# Patient Record
Sex: FEMALE | Race: WHITE | Employment: OTHER | URBAN - NONMETROPOLITAN AREA
[De-identification: names, ages, dates, MRNs, and addresses within clinical notes are randomized per-mention and may not be internally consistent; named-entity substitution may affect disease eponyms.]

---

## 2017-01-26 ENCOUNTER — OFFICE VISIT (OUTPATIENT)
Dept: INTERNAL MEDICINE CLINIC | Age: 82
End: 2017-01-26

## 2017-01-26 VITALS
SYSTOLIC BLOOD PRESSURE: 130 MMHG | DIASTOLIC BLOOD PRESSURE: 62 MMHG | BODY MASS INDEX: 35.4 KG/M2 | WEIGHT: 226 LBS | RESPIRATION RATE: 20 BRPM | HEART RATE: 72 BPM | OXYGEN SATURATION: 98 % | TEMPERATURE: 97.3 F

## 2017-01-26 DIAGNOSIS — M25.562 CHRONIC PAIN OF LEFT KNEE: ICD-10-CM

## 2017-01-26 DIAGNOSIS — E66.09 NON MORBID OBESITY DUE TO EXCESS CALORIES: ICD-10-CM

## 2017-01-26 DIAGNOSIS — E78.2 MIXED HYPERLIPIDEMIA: ICD-10-CM

## 2017-01-26 DIAGNOSIS — H40.9 GLAUCOMA OF BOTH EYES, UNSPECIFIED GLAUCOMA: ICD-10-CM

## 2017-01-26 DIAGNOSIS — G89.29 CHRONIC PAIN OF LEFT KNEE: ICD-10-CM

## 2017-01-26 DIAGNOSIS — I10 ESSENTIAL HYPERTENSION: ICD-10-CM

## 2017-01-26 PROCEDURE — 99213 OFFICE O/P EST LOW 20 MIN: CPT | Performed by: INTERNAL MEDICINE

## 2017-01-26 ASSESSMENT — ENCOUNTER SYMPTOMS
RESPIRATORY NEGATIVE: 1
EYES NEGATIVE: 1
GASTROINTESTINAL NEGATIVE: 1

## 2017-02-22 ENCOUNTER — HOSPITAL ENCOUNTER (OUTPATIENT)
Dept: LAB | Age: 82
Discharge: HOME | End: 2017-02-22
Attending: INTERNAL MEDICINE | Admitting: INTERNAL MEDICINE

## 2017-02-22 LAB — PLATELET # BLD: 147 K/CU MM (ref 140–440)

## 2017-03-07 ENCOUNTER — CARE COORDINATION (OUTPATIENT)
Dept: CARE COORDINATION | Age: 82
End: 2017-03-07

## 2017-04-13 ENCOUNTER — HOSPITAL ENCOUNTER (OUTPATIENT)
Dept: PHYSICAL THERAPY | Age: 82
Discharge: HOME | End: 2017-04-30
Attending: ORTHOPAEDIC SURGERY | Admitting: ORTHOPAEDIC SURGERY

## 2017-04-22 ENCOUNTER — HOSPITAL ENCOUNTER (OUTPATIENT)
Dept: LAB | Age: 82
Discharge: HOME | End: 2017-04-22
Attending: INTERNAL MEDICINE | Admitting: INTERNAL MEDICINE

## 2017-04-22 LAB
ALBUMIN SERPL-MCNC: 3.7 GM/DL (ref 3.4–5)
ALP BLD-CCNC: 104 IU/L (ref 40–129)
ALT SERPL-CCNC: 10 U/L (ref 10–40)
ANION GAP SERPL CALCULATED.3IONS-SCNC: 12 MMOL/L (ref 4–16)
AST SERPL-CCNC: 16 IU/L (ref 15–37)
BILIRUB SERPL-MCNC: 0.4 MG/DL (ref 0–1)
BUN BLDV-MCNC: 17 MG/DL (ref 6–23)
CALCIUM SERPL-MCNC: 9.5 MG/DL (ref 8.3–10.6)
CHLORIDE BLD-SCNC: 102 MMOL/L (ref 99–110)
CO2: 28 MMOL/L (ref 21–32)
CREAT SERPL-MCNC: 1.1 MG/DL (ref 0.6–1.1)
GFR AFRICAN AMERICAN: 58 ML/MIN/1.73M2
GFR NON-AFRICAN AMERICAN: 48 ML/MIN/1.73M2
GLUCOSE BLD-MCNC: 112 MG/DL (ref 70–140)
POTASSIUM SERPL-SCNC: 3.9 MMOL/L (ref 3.5–5.1)
SODIUM BLD-SCNC: 142 MMOL/L (ref 135–145)
TOTAL PROTEIN: 7 GM/DL (ref 6.4–8.2)

## 2017-04-23 LAB
CHOLESTEROL: 198 MG/DL
HDLC SERPL-MCNC: 98 MG/DL
LDL CHOLESTEROL DIRECT: 99 MG/DL
TRIGL SERPL-MCNC: 93 MG/DL

## 2017-04-27 ENCOUNTER — OFFICE VISIT (OUTPATIENT)
Dept: INTERNAL MEDICINE CLINIC | Age: 82
End: 2017-04-27

## 2017-04-27 VITALS
OXYGEN SATURATION: 96 % | SYSTOLIC BLOOD PRESSURE: 118 MMHG | WEIGHT: 213 LBS | TEMPERATURE: 98.1 F | DIASTOLIC BLOOD PRESSURE: 68 MMHG | BODY MASS INDEX: 35.49 KG/M2 | HEART RATE: 80 BPM | HEIGHT: 65 IN | RESPIRATION RATE: 12 BRPM

## 2017-04-27 DIAGNOSIS — R63.4 WEIGHT LOSS: ICD-10-CM

## 2017-04-27 DIAGNOSIS — Z96.652 STATUS POST TOTAL LEFT KNEE REPLACEMENT: ICD-10-CM

## 2017-04-27 DIAGNOSIS — E66.09 NON MORBID OBESITY DUE TO EXCESS CALORIES: ICD-10-CM

## 2017-04-27 DIAGNOSIS — E78.2 MIXED HYPERLIPIDEMIA: ICD-10-CM

## 2017-04-27 DIAGNOSIS — I10 ESSENTIAL HYPERTENSION: Primary | ICD-10-CM

## 2017-04-27 DIAGNOSIS — H40.9 GLAUCOMA OF BOTH EYES, UNSPECIFIED GLAUCOMA: ICD-10-CM

## 2017-04-27 PROCEDURE — 99213 OFFICE O/P EST LOW 20 MIN: CPT | Performed by: INTERNAL MEDICINE

## 2017-04-27 PROCEDURE — G8510 SCR DEP NEG, NO PLAN REQD: HCPCS | Performed by: INTERNAL MEDICINE

## 2017-04-27 RX ORDER — AMLODIPINE BESYLATE 10 MG/1
10 TABLET ORAL DAILY
Qty: 90 TABLET | Refills: 1 | Status: SHIPPED | OUTPATIENT
Start: 2017-04-27 | End: 2017-11-21 | Stop reason: SDUPTHER

## 2017-04-27 RX ORDER — ATORVASTATIN CALCIUM 10 MG/1
10 TABLET, FILM COATED ORAL DAILY
Qty: 90 TABLET | Refills: 1 | Status: SHIPPED | OUTPATIENT
Start: 2017-04-27 | End: 2017-11-21 | Stop reason: SDUPTHER

## 2017-04-27 RX ORDER — ENALAPRIL MALEATE 20 MG/1
20 TABLET ORAL 2 TIMES DAILY
Qty: 180 TABLET | Refills: 1 | Status: SHIPPED | OUTPATIENT
Start: 2017-04-27 | End: 2017-11-21 | Stop reason: SDUPTHER

## 2017-04-27 ASSESSMENT — ENCOUNTER SYMPTOMS
HEARTBURN: 0
VOMITING: 0
GASTROINTESTINAL NEGATIVE: 1
EYES NEGATIVE: 1
NAUSEA: 0
RESPIRATORY NEGATIVE: 1

## 2017-04-27 ASSESSMENT — PATIENT HEALTH QUESTIONNAIRE - PHQ9
SUM OF ALL RESPONSES TO PHQ QUESTIONS 1-9: 0
1. LITTLE INTEREST OR PLEASURE IN DOING THINGS: 0
SUM OF ALL RESPONSES TO PHQ9 QUESTIONS 1 & 2: 0
2. FEELING DOWN, DEPRESSED OR HOPELESS: 0

## 2017-05-01 ENCOUNTER — HOSPITAL ENCOUNTER (OUTPATIENT)
Dept: PHYSICAL THERAPY | Age: 82
Discharge: HOME | End: 2017-05-31
Attending: ORTHOPAEDIC SURGERY | Admitting: ORTHOPAEDIC SURGERY

## 2017-06-01 ENCOUNTER — HOSPITAL ENCOUNTER (OUTPATIENT)
Dept: PHYSICAL THERAPY | Age: 82
Discharge: HOME | End: 2017-06-12
Attending: ORTHOPAEDIC SURGERY | Admitting: ORTHOPAEDIC SURGERY

## 2017-08-16 ENCOUNTER — HOSPITAL ENCOUNTER (OUTPATIENT)
Dept: GENERAL RADIOLOGY | Age: 82
Discharge: HOME | End: 2017-08-16
Attending: INTERNAL MEDICINE | Admitting: INTERNAL MEDICINE

## 2017-08-16 ENCOUNTER — OFFICE VISIT (OUTPATIENT)
Dept: INTERNAL MEDICINE CLINIC | Age: 82
End: 2017-08-16

## 2017-08-16 ENCOUNTER — HOSPITAL ENCOUNTER (OUTPATIENT)
Dept: ULTRASOUND IMAGING | Age: 82
Discharge: HOME | End: 2017-08-16
Attending: INTERNAL MEDICINE

## 2017-08-16 VITALS
DIASTOLIC BLOOD PRESSURE: 72 MMHG | OXYGEN SATURATION: 97 % | TEMPERATURE: 98.2 F | HEART RATE: 76 BPM | SYSTOLIC BLOOD PRESSURE: 136 MMHG | RESPIRATION RATE: 13 BRPM

## 2017-08-16 DIAGNOSIS — H40.9 GLAUCOMA OF BOTH EYES, UNSPECIFIED GLAUCOMA: ICD-10-CM

## 2017-08-16 DIAGNOSIS — E66.09 NON MORBID OBESITY DUE TO EXCESS CALORIES: ICD-10-CM

## 2017-08-16 DIAGNOSIS — Z96.652 STATUS POST TOTAL LEFT KNEE REPLACEMENT: ICD-10-CM

## 2017-08-16 DIAGNOSIS — R10.32 LT GROIN PAIN: ICD-10-CM

## 2017-08-16 DIAGNOSIS — M79.652 LEFT THIGH PAIN: ICD-10-CM

## 2017-08-16 DIAGNOSIS — I10 ESSENTIAL HYPERTENSION: ICD-10-CM

## 2017-08-16 DIAGNOSIS — E78.2 MIXED HYPERLIPIDEMIA: ICD-10-CM

## 2017-08-16 DIAGNOSIS — M79.652 LEFT THIGH PAIN: Primary | ICD-10-CM

## 2017-08-16 DIAGNOSIS — R10.32 LEFT GROIN PAIN: ICD-10-CM

## 2017-08-16 PROCEDURE — 99214 OFFICE O/P EST MOD 30 MIN: CPT | Performed by: INTERNAL MEDICINE

## 2017-08-16 RX ORDER — TRAMADOL HYDROCHLORIDE 50 MG/1
50 TABLET ORAL EVERY 6 HOURS PRN
Qty: 20 TABLET | Refills: 0 | Status: SHIPPED | OUTPATIENT
Start: 2017-08-16 | End: 2017-09-26 | Stop reason: ALTCHOICE

## 2017-09-26 ENCOUNTER — OFFICE VISIT (OUTPATIENT)
Dept: INTERNAL MEDICINE CLINIC | Age: 82
End: 2017-09-26

## 2017-09-26 ENCOUNTER — TELEPHONE (OUTPATIENT)
Dept: INTERNAL MEDICINE CLINIC | Age: 82
End: 2017-09-26

## 2017-09-26 VITALS
TEMPERATURE: 97.6 F | RESPIRATION RATE: 16 BRPM | OXYGEN SATURATION: 98 % | HEART RATE: 76 BPM | WEIGHT: 200.8 LBS | SYSTOLIC BLOOD PRESSURE: 126 MMHG | BODY MASS INDEX: 33.41 KG/M2 | DIASTOLIC BLOOD PRESSURE: 76 MMHG

## 2017-09-26 DIAGNOSIS — H40.9 GLAUCOMA OF BOTH EYES, UNSPECIFIED GLAUCOMA: ICD-10-CM

## 2017-09-26 DIAGNOSIS — E78.2 MIXED HYPERLIPIDEMIA: ICD-10-CM

## 2017-09-26 DIAGNOSIS — Z96.652 STATUS POST TOTAL LEFT KNEE REPLACEMENT: ICD-10-CM

## 2017-09-26 DIAGNOSIS — Z23 NEED FOR IMMUNIZATION AGAINST INFLUENZA: ICD-10-CM

## 2017-09-26 DIAGNOSIS — Z12.31 VISIT FOR SCREENING MAMMOGRAM: Primary | ICD-10-CM

## 2017-09-26 DIAGNOSIS — I10 ESSENTIAL HYPERTENSION: ICD-10-CM

## 2017-09-26 PROCEDURE — G0008 ADMIN INFLUENZA VIRUS VAC: HCPCS | Performed by: INTERNAL MEDICINE

## 2017-09-26 PROCEDURE — 99213 OFFICE O/P EST LOW 20 MIN: CPT | Performed by: INTERNAL MEDICINE

## 2017-09-26 PROCEDURE — 90662 IIV NO PRSV INCREASED AG IM: CPT | Performed by: INTERNAL MEDICINE

## 2017-09-26 ASSESSMENT — ENCOUNTER SYMPTOMS
RESPIRATORY NEGATIVE: 1
GASTROINTESTINAL NEGATIVE: 1
EYES NEGATIVE: 1
COUGH: 0
SHORTNESS OF BREATH: 0

## 2017-10-04 ENCOUNTER — HOSPITAL ENCOUNTER (OUTPATIENT)
Dept: MAMMOGRAPHY | Age: 82
Discharge: HOME | End: 2017-10-04
Attending: INTERNAL MEDICINE | Admitting: INTERNAL MEDICINE

## 2017-10-04 DIAGNOSIS — Z12.31 VISIT FOR SCREENING MAMMOGRAM: ICD-10-CM

## 2017-11-21 RX ORDER — ENALAPRIL MALEATE 20 MG/1
20 TABLET ORAL 2 TIMES DAILY
Qty: 180 TABLET | Refills: 1 | Status: SHIPPED | OUTPATIENT
Start: 2017-11-21 | End: 2018-05-31 | Stop reason: SDUPTHER

## 2017-11-21 RX ORDER — AMLODIPINE BESYLATE 10 MG/1
10 TABLET ORAL DAILY
Qty: 90 TABLET | Refills: 1 | Status: SHIPPED | OUTPATIENT
Start: 2017-11-21 | End: 2018-05-31 | Stop reason: SDUPTHER

## 2017-11-21 RX ORDER — HYDROCHLOROTHIAZIDE 25 MG/1
25 TABLET ORAL DAILY
Qty: 90 TABLET | Refills: 1 | Status: SHIPPED | OUTPATIENT
Start: 2017-11-21 | End: 2018-11-28 | Stop reason: SDUPTHER

## 2017-11-21 RX ORDER — ATORVASTATIN CALCIUM 10 MG/1
10 TABLET, FILM COATED ORAL DAILY
Qty: 90 TABLET | Refills: 1 | Status: SHIPPED | OUTPATIENT
Start: 2017-11-21 | End: 2018-05-31 | Stop reason: SDUPTHER

## 2017-12-21 ENCOUNTER — HOSPITAL ENCOUNTER (OUTPATIENT)
Dept: LAB | Age: 82
Discharge: HOME | End: 2017-12-21
Attending: INTERNAL MEDICINE | Admitting: INTERNAL MEDICINE

## 2017-12-21 LAB
ALBUMIN SERPL-MCNC: 3.7 GM/DL (ref 3.4–5)
ALP BLD-CCNC: 87 IU/L (ref 40–129)
ALT SERPL-CCNC: 11 U/L (ref 10–40)
ANION GAP SERPL CALCULATED.3IONS-SCNC: 12 MMOL/L (ref 4–16)
AST SERPL-CCNC: 20 IU/L (ref 15–37)
BILIRUB SERPL-MCNC: 0.5 MG/DL (ref 0–1)
BUN BLDV-MCNC: 22 MG/DL (ref 6–23)
CALCIUM SERPL-MCNC: 9.7 MG/DL (ref 8.3–10.6)
CHLORIDE BLD-SCNC: 105 MMOL/L (ref 99–110)
CHOLESTEROL, FASTING: 204 MG/DL
CO2: 28 MMOL/L (ref 21–32)
CREAT SERPL-MCNC: 1.2 MG/DL (ref 0.6–1.1)
GFR AFRICAN AMERICAN: 52 ML/MIN/1.73M2
GFR NON-AFRICAN AMERICAN: 43 ML/MIN/1.73M2
GLUCOSE FASTING: 105 MG/DL (ref 70–99)
HDLC SERPL-MCNC: 101 MG/DL
LDL CHOLESTEROL DIRECT: 96 MG/DL
POTASSIUM SERPL-SCNC: 3.9 MMOL/L (ref 3.5–5.1)
SODIUM BLD-SCNC: 145 MMOL/L (ref 135–145)
TOTAL PROTEIN: 6.7 GM/DL (ref 6.4–8.2)
TRIGLYCERIDE, FASTING: 68 MG/DL

## 2018-01-09 ENCOUNTER — OFFICE VISIT (OUTPATIENT)
Dept: INTERNAL MEDICINE CLINIC | Age: 83
End: 2018-01-09

## 2018-01-09 VITALS
HEIGHT: 66 IN | SYSTOLIC BLOOD PRESSURE: 130 MMHG | OXYGEN SATURATION: 97 % | RESPIRATION RATE: 16 BRPM | DIASTOLIC BLOOD PRESSURE: 70 MMHG | WEIGHT: 201.6 LBS | BODY MASS INDEX: 32.4 KG/M2 | TEMPERATURE: 97.2 F | HEART RATE: 72 BPM

## 2018-01-09 DIAGNOSIS — H40.9 GLAUCOMA OF BOTH EYES, UNSPECIFIED GLAUCOMA TYPE: ICD-10-CM

## 2018-01-09 DIAGNOSIS — E78.2 MIXED HYPERLIPIDEMIA: ICD-10-CM

## 2018-01-09 DIAGNOSIS — Z96.652 STATUS POST TOTAL LEFT KNEE REPLACEMENT: ICD-10-CM

## 2018-01-09 DIAGNOSIS — I10 ESSENTIAL HYPERTENSION: ICD-10-CM

## 2018-01-09 PROCEDURE — 99213 OFFICE O/P EST LOW 20 MIN: CPT | Performed by: INTERNAL MEDICINE

## 2018-01-09 ASSESSMENT — ENCOUNTER SYMPTOMS
EYES NEGATIVE: 1
GASTROINTESTINAL NEGATIVE: 1
RESPIRATORY NEGATIVE: 1

## 2018-04-02 ENCOUNTER — HOSPITAL ENCOUNTER (OUTPATIENT)
Dept: LAB | Age: 83
Discharge: HOME | End: 2018-04-02
Attending: INTERNAL MEDICINE | Admitting: INTERNAL MEDICINE

## 2018-04-02 LAB
ANION GAP SERPL CALCULATED.3IONS-SCNC: 5 MMOL/L (ref 4–16)
BUN BLDV-MCNC: 20 MG/DL (ref 6–23)
CALCIUM SERPL-MCNC: 9.3 MG/DL (ref 8.3–10.6)
CHLORIDE BLD-SCNC: 108 MMOL/L (ref 99–110)
CO2: 33 MMOL/L (ref 21–32)
CREAT SERPL-MCNC: 1 MG/DL (ref 0.6–1.1)
GFR AFRICAN AMERICAN: >60 ML/MIN/1.73M2
GFR NON-AFRICAN AMERICAN: 53 ML/MIN/1.73M2
GLUCOSE FASTING: 101 MG/DL (ref 70–99)
POTASSIUM SERPL-SCNC: 4.5 MMOL/L (ref 3.5–5.1)
SODIUM BLD-SCNC: 146 MMOL/L (ref 135–145)

## 2018-04-10 ENCOUNTER — OFFICE VISIT (OUTPATIENT)
Dept: INTERNAL MEDICINE CLINIC | Age: 83
End: 2018-04-10

## 2018-04-10 VITALS
WEIGHT: 202.2 LBS | HEART RATE: 78 BPM | BODY MASS INDEX: 32.64 KG/M2 | RESPIRATION RATE: 16 BRPM | SYSTOLIC BLOOD PRESSURE: 132 MMHG | DIASTOLIC BLOOD PRESSURE: 80 MMHG | OXYGEN SATURATION: 99 % | TEMPERATURE: 97.5 F

## 2018-04-10 DIAGNOSIS — I10 ESSENTIAL HYPERTENSION: Primary | ICD-10-CM

## 2018-04-10 DIAGNOSIS — R53.83 FATIGUE, UNSPECIFIED TYPE: ICD-10-CM

## 2018-04-10 DIAGNOSIS — H40.9 GLAUCOMA OF BOTH EYES, UNSPECIFIED GLAUCOMA TYPE: ICD-10-CM

## 2018-04-10 DIAGNOSIS — E78.2 MIXED HYPERLIPIDEMIA: ICD-10-CM

## 2018-04-10 DIAGNOSIS — Z96.652 STATUS POST TOTAL LEFT KNEE REPLACEMENT: ICD-10-CM

## 2018-04-10 PROCEDURE — 1123F ACP DISCUSS/DSCN MKR DOCD: CPT | Performed by: INTERNAL MEDICINE

## 2018-04-10 PROCEDURE — 4040F PNEUMOC VAC/ADMIN/RCVD: CPT | Performed by: INTERNAL MEDICINE

## 2018-04-10 PROCEDURE — G8427 DOCREV CUR MEDS BY ELIG CLIN: HCPCS | Performed by: INTERNAL MEDICINE

## 2018-04-10 PROCEDURE — 99213 OFFICE O/P EST LOW 20 MIN: CPT | Performed by: INTERNAL MEDICINE

## 2018-04-10 PROCEDURE — 1036F TOBACCO NON-USER: CPT | Performed by: INTERNAL MEDICINE

## 2018-04-10 PROCEDURE — 1090F PRES/ABSN URINE INCON ASSESS: CPT | Performed by: INTERNAL MEDICINE

## 2018-04-10 PROCEDURE — G8417 CALC BMI ABV UP PARAM F/U: HCPCS | Performed by: INTERNAL MEDICINE

## 2018-04-10 PROCEDURE — G8399 PT W/DXA RESULTS DOCUMENT: HCPCS | Performed by: INTERNAL MEDICINE

## 2018-04-10 ASSESSMENT — ENCOUNTER SYMPTOMS
SHORTNESS OF BREATH: 0
BACK PAIN: 1
EYES NEGATIVE: 1
RESPIRATORY NEGATIVE: 1
COUGH: 0
GASTROINTESTINAL NEGATIVE: 1

## 2018-05-31 RX ORDER — ATORVASTATIN CALCIUM 10 MG/1
10 TABLET, FILM COATED ORAL DAILY
Qty: 90 TABLET | Refills: 1 | Status: SHIPPED | OUTPATIENT
Start: 2018-05-31 | End: 2018-11-28 | Stop reason: SDUPTHER

## 2018-05-31 RX ORDER — ENALAPRIL MALEATE 20 MG/1
20 TABLET ORAL 2 TIMES DAILY
Qty: 180 TABLET | Refills: 1 | Status: SHIPPED | OUTPATIENT
Start: 2018-05-31 | End: 2018-11-28 | Stop reason: SDUPTHER

## 2018-05-31 RX ORDER — AMLODIPINE BESYLATE 10 MG/1
10 TABLET ORAL DAILY
Qty: 90 TABLET | Refills: 1 | Status: SHIPPED | OUTPATIENT
Start: 2018-05-31 | End: 2018-11-28 | Stop reason: SDUPTHER

## 2018-07-02 ENCOUNTER — HOSPITAL ENCOUNTER (OUTPATIENT)
Dept: LAB | Age: 83
Discharge: HOME | End: 2018-07-02
Attending: INTERNAL MEDICINE | Admitting: INTERNAL MEDICINE

## 2018-07-02 LAB
ALBUMIN SERPL-MCNC: 3.7 GM/DL (ref 3.4–5)
ALP BLD-CCNC: 82 IU/L (ref 40–129)
ALT SERPL-CCNC: 11 U/L (ref 10–40)
ANION GAP SERPL CALCULATED.3IONS-SCNC: 15 MMOL/L (ref 4–16)
AST SERPL-CCNC: 19 IU/L (ref 15–37)
BASOPHILS ABSOLUTE: 0 K/CU MM
BASOPHILS RELATIVE PERCENT: 0.5 % (ref 0–1)
BILIRUB SERPL-MCNC: 0.5 MG/DL (ref 0–1)
BUN BLDV-MCNC: 27 MG/DL (ref 6–23)
CALCIUM SERPL-MCNC: 9.5 MG/DL (ref 8.3–10.6)
CHLORIDE BLD-SCNC: 104 MMOL/L (ref 99–110)
CHOLESTEROL, FASTING: 179 MG/DL
CO2: 25 MMOL/L (ref 21–32)
CREAT SERPL-MCNC: 1.3 MG/DL (ref 0.6–1.1)
DIFFERENTIAL TYPE: ABNORMAL
EOSINOPHILS ABSOLUTE: 0.2 K/CU MM
EOSINOPHILS RELATIVE PERCENT: 3 % (ref 0–3)
GFR AFRICAN AMERICAN: 48 ML/MIN/1.73M2
GFR NON-AFRICAN AMERICAN: 39 ML/MIN/1.73M2
GLUCOSE FASTING: 94 MG/DL (ref 70–99)
HCT VFR BLD CALC: 37.8 % (ref 37–47)
HDLC SERPL-MCNC: 90 MG/DL
HEMOGLOBIN: 11.9 GM/DL (ref 12.5–16)
IMMATURE NEUTROPHIL %: 0.3 % (ref 0–0.43)
LDL CHOLESTEROL DIRECT: 92 MG/DL
LYMPHOCYTES ABSOLUTE: 2.7 K/CU MM
LYMPHOCYTES RELATIVE PERCENT: 36 % (ref 24–44)
MCH RBC QN AUTO: 32.3 PG (ref 27–31)
MCHC RBC AUTO-ENTMCNC: 31.5 % (ref 32–36)
MCV RBC AUTO: 102.7 FL (ref 78–100)
MONOCYTES ABSOLUTE: 0.6 K/CU MM
MONOCYTES RELATIVE PERCENT: 8.3 % (ref 0–4)
PDW BLD-RTO: 13.2 % (ref 11.7–14.9)
PLATELET # BLD: 62 K/CU MM (ref 140–440)
PMV BLD AUTO: 12.5 FL (ref 7.5–11.1)
POTASSIUM SERPL-SCNC: 4.6 MMOL/L (ref 3.5–5.1)
RBC # BLD: 3.68 M/CU MM (ref 4.2–5.4)
SEGMENTED NEUTROPHILS ABSOLUTE COUNT: 4 K/CU MM
SEGMENTED NEUTROPHILS RELATIVE PERCENT: 51.9 % (ref 36–66)
SODIUM BLD-SCNC: 144 MMOL/L (ref 135–145)
TOTAL IMMATURE NEUTOROPHIL: 0.02 K/CU MM
TOTAL PROTEIN: 6.5 GM/DL (ref 6.4–8.2)
TRIGLYCERIDE, FASTING: 66 MG/DL
TSH HIGH SENSITIVITY: 2.05 UIU/ML (ref 0.27–4.2)
WBC # BLD: 7.6 K/CU MM (ref 4–10.5)

## 2018-07-11 ENCOUNTER — OFFICE VISIT (OUTPATIENT)
Dept: INTERNAL MEDICINE CLINIC | Age: 83
End: 2018-07-11

## 2018-07-11 VITALS
RESPIRATION RATE: 18 BRPM | DIASTOLIC BLOOD PRESSURE: 72 MMHG | WEIGHT: 194.2 LBS | SYSTOLIC BLOOD PRESSURE: 122 MMHG | BODY MASS INDEX: 31.21 KG/M2 | TEMPERATURE: 97.4 F | HEIGHT: 66 IN | HEART RATE: 75 BPM | OXYGEN SATURATION: 98 %

## 2018-07-11 DIAGNOSIS — R53.83 OTHER FATIGUE: ICD-10-CM

## 2018-07-11 DIAGNOSIS — I10 ESSENTIAL HYPERTENSION: Primary | ICD-10-CM

## 2018-07-11 DIAGNOSIS — E78.2 MIXED HYPERLIPIDEMIA: ICD-10-CM

## 2018-07-11 DIAGNOSIS — H40.9 GLAUCOMA OF BOTH EYES, UNSPECIFIED GLAUCOMA TYPE: ICD-10-CM

## 2018-07-11 DIAGNOSIS — F32.A DEPRESSION, UNSPECIFIED DEPRESSION TYPE: ICD-10-CM

## 2018-07-11 DIAGNOSIS — Z96.652 STATUS POST TOTAL LEFT KNEE REPLACEMENT: ICD-10-CM

## 2018-07-11 PROCEDURE — 4040F PNEUMOC VAC/ADMIN/RCVD: CPT | Performed by: INTERNAL MEDICINE

## 2018-07-11 PROCEDURE — 1036F TOBACCO NON-USER: CPT | Performed by: INTERNAL MEDICINE

## 2018-07-11 PROCEDURE — G8427 DOCREV CUR MEDS BY ELIG CLIN: HCPCS | Performed by: INTERNAL MEDICINE

## 2018-07-11 PROCEDURE — 1101F PT FALLS ASSESS-DOCD LE1/YR: CPT | Performed by: INTERNAL MEDICINE

## 2018-07-11 PROCEDURE — G8399 PT W/DXA RESULTS DOCUMENT: HCPCS | Performed by: INTERNAL MEDICINE

## 2018-07-11 PROCEDURE — 1090F PRES/ABSN URINE INCON ASSESS: CPT | Performed by: INTERNAL MEDICINE

## 2018-07-11 PROCEDURE — 99214 OFFICE O/P EST MOD 30 MIN: CPT | Performed by: INTERNAL MEDICINE

## 2018-07-11 PROCEDURE — G8417 CALC BMI ABV UP PARAM F/U: HCPCS | Performed by: INTERNAL MEDICINE

## 2018-07-11 PROCEDURE — 1123F ACP DISCUSS/DSCN MKR DOCD: CPT | Performed by: INTERNAL MEDICINE

## 2018-07-11 RX ORDER — ESCITALOPRAM OXALATE 10 MG/1
10 TABLET ORAL DAILY
Qty: 30 TABLET | Refills: 3 | Status: SHIPPED | OUTPATIENT
Start: 2018-07-11 | End: 2018-11-28 | Stop reason: SDUPTHER

## 2018-07-11 ASSESSMENT — ENCOUNTER SYMPTOMS
BACK PAIN: 0
SHORTNESS OF BREATH: 0
CONSTIPATION: 1
BLOOD IN STOOL: 0
EYES NEGATIVE: 1
NAUSEA: 0
RESPIRATORY NEGATIVE: 1
VOMITING: 0

## 2018-07-11 ASSESSMENT — PATIENT HEALTH QUESTIONNAIRE - PHQ9
SUM OF ALL RESPONSES TO PHQ QUESTIONS 1-9: 1
1. LITTLE INTEREST OR PLEASURE IN DOING THINGS: 1
2. FEELING DOWN, DEPRESSED OR HOPELESS: 0
SUM OF ALL RESPONSES TO PHQ9 QUESTIONS 1 & 2: 1

## 2018-07-11 NOTE — PROGRESS NOTES
Kymberly Gayle  Patient's  is 1935  Seen in office on 2018      SUBJECTIVE:  Vicente Martinez is a [de-identified] y. o.year old female presents today   Chief Complaint   Patient presents with    3 Month Follow-Up     HTN    Knee Problem     Pt had a knee replacement about 1.5 years ago and states she still has problems with it. Pt has an appt with that doctor tomorrow.  Other     Pt states she is having the same experiences as her last visit of having very little energy and gets frustrated when she can't do what she used to be able to do     Pt is here for f/u of HTN, left knee pain, lack of energy. Patient states she is not able to do the things she was able to do in the past  No chest pain. No SOB>  Edema some times  Lack of energy. No falls. Appetite is fair. Losing wt again. Lost 8 lbs. No melena or hematochezia  Pt has constipation : moves BM every 3-4 days. Takes laxatives after that. Taking medications regularly. No side effects noted. Lab results : reviewed TSH is normal.  Lipid profile normal.  CMP : cr 1.3  H/H 11.9/37.8 : .7    Pt's son in law  in  with heart attack. It is bothering her. It affected her also. Has some depression. No suicidal ideation. Review of Systems   Constitutional: Negative. Negative for chills, fever and weight loss. HENT: Negative. Negative for hearing loss. Eyes: Negative. Respiratory: Negative. Negative for shortness of breath. Cardiovascular: Negative. Negative for chest pain, palpitations and leg swelling. Gastrointestinal: Positive for constipation. Negative for blood in stool, nausea and vomiting. Genitourinary: Negative. Negative for dysuria and urgency. Musculoskeletal: Negative for back pain and falls. Skin: Negative. Neurological: Negative for dizziness and headaches. Endo/Heme/Allergies: Negative for environmental allergies. Does not bruise/bleed easily.    Psychiatric/Behavioral: Negative for depression and

## 2018-07-11 NOTE — ASSESSMENT & PLAN NOTE
Hypertension in control. Follow low salt diet. Continue current treatment.   Pt is on enalapril 20 mg bid, amlodipine 10 mg daily and HCTZ

## 2018-07-11 NOTE — ASSESSMENT & PLAN NOTE
Hyperlipidemia is stable. Follow low cholesterol diet. Continue current treatment. Continue atorvastatin 10 mg daily  Lipid profile is good.

## 2018-07-24 ENCOUNTER — HOSPITAL ENCOUNTER (OUTPATIENT)
Dept: MRI IMAGING | Age: 83
Discharge: HOME | End: 2018-07-24
Attending: ORTHOPAEDIC SURGERY | Admitting: ORTHOPAEDIC SURGERY

## 2018-07-24 DIAGNOSIS — M25.551 RIGHT HIP PAIN: ICD-10-CM

## 2018-09-13 ENCOUNTER — OFFICE VISIT (OUTPATIENT)
Dept: INTERNAL MEDICINE CLINIC | Age: 83
End: 2018-09-13

## 2018-09-13 ENCOUNTER — HOSPITAL ENCOUNTER (OUTPATIENT)
Dept: LAB | Age: 83
Discharge: HOME | End: 2018-09-13
Attending: INTERNAL MEDICINE | Admitting: INTERNAL MEDICINE

## 2018-09-13 VITALS
SYSTOLIC BLOOD PRESSURE: 128 MMHG | OXYGEN SATURATION: 93 % | DIASTOLIC BLOOD PRESSURE: 80 MMHG | BODY MASS INDEX: 30.18 KG/M2 | HEIGHT: 66 IN | WEIGHT: 187.8 LBS | RESPIRATION RATE: 16 BRPM | HEART RATE: 74 BPM

## 2018-09-13 DIAGNOSIS — I10 ESSENTIAL HYPERTENSION: ICD-10-CM

## 2018-09-13 DIAGNOSIS — R53.83 OTHER FATIGUE: ICD-10-CM

## 2018-09-13 DIAGNOSIS — D69.6 THROMBOCYTOPENIA (HCC): Primary | ICD-10-CM

## 2018-09-13 DIAGNOSIS — H40.9 GLAUCOMA OF BOTH EYES, UNSPECIFIED GLAUCOMA TYPE: ICD-10-CM

## 2018-09-13 DIAGNOSIS — E78.2 MIXED HYPERLIPIDEMIA: ICD-10-CM

## 2018-09-13 DIAGNOSIS — F32.9 MAJOR DEPRESSIVE DISORDER WITH SINGLE EPISODE, REMISSION STATUS UNSPECIFIED: ICD-10-CM

## 2018-09-13 LAB
ALBUMIN SERPL-MCNC: 3.9 GM/DL (ref 3.4–5)
ALP BLD-CCNC: 76 IU/L (ref 40–129)
ALT SERPL-CCNC: 11 U/L (ref 10–40)
ANION GAP SERPL CALCULATED.3IONS-SCNC: 14 MMOL/L (ref 4–16)
AST SERPL-CCNC: 21 IU/L (ref 15–37)
BILIRUB SERPL-MCNC: 0.4 MG/DL (ref 0–1)
BUN BLDV-MCNC: 21 MG/DL (ref 6–23)
CALCIUM SERPL-MCNC: 9.6 MG/DL (ref 8.3–10.6)
CHLORIDE BLD-SCNC: 100 MMOL/L (ref 99–110)
CO2: 28 MMOL/L (ref 21–32)
CREAT SERPL-MCNC: 1.1 MG/DL (ref 0.6–1.1)
DIFFERENTIAL TYPE: ABNORMAL
GFR AFRICAN AMERICAN: 58 ML/MIN/1.73M2
GFR NON-AFRICAN AMERICAN: 48 ML/MIN/1.73M2
GLUCOSE FASTING: 110 MG/DL (ref 70–99)
HCT VFR BLD CALC: 40.3 % (ref 37–47)
HEMOGLOBIN: 12.3 GM/DL (ref 12.5–16)
LYMPHOCYTES ABSOLUTE: 2 K/CU MM
LYMPHOCYTES RELATIVE PERCENT: 25 % (ref 24–44)
MCH RBC QN AUTO: 32 PG (ref 27–31)
MCHC RBC AUTO-ENTMCNC: 30.5 % (ref 32–36)
MCV RBC AUTO: 104.9 FL (ref 78–100)
MONOCYTES ABSOLUTE: 0.6 K/CU MM
MONOCYTES RELATIVE PERCENT: 8 % (ref 0–4)
PDW BLD-RTO: 13.2 % (ref 11.7–14.9)
PLATELET # BLD: 98 K/CU MM (ref 140–440)
PMV BLD AUTO: 12.4 FL (ref 7.5–11.1)
POTASSIUM SERPL-SCNC: 4.6 MMOL/L (ref 3.5–5.1)
RBC # BLD: 3.84 M/CU MM (ref 4.2–5.4)
SEGMENTED NEUTROPHILS ABSOLUTE COUNT: 5.2 K/CU MM
SEGMENTED NEUTROPHILS RELATIVE PERCENT: 67 % (ref 36–66)
SODIUM BLD-SCNC: 142 MMOL/L (ref 135–145)
TOTAL PROTEIN: 6.9 GM/DL (ref 6.4–8.2)
WBC # BLD: 7.8 K/CU MM (ref 4–10.5)

## 2018-09-13 PROCEDURE — G8399 PT W/DXA RESULTS DOCUMENT: HCPCS | Performed by: INTERNAL MEDICINE

## 2018-09-13 PROCEDURE — 1036F TOBACCO NON-USER: CPT | Performed by: INTERNAL MEDICINE

## 2018-09-13 PROCEDURE — G8427 DOCREV CUR MEDS BY ELIG CLIN: HCPCS | Performed by: INTERNAL MEDICINE

## 2018-09-13 PROCEDURE — G8417 CALC BMI ABV UP PARAM F/U: HCPCS | Performed by: INTERNAL MEDICINE

## 2018-09-13 PROCEDURE — 1090F PRES/ABSN URINE INCON ASSESS: CPT | Performed by: INTERNAL MEDICINE

## 2018-09-13 PROCEDURE — 1123F ACP DISCUSS/DSCN MKR DOCD: CPT | Performed by: INTERNAL MEDICINE

## 2018-09-13 PROCEDURE — 4040F PNEUMOC VAC/ADMIN/RCVD: CPT | Performed by: INTERNAL MEDICINE

## 2018-09-13 PROCEDURE — 99213 OFFICE O/P EST LOW 20 MIN: CPT | Performed by: INTERNAL MEDICINE

## 2018-09-13 PROCEDURE — 1101F PT FALLS ASSESS-DOCD LE1/YR: CPT | Performed by: INTERNAL MEDICINE

## 2018-09-13 ASSESSMENT — PATIENT HEALTH QUESTIONNAIRE - PHQ9
2. FEELING DOWN, DEPRESSED OR HOPELESS: 0
SUM OF ALL RESPONSES TO PHQ9 QUESTIONS 1 & 2: 1
1. LITTLE INTEREST OR PLEASURE IN DOING THINGS: 1
SUM OF ALL RESPONSES TO PHQ QUESTIONS 1-9: 1
SUM OF ALL RESPONSES TO PHQ QUESTIONS 1-9: 1

## 2018-09-13 ASSESSMENT — ENCOUNTER SYMPTOMS
EYES NEGATIVE: 1
BACK PAIN: 1
GASTROINTESTINAL NEGATIVE: 1
RESPIRATORY NEGATIVE: 1

## 2018-09-13 NOTE — PROGRESS NOTES
Kathleen Yates  Patient's  is 1935  Seen in office on 2018      SUBJECTIVE:  Gomez Runner is a 82 y. o.year old female presents today   Chief Complaint   Patient presents with    Follow-up     2 month follow up-Depression     Patient is here for follow-up of her depression. She is taking Lexapro and is feeling better to some extent. No suicidal ideations. Patient is concerned about her left hip pain. She went to see orthopedic doctor in [place] and they ordered an MRI of the right hip. MRI showed some mild arthritis and effusion in the right knee and they were able to see partially the left hip that also showed the same. She also had some bursitis. She is undergoing physical therapy  Patient denies any chest pain. No shortness of breath. No cough or sputum production. Patient has fatigue. Blood tests were done 2 months ago. Patient states she is bruising of the arms. No bleeding from the nose, stools or in the urine. Patient's CBC showed a reticulocyte count of 62,000. The last CBC was in     Taking medications regularly. No side effects noted. Review of Systems   Constitutional: Negative. Negative for chills and fever. HENT: Negative. Eyes: Negative. Respiratory: Negative. Cardiovascular: Negative. Gastrointestinal: Negative. Genitourinary: Negative. Negative for dysuria and urgency. Musculoskeletal: Positive for back pain and joint pain. Skin: Negative. Neurological: Negative. Endo/Heme/Allergies: Negative. Psychiatric/Behavioral: Positive for depression.        OBJECTIVE: /80 (Site: Right Upper Arm, Position: Sitting, Cuff Size: Medium Adult)   Pulse 74   Resp 16   Ht 5' 6\" (1.676 m)   Wt 187 lb 12.8 oz (85.2 kg)   SpO2 93%   BMI 30.31 kg/m²     Wt Readings from Last 3 Encounters:   18 187 lb 12.8 oz (85.2 kg)   18 194 lb 3.2 oz (88.1 kg)   04/10/18 202 lb 3.2 oz (91.7 kg)      GENERAL:  Alert, oriented, pleasant, in no glaucoma type  Patient has glaucoma and is on medications    Return to office in 1 week    Repeat CBC with a differential today shows a white count of 7800, hemoglobin 12.3 hematocrit 40.3. .9 . Platelet count had improved to 98,000        Mediations reviewed with the patient. Continue current medications. Appropriate prescriptions are addressed. After visit summery provided. Follow up as directed sooner if needed. Questions answered and patient verbalizes understanding. Call for any problems, questions, or concerns. No Known Allergies  Current Outpatient Prescriptions   Medication Sig Dispense Refill    escitalopram (LEXAPRO) 10 MG tablet Take 1 tablet by mouth daily 30 tablet 3    amLODIPine (NORVASC) 10 MG tablet Take 1 tablet by mouth daily 90 tablet 1    atorvastatin (LIPITOR) 10 MG tablet Take 1 tablet by mouth daily 90 tablet 1    enalapril (VASOTEC) 20 MG tablet Take 1 tablet by mouth 2 times daily 180 tablet 1    hydrochlorothiazide (HYDRODIURIL) 25 MG tablet Take 1 tablet by mouth daily 90 tablet 1    aspirin 81 MG tablet Take 81 mg by mouth daily.  latanoprost (XALATAN) 0.005 % ophthalmic solution 1 drop nightly. No current facility-administered medications for this visit. Past Medical History:   Diagnosis Date    Colonoscopy refused     Glaucoma     Dr. Emily Magallon Hyperlipidemia     Hypertension     Major depressive disorder with single episode 9/13/2018    Patient has depression and agitation. Was started on the Lexapro.     Obesity      Past Surgical History:   Procedure Laterality Date    CATARACT REMOVAL Bilateral     CHOLECYSTECTOMY      HYSTERECTOMY Bilateral 1981    KNEE ARTHROSCOPY Left 2007    TOTAL KNEE ARTHROPLASTY Left 03/10/2017     Social History   Substance Use Topics    Smoking status: Never Smoker    Smokeless tobacco: Never Used    Alcohol use No       LAB REVIEW:  CBC:   Lab Results   Component Value Date    WBC 7.8 09/13/2018    HGB 12.3 09/13/2018    HCT 40.3 09/13/2018    PLT 98 09/13/2018     Lipids:   Lab Results   Component Value Date    CHOL 198 04/22/2017    TRIG 93 04/22/2017    HDL 90 07/02/2018    LDLCALC 72 12/01/2014    LDLDIRECT 92 07/02/2018    TRIGLYCFAST 66 07/02/2018     Renal:   Lab Results   Component Value Date    BUN 21 09/13/2018    CREATININE 1.1 09/13/2018     09/13/2018    K 4.6 09/13/2018    ALT 11 09/13/2018    AST 21 09/13/2018    GLUCOSE 112 04/22/2017     PT/INR: No results found for: INR  A1C: No results found for: Elias Sow MD, 9/13/2018 , 2:50 PM

## 2018-09-14 ENCOUNTER — HOSPITAL ENCOUNTER (OUTPATIENT)
Dept: LAB | Age: 83
Discharge: HOME | End: 2018-09-14
Attending: INTERNAL MEDICINE | Admitting: INTERNAL MEDICINE

## 2018-09-14 LAB
FOLATE: 14.4 NG/ML (ref 3.1–17.5)
VITAMIN B-12: 387.5 PG/ML (ref 211–911)

## 2018-09-21 ENCOUNTER — HOSPITAL ENCOUNTER (OUTPATIENT)
Dept: GENERAL RADIOLOGY | Age: 83
Discharge: HOME | End: 2018-09-21
Attending: INTERNAL MEDICINE | Admitting: INTERNAL MEDICINE

## 2018-09-21 ENCOUNTER — OFFICE VISIT (OUTPATIENT)
Dept: INTERNAL MEDICINE CLINIC | Age: 83
End: 2018-09-21

## 2018-09-21 VITALS
WEIGHT: 182.4 LBS | RESPIRATION RATE: 14 BRPM | SYSTOLIC BLOOD PRESSURE: 123 MMHG | BODY MASS INDEX: 29.32 KG/M2 | HEART RATE: 88 BPM | HEIGHT: 66 IN | DIASTOLIC BLOOD PRESSURE: 75 MMHG | OXYGEN SATURATION: 97 %

## 2018-09-21 DIAGNOSIS — H40.9 GLAUCOMA OF BOTH EYES, UNSPECIFIED GLAUCOMA TYPE: ICD-10-CM

## 2018-09-21 DIAGNOSIS — D69.6 THROMBOCYTOPENIA (HCC): Primary | ICD-10-CM

## 2018-09-21 DIAGNOSIS — R63.4 WEIGHT LOSS: ICD-10-CM

## 2018-09-21 DIAGNOSIS — I10 ESSENTIAL HYPERTENSION: ICD-10-CM

## 2018-09-21 DIAGNOSIS — F32.9 MAJOR DEPRESSIVE DISORDER WITH SINGLE EPISODE, REMISSION STATUS UNSPECIFIED: ICD-10-CM

## 2018-09-21 DIAGNOSIS — E78.2 MIXED HYPERLIPIDEMIA: ICD-10-CM

## 2018-09-21 DIAGNOSIS — M54.42 BILATERAL LOW BACK PAIN WITH LEFT-SIDED SCIATICA, UNSPECIFIED CHRONICITY: ICD-10-CM

## 2018-09-21 DIAGNOSIS — Z96.652 STATUS POST TOTAL LEFT KNEE REPLACEMENT: ICD-10-CM

## 2018-09-21 PROCEDURE — 1036F TOBACCO NON-USER: CPT | Performed by: INTERNAL MEDICINE

## 2018-09-21 PROCEDURE — G8399 PT W/DXA RESULTS DOCUMENT: HCPCS | Performed by: INTERNAL MEDICINE

## 2018-09-21 PROCEDURE — G8417 CALC BMI ABV UP PARAM F/U: HCPCS | Performed by: INTERNAL MEDICINE

## 2018-09-21 PROCEDURE — 1101F PT FALLS ASSESS-DOCD LE1/YR: CPT | Performed by: INTERNAL MEDICINE

## 2018-09-21 PROCEDURE — 99214 OFFICE O/P EST MOD 30 MIN: CPT | Performed by: INTERNAL MEDICINE

## 2018-09-21 PROCEDURE — 1090F PRES/ABSN URINE INCON ASSESS: CPT | Performed by: INTERNAL MEDICINE

## 2018-09-21 PROCEDURE — G8427 DOCREV CUR MEDS BY ELIG CLIN: HCPCS | Performed by: INTERNAL MEDICINE

## 2018-09-21 PROCEDURE — 4040F PNEUMOC VAC/ADMIN/RCVD: CPT | Performed by: INTERNAL MEDICINE

## 2018-09-21 PROCEDURE — 1123F ACP DISCUSS/DSCN MKR DOCD: CPT | Performed by: INTERNAL MEDICINE

## 2018-09-21 NOTE — PROGRESS NOTES
Chayo Jose  Patient's  is 1935  Seen in office on 2018      SUBJECTIVE:  Mateo Mcghee is a 82 y. o.year old female presents today   Chief Complaint   Patient presents with    Follow-up     Thrombocytopenia 1 week     Patient is here with her   Patient is here for follow-up of thrombocytopenia. Her previous platelet counts were low on 2018 platelet count was 32,579 with mild anemia  On 2018 platelet count went up to 98,000. MCV is elevated to 104.9. CMP was normal.  Vitamin B12 was 387 and folate level 14,4  Taking medications regularly. No side effects noted. Pt states she has lot pain in the back and both hips pain. On left side pain radiates to  left leg around the left knee. No fever or chills. Pt has seen doctors in the [place] she has seen orthopedic surgeon who told her that pain is coming from the hips. An MRI of the right hip showed mild arthritis and mild effusion in both hips and degenerative arthritis in the lumbar spine. Patient has seen some spine surgeon also over there. He recommended physical therapy. Patient states she did have some physical therapy but it is not helping. She does have follow-up appointment with a spine surgeon. Patient has lost weight in the last year also. Her  states she is not eating well. She has been depressed but the medicine is helping her. Patient is concerned about her overall health. She is not able to ambulate much because of the aches and pains. Review of Systems   Constitutional: Positive for malaise/fatigue and weight loss. Negative for chills and fever. HENT: Positive for hearing loss. Respiratory: Negative. Negative for cough and sputum production. Cardiovascular: Negative. Negative for chest pain and palpitations. Gastrointestinal: Negative. Negative for abdominal pain, blood in stool, constipation, diarrhea, heartburn, melena, nausea and vomiting. Genitourinary: Negative.   Negative for hematuria. Musculoskeletal: Positive for back pain. Negative for joint pain. Skin: Negative. Neurological: Negative. Negative for focal weakness, seizures and loss of consciousness. Endo/Heme/Allergies: Negative. Psychiatric/Behavioral: Positive for depression. OBJECTIVE: /75 (Site: Right Upper Arm, Position: Sitting, Cuff Size: Medium Adult)   Pulse 88   Resp 14   Ht 5' 6\" (1.676 m)   Wt 182 lb 6.4 oz (82.7 kg)   SpO2 97%   BMI 29.44 kg/m²     Wt Readings from Last 3 Encounters:   09/21/18 182 lb 6.4 oz (82.7 kg)   09/13/18 187 lb 12.8 oz (85.2 kg)   07/11/18 194 lb 3.2 oz (88.1 kg)      GENERAL:  Alert, oriented, pleasant, in no apparent distress. HEENT:  Conjunctiva pink, no scleral icterus. ENT clear. NECK:  Supple. No jugular venous distention noted. No masses felt,  CARDIOVASCULAR:  Normal S1 and S2    PULMONARY:  No respiratory distress. No wheezes or rales. ABDOMEN:  Soft and non-tender,no masses  or organomegaly. EXTREMITIES:  No cyanosis, clubbing, or significant edema. SKIN: Skin is warm and dry. NEUROLOGICAL:  Cranial nerves II through XII are grossly intact. Patient is using walker to ambulate. Back there is mild tenderness on the left sacroiliac area. Tenderness in the right hip laterally. MRI of the right hip done on 7/25/2018  Impression   1. Mild right hip osteoarthritis with a small joint effusion. 2. Mild right-sided greater trochanteric bursitis. 3. No acute osseous abnormality of the right hip. 4. Note is also made of mild left hip osteoarthritis with a small left hip   joint effusion at the edge of the field of view, not studied in detail. 5. Degenerative disc disease at multiple levels in the lower lumbar spine,   also not studied in detail.  If there are neurologic symptoms, dedicated   lumbar spine MRI could be performed for further evaluation.                 IMPRESSION:    Encounter Diagnoses   Name Primary?     Procedures    XR CHEST STANDARD (2 VW)       Mediations reviewed with the patient. Continue current medications. Appropriate prescriptions are addressed. After visit summery provided. Follow up as directed sooner if needed. Questions answered and patient verbalizes understanding. Call for any problems, questions, or concerns. RTO in 1 month    No Known Allergies  Current Outpatient Prescriptions   Medication Sig Dispense Refill    escitalopram (LEXAPRO) 10 MG tablet Take 1 tablet by mouth daily 30 tablet 3    amLODIPine (NORVASC) 10 MG tablet Take 1 tablet by mouth daily 90 tablet 1    atorvastatin (LIPITOR) 10 MG tablet Take 1 tablet by mouth daily 90 tablet 1    enalapril (VASOTEC) 20 MG tablet Take 1 tablet by mouth 2 times daily 180 tablet 1    hydrochlorothiazide (HYDRODIURIL) 25 MG tablet Take 1 tablet by mouth daily 90 tablet 1    aspirin 81 MG tablet Take 81 mg by mouth daily.  latanoprost (XALATAN) 0.005 % ophthalmic solution 1 drop nightly. No current facility-administered medications for this visit. Past Medical History:   Diagnosis Date    Colonoscopy refused     Glaucoma     Dr. Rob Molina Hyperlipidemia     Hypertension     Major depressive disorder with single episode 9/13/2018    Patient has depression and agitation. Was started on the Lexapro.     Obesity      Past Surgical History:   Procedure Laterality Date    CATARACT REMOVAL Bilateral     CHOLECYSTECTOMY      HYSTERECTOMY Bilateral 1981    KNEE ARTHROSCOPY Left 2007    TOTAL KNEE ARTHROPLASTY Left 03/10/2017     Social History   Substance Use Topics    Smoking status: Never Smoker    Smokeless tobacco: Never Used    Alcohol use No       LAB REVIEW:  CBC:   Lab Results   Component Value Date    WBC 7.8 09/13/2018    HGB 12.3 09/13/2018    HCT 40.3 09/13/2018    PLT 98 09/13/2018     Lipids:   Lab Results   Component Value Date    CHOLFAST 179 07/02/2018    TRIGLYCFAST 66 07/02/2018    LDLDIRECT 92 07/02/2018    HDL 90 07/02/2018    CHOL 198 04/22/2017    TRIG 93 04/22/2017    LDLCALC 72 12/01/2014     Renal:   Lab Results   Component Value Date    BUN 21 09/13/2018    CREATININE 1.1 09/13/2018     09/13/2018    K 4.6 09/13/2018    ALT 11 09/13/2018    AST 21 09/13/2018    GLUCOSE 112 04/22/2017     PT/INR: No results found for: INR  A1C: No results found for: Evon Villela MD, 9/21/2018 , 11:29 AM

## 2018-09-23 PROBLEM — R63.4 WEIGHT LOSS: Status: ACTIVE | Noted: 2018-09-23

## 2018-09-23 PROBLEM — M54.42 BILATERAL LOW BACK PAIN WITH LEFT-SIDED SCIATICA: Status: ACTIVE | Noted: 2018-09-23

## 2018-09-23 PROBLEM — D69.6 THROMBOCYTOPENIA (HCC): Status: ACTIVE | Noted: 2018-09-23

## 2018-09-23 ASSESSMENT — ENCOUNTER SYMPTOMS
NAUSEA: 0
DIARRHEA: 0
ABDOMINAL PAIN: 0
HEARTBURN: 0
VOMITING: 0
COUGH: 0
CONSTIPATION: 0
BACK PAIN: 1
SPUTUM PRODUCTION: 0
GASTROINTESTINAL NEGATIVE: 1
RESPIRATORY NEGATIVE: 1
BLOOD IN STOOL: 0

## 2018-09-23 NOTE — ASSESSMENT & PLAN NOTE
Patient has weight loss in the last year about 30-40 pounds. Patient is under treatment for depression  Discussed in detail about workup. Blood test done. Discussed and GI workup but patient is not interested at this time. We will get a chest x-ray.   Follow-up in one month

## 2018-10-02 ENCOUNTER — HOSPITAL ENCOUNTER (OUTPATIENT)
Dept: ULTRASOUND IMAGING | Age: 83
Discharge: HOME | End: 2018-10-02
Payer: MEDICARE

## 2018-10-02 DIAGNOSIS — D69.6 THROMBOCYTOPENIA (HCC): ICD-10-CM

## 2018-10-02 DIAGNOSIS — D53.9 MACROCYTIC ANEMIA: ICD-10-CM

## 2018-10-02 PROCEDURE — 76700 US EXAM ABDOM COMPLETE: CPT

## 2018-10-03 ENCOUNTER — HOSPITAL ENCOUNTER (OUTPATIENT)
Age: 83
Setting detail: SPECIMEN
Discharge: HOME | End: 2018-10-03
Payer: MEDICARE

## 2018-10-03 LAB
HEPATITIS B SURFACE ANTIGEN: NON REACTIVE
HEPATITIS C ANTIBODY: NON REACTIVE
LACTATE DEHYDROGENASE: 202 IU/L (ref 120–246)
RETICULOCYTE COUNT PCT: 1 % (ref 0.2–2.2)

## 2018-10-03 PROCEDURE — 88262 CHROMOSOME ANALYSIS 15-20: CPT

## 2018-10-03 PROCEDURE — 88185 FLOWCYTOMETRY/TC ADD-ON: CPT

## 2018-10-03 PROCEDURE — 86709 HEPATITIS A IGM ANTIBODY: CPT

## 2018-10-03 PROCEDURE — 88271 CYTOGENETICS DNA PROBE: CPT

## 2018-10-03 PROCEDURE — 88275 CYTOGENETICS 100-300: CPT

## 2018-10-03 PROCEDURE — 85045 AUTOMATED RETICULOCYTE COUNT: CPT

## 2018-10-03 PROCEDURE — 86803 HEPATITIS C AB TEST: CPT

## 2018-10-03 PROCEDURE — 86704 HEP B CORE ANTIBODY TOTAL: CPT

## 2018-10-03 PROCEDURE — 83615 LACTATE (LD) (LDH) ENZYME: CPT

## 2018-10-03 PROCEDURE — 88313 SPECIAL STAINS GROUP 2: CPT

## 2018-10-03 PROCEDURE — 88184 FLOWCYTOMETRY/ TC 1 MARKER: CPT

## 2018-10-03 PROCEDURE — 87340 HEPATITIS B SURFACE AG IA: CPT

## 2018-10-03 PROCEDURE — 88237 TISSUE CULTURE BONE MARROW: CPT

## 2018-10-03 PROCEDURE — 88182 CELL MARKER STUDY: CPT

## 2018-10-05 LAB
HEPATITIS B CORE TOTAL ANTIBODY: NEGATIVE
Lab: NORMAL
TEST NAME: NORMAL

## 2018-10-22 ENCOUNTER — OFFICE VISIT (OUTPATIENT)
Dept: INTERNAL MEDICINE CLINIC | Age: 83
End: 2018-10-22
Payer: MEDICARE

## 2018-10-22 VITALS
BODY MASS INDEX: 30.02 KG/M2 | WEIGHT: 186.8 LBS | DIASTOLIC BLOOD PRESSURE: 86 MMHG | HEIGHT: 66 IN | RESPIRATION RATE: 14 BRPM | HEART RATE: 77 BPM | OXYGEN SATURATION: 98 % | SYSTOLIC BLOOD PRESSURE: 138 MMHG

## 2018-10-22 DIAGNOSIS — M54.42 BILATERAL LOW BACK PAIN WITH LEFT-SIDED SCIATICA, UNSPECIFIED CHRONICITY: ICD-10-CM

## 2018-10-22 DIAGNOSIS — Z23 NEED FOR IMMUNIZATION AGAINST INFLUENZA: ICD-10-CM

## 2018-10-22 DIAGNOSIS — D46.9 MYELODYSPLASTIC SYNDROME (HCC): Primary | ICD-10-CM

## 2018-10-22 DIAGNOSIS — Z23 NEED FOR INFLUENZA VACCINATION: ICD-10-CM

## 2018-10-22 DIAGNOSIS — D69.6 THROMBOCYTOPENIA (HCC): ICD-10-CM

## 2018-10-22 DIAGNOSIS — F32.9 MAJOR DEPRESSIVE DISORDER WITH SINGLE EPISODE, REMISSION STATUS UNSPECIFIED: ICD-10-CM

## 2018-10-22 DIAGNOSIS — H40.9 GLAUCOMA OF BOTH EYES, UNSPECIFIED GLAUCOMA TYPE: ICD-10-CM

## 2018-10-22 DIAGNOSIS — E78.2 MIXED HYPERLIPIDEMIA: ICD-10-CM

## 2018-10-22 DIAGNOSIS — I10 ESSENTIAL HYPERTENSION: ICD-10-CM

## 2018-10-22 LAB
CHROMOSOME, BONE MARROW: NORMAL
GDT REPLACEMENT: NORMAL

## 2018-10-22 PROCEDURE — 1101F PT FALLS ASSESS-DOCD LE1/YR: CPT | Performed by: INTERNAL MEDICINE

## 2018-10-22 PROCEDURE — 90662 IIV NO PRSV INCREASED AG IM: CPT | Performed by: INTERNAL MEDICINE

## 2018-10-22 PROCEDURE — G0008 ADMIN INFLUENZA VIRUS VAC: HCPCS | Performed by: INTERNAL MEDICINE

## 2018-10-22 PROCEDURE — G8427 DOCREV CUR MEDS BY ELIG CLIN: HCPCS | Performed by: INTERNAL MEDICINE

## 2018-10-22 PROCEDURE — 1090F PRES/ABSN URINE INCON ASSESS: CPT | Performed by: INTERNAL MEDICINE

## 2018-10-22 PROCEDURE — 1036F TOBACCO NON-USER: CPT | Performed by: INTERNAL MEDICINE

## 2018-10-22 PROCEDURE — G8417 CALC BMI ABV UP PARAM F/U: HCPCS | Performed by: INTERNAL MEDICINE

## 2018-10-22 PROCEDURE — 99213 OFFICE O/P EST LOW 20 MIN: CPT | Performed by: INTERNAL MEDICINE

## 2018-10-22 PROCEDURE — 4040F PNEUMOC VAC/ADMIN/RCVD: CPT | Performed by: INTERNAL MEDICINE

## 2018-10-22 PROCEDURE — 1123F ACP DISCUSS/DSCN MKR DOCD: CPT | Performed by: INTERNAL MEDICINE

## 2018-10-22 PROCEDURE — G8482 FLU IMMUNIZE ORDER/ADMIN: HCPCS | Performed by: INTERNAL MEDICINE

## 2018-10-22 PROCEDURE — G8399 PT W/DXA RESULTS DOCUMENT: HCPCS | Performed by: INTERNAL MEDICINE

## 2018-11-28 DIAGNOSIS — F32.A DEPRESSION, UNSPECIFIED DEPRESSION TYPE: ICD-10-CM

## 2018-11-28 RX ORDER — ESCITALOPRAM OXALATE 10 MG/1
10 TABLET ORAL DAILY
Qty: 90 TABLET | Refills: 1 | Status: SHIPPED | OUTPATIENT
Start: 2018-11-28 | End: 2019-10-03

## 2018-11-28 RX ORDER — ENALAPRIL MALEATE 20 MG/1
20 TABLET ORAL 2 TIMES DAILY
Qty: 180 TABLET | Refills: 1 | Status: SHIPPED | OUTPATIENT
Start: 2018-11-28 | End: 2019-10-03 | Stop reason: ALTCHOICE

## 2018-11-28 RX ORDER — HYDROCHLOROTHIAZIDE 25 MG/1
25 TABLET ORAL DAILY
Qty: 90 TABLET | Refills: 1 | Status: SHIPPED | OUTPATIENT
Start: 2018-11-28 | End: 2019-10-03 | Stop reason: ALTCHOICE

## 2018-11-28 RX ORDER — ATORVASTATIN CALCIUM 10 MG/1
10 TABLET, FILM COATED ORAL DAILY
Qty: 90 TABLET | Refills: 1 | Status: SHIPPED | OUTPATIENT
Start: 2018-11-28 | End: 2019-10-03 | Stop reason: SDUPTHER

## 2018-11-28 RX ORDER — AMLODIPINE BESYLATE 10 MG/1
10 TABLET ORAL DAILY
Qty: 90 TABLET | Refills: 1 | Status: SHIPPED | OUTPATIENT
Start: 2018-11-28 | End: 2019-07-16 | Stop reason: SDUPTHER

## 2019-01-08 ENCOUNTER — HOSPITAL ENCOUNTER (OUTPATIENT)
Age: 84
Discharge: HOME | End: 2019-01-08
Payer: MEDICARE

## 2019-01-08 LAB
ALBUMIN SERPL-MCNC: 4 GM/DL (ref 3.4–5)
ALP BLD-CCNC: 95 IU/L (ref 40–129)
ALT SERPL-CCNC: 10 U/L (ref 10–40)
ANION GAP SERPL CALCULATED.3IONS-SCNC: 11 MMOL/L (ref 4–16)
AST SERPL-CCNC: 18 IU/L (ref 15–37)
BASOPHILS ABSOLUTE: 0.1 K/CU MM
BASOPHILS RELATIVE PERCENT: 0.6 % (ref 0–1)
BILIRUB SERPL-MCNC: 0.4 MG/DL (ref 0–1)
BUN BLDV-MCNC: 24 MG/DL (ref 6–23)
CALCIUM SERPL-MCNC: 10 MG/DL (ref 8.3–10.6)
CHLORIDE BLD-SCNC: 103 MMOL/L (ref 99–110)
CO2: 30 MMOL/L (ref 21–32)
CREAT SERPL-MCNC: 1.2 MG/DL (ref 0.6–1.1)
DIFFERENTIAL TYPE: ABNORMAL
EOSINOPHILS ABSOLUTE: 0.1 K/CU MM
EOSINOPHILS RELATIVE PERCENT: 1.4 % (ref 0–3)
GFR AFRICAN AMERICAN: 52 ML/MIN/1.73M2
GFR NON-AFRICAN AMERICAN: 43 ML/MIN/1.73M2
GLUCOSE BLD-MCNC: 106 MG/DL (ref 70–99)
HCT VFR BLD CALC: 40 % (ref 37–47)
HEMOGLOBIN: 12.5 GM/DL (ref 12.5–16)
IMMATURE NEUTROPHIL %: 0.2 % (ref 0–0.43)
LYMPHOCYTES ABSOLUTE: 2.7 K/CU MM
LYMPHOCYTES RELATIVE PERCENT: 33.4 % (ref 24–44)
MCH RBC QN AUTO: 32.8 PG (ref 27–31)
MCHC RBC AUTO-ENTMCNC: 31.3 % (ref 32–36)
MCV RBC AUTO: 105 FL (ref 78–100)
MONOCYTES ABSOLUTE: 0.7 K/CU MM
MONOCYTES RELATIVE PERCENT: 8.7 % (ref 0–4)
PDW BLD-RTO: 13.1 % (ref 11.7–14.9)
PLATELET # BLD: 92 K/CU MM (ref 140–440)
PMV BLD AUTO: 11.5 FL (ref 7.5–11.1)
POTASSIUM SERPL-SCNC: 4.3 MMOL/L (ref 3.5–5.1)
RBC # BLD: 3.81 M/CU MM (ref 4.2–5.4)
SEGMENTED NEUTROPHILS ABSOLUTE COUNT: 4.5 K/CU MM
SEGMENTED NEUTROPHILS RELATIVE PERCENT: 55.7 % (ref 36–66)
SODIUM BLD-SCNC: 144 MMOL/L (ref 135–145)
TOTAL IMMATURE NEUTOROPHIL: 0.02 K/CU MM
TOTAL PROTEIN: 6.9 GM/DL (ref 6.4–8.2)
WBC # BLD: 8 K/CU MM (ref 4–10.5)

## 2019-01-08 PROCEDURE — 36415 COLL VENOUS BLD VENIPUNCTURE: CPT

## 2019-01-08 PROCEDURE — 80053 COMPREHEN METABOLIC PANEL: CPT

## 2019-01-08 PROCEDURE — 85025 COMPLETE CBC W/AUTO DIFF WBC: CPT

## 2019-05-08 ENCOUNTER — HOSPITAL ENCOUNTER (OUTPATIENT)
Age: 84
Discharge: HOME | End: 2019-05-08
Payer: MEDICARE

## 2019-05-08 LAB
ALBUMIN SERPL-MCNC: 3.7 GM/DL (ref 3.4–5)
ALP BLD-CCNC: 83 IU/L (ref 40–129)
ALT SERPL-CCNC: 12 U/L (ref 10–40)
ANION GAP SERPL CALCULATED.3IONS-SCNC: 5 MMOL/L (ref 4–16)
AST SERPL-CCNC: 19 IU/L (ref 15–37)
BASOPHILS ABSOLUTE: 0 K/CU MM
BASOPHILS RELATIVE PERCENT: 0.6 % (ref 0–1)
BILIRUB SERPL-MCNC: 0.3 MG/DL (ref 0–1)
BUN BLDV-MCNC: 26 MG/DL (ref 6–23)
CALCIUM SERPL-MCNC: 9.2 MG/DL (ref 8.3–10.6)
CHLORIDE BLD-SCNC: 106 MMOL/L (ref 99–110)
CO2: 29 MMOL/L (ref 21–32)
CREAT SERPL-MCNC: 1.2 MG/DL (ref 0.6–1.1)
DIFFERENTIAL TYPE: ABNORMAL
EOSINOPHILS ABSOLUTE: 0.1 K/CU MM
EOSINOPHILS RELATIVE PERCENT: 1.1 % (ref 0–3)
GFR AFRICAN AMERICAN: 52 ML/MIN/1.73M2
GFR NON-AFRICAN AMERICAN: 43 ML/MIN/1.73M2
GLUCOSE BLD-MCNC: 104 MG/DL (ref 70–99)
HCT VFR BLD CALC: 35.9 % (ref 37–47)
HEMOGLOBIN: 11.4 GM/DL (ref 12.5–16)
IMMATURE NEUTROPHIL %: 0.2 % (ref 0–0.43)
LYMPHOCYTES ABSOLUTE: 2 K/CU MM
LYMPHOCYTES RELATIVE PERCENT: 31.4 % (ref 24–44)
MCH RBC QN AUTO: 33.2 PG (ref 27–31)
MCHC RBC AUTO-ENTMCNC: 31.8 % (ref 32–36)
MCV RBC AUTO: 104.7 FL (ref 78–100)
MONOCYTES ABSOLUTE: 0.6 K/CU MM
MONOCYTES RELATIVE PERCENT: 8.7 % (ref 0–4)
PDW BLD-RTO: 13.2 % (ref 11.7–14.9)
PLATELET # BLD: 116 K/CU MM (ref 140–440)
PMV BLD AUTO: 11.8 FL (ref 7.5–11.1)
POTASSIUM SERPL-SCNC: 4.7 MMOL/L (ref 3.5–5.1)
RBC # BLD: 3.43 M/CU MM (ref 4.2–5.4)
SEGMENTED NEUTROPHILS ABSOLUTE COUNT: 3.7 K/CU MM
SEGMENTED NEUTROPHILS RELATIVE PERCENT: 58 % (ref 36–66)
SODIUM BLD-SCNC: 140 MMOL/L (ref 135–145)
TOTAL IMMATURE NEUTOROPHIL: 0.01 K/CU MM
TOTAL PROTEIN: 6.4 GM/DL (ref 6.4–8.2)
WBC # BLD: 6.4 K/CU MM (ref 4–10.5)

## 2019-05-08 PROCEDURE — 85025 COMPLETE CBC W/AUTO DIFF WBC: CPT

## 2019-05-08 PROCEDURE — 36415 COLL VENOUS BLD VENIPUNCTURE: CPT

## 2019-05-08 PROCEDURE — 80053 COMPREHEN METABOLIC PANEL: CPT

## 2019-07-16 ENCOUNTER — OFFICE VISIT (OUTPATIENT)
Dept: INTERNAL MEDICINE CLINIC | Age: 84
End: 2019-07-16
Payer: MEDICARE

## 2019-07-16 VITALS
RESPIRATION RATE: 16 BRPM | WEIGHT: 182 LBS | SYSTOLIC BLOOD PRESSURE: 132 MMHG | HEART RATE: 69 BPM | DIASTOLIC BLOOD PRESSURE: 80 MMHG | BODY MASS INDEX: 29.25 KG/M2 | HEIGHT: 66 IN | OXYGEN SATURATION: 92 %

## 2019-07-16 DIAGNOSIS — E78.2 MIXED HYPERLIPIDEMIA: ICD-10-CM

## 2019-07-16 DIAGNOSIS — M25.561 CHRONIC PAIN OF RIGHT KNEE: Primary | ICD-10-CM

## 2019-07-16 DIAGNOSIS — R63.4 WEIGHT LOSS: ICD-10-CM

## 2019-07-16 DIAGNOSIS — D46.9 MYELODYSPLASTIC SYNDROME (HCC): ICD-10-CM

## 2019-07-16 DIAGNOSIS — Z96.652 STATUS POST TOTAL LEFT KNEE REPLACEMENT: ICD-10-CM

## 2019-07-16 DIAGNOSIS — M54.42 BILATERAL LOW BACK PAIN WITH LEFT-SIDED SCIATICA, UNSPECIFIED CHRONICITY: ICD-10-CM

## 2019-07-16 DIAGNOSIS — D69.6 THROMBOCYTOPENIA (HCC): ICD-10-CM

## 2019-07-16 DIAGNOSIS — I10 ESSENTIAL HYPERTENSION: ICD-10-CM

## 2019-07-16 DIAGNOSIS — F32.9 MAJOR DEPRESSIVE DISORDER WITH SINGLE EPISODE, REMISSION STATUS UNSPECIFIED: ICD-10-CM

## 2019-07-16 DIAGNOSIS — G89.29 CHRONIC PAIN OF RIGHT KNEE: Primary | ICD-10-CM

## 2019-07-16 DIAGNOSIS — H40.9 GLAUCOMA OF BOTH EYES, UNSPECIFIED GLAUCOMA TYPE: ICD-10-CM

## 2019-07-16 PROCEDURE — 1036F TOBACCO NON-USER: CPT | Performed by: INTERNAL MEDICINE

## 2019-07-16 PROCEDURE — G8417 CALC BMI ABV UP PARAM F/U: HCPCS | Performed by: INTERNAL MEDICINE

## 2019-07-16 PROCEDURE — 99214 OFFICE O/P EST MOD 30 MIN: CPT | Performed by: INTERNAL MEDICINE

## 2019-07-16 PROCEDURE — G8427 DOCREV CUR MEDS BY ELIG CLIN: HCPCS | Performed by: INTERNAL MEDICINE

## 2019-07-16 PROCEDURE — 4040F PNEUMOC VAC/ADMIN/RCVD: CPT | Performed by: INTERNAL MEDICINE

## 2019-07-16 PROCEDURE — 1123F ACP DISCUSS/DSCN MKR DOCD: CPT | Performed by: INTERNAL MEDICINE

## 2019-07-16 PROCEDURE — 1090F PRES/ABSN URINE INCON ASSESS: CPT | Performed by: INTERNAL MEDICINE

## 2019-07-16 PROCEDURE — G8399 PT W/DXA RESULTS DOCUMENT: HCPCS | Performed by: INTERNAL MEDICINE

## 2019-07-16 RX ORDER — LOSARTAN POTASSIUM 100 MG/1
100 TABLET ORAL DAILY
Qty: 90 TABLET | Refills: 1 | Status: SHIPPED | OUTPATIENT
Start: 2019-07-16 | End: 2019-10-03 | Stop reason: SDUPTHER

## 2019-07-16 RX ORDER — AMLODIPINE BESYLATE 10 MG/1
10 TABLET ORAL DAILY
Qty: 90 TABLET | Refills: 1 | Status: SHIPPED | OUTPATIENT
Start: 2019-07-16 | End: 2019-10-03 | Stop reason: SDUPTHER

## 2019-07-16 RX ORDER — ENALAPRIL MALEATE 20 MG/1
20 TABLET ORAL 2 TIMES DAILY
Qty: 180 TABLET | Refills: 1 | Status: CANCELLED | OUTPATIENT
Start: 2019-07-16

## 2019-07-16 NOTE — PROGRESS NOTES
ABDOMEN: - Soft and non-tender,no masses  ororganomegaly. EXTREMITIES: - No cyanosis, clubbing, or significant edema. No tenderness noted. SKIN: Skin is warm and dry. NEUROLOGICAL: - Cranial nerves II through XII are grossly intact. Patient walks with cane. IMPRESSION:    Encounter Diagnoses   Name Primary?  Status post total left knee replacement     Bilateral low back pain with left-sided sciatica, unspecified chronicity     Essential hypertension     Glaucoma of both eyes, unspecified glaucoma type     Major depressive disorder with single episode, remission status unspecified     Mixed hyperlipidemia     Myelodysplastic syndrome ([address]     Thrombocytopenia ([address]     Weight loss        ASSESSMENT/PLAN:    Bilateral low back pain with left-sided sciatica  Patient has pain in multiple years. Lower back pain, both hip pains and pain around the left knee. Patient has orthopedic surgeons in [place] at [facility] and also a spine doctor. Advised patient to follow-up with them   Pt states he has pain in the back . Difficulty ambulating . Essential hypertension  Hypertension in control. Follow low salt diet. Continue current treatment. Pt is on enalapril 20 mg bid, amlodipine 10 mg daily and HCTZ     Glaucoma of both eyes  Pt sees Dr Melara Chain eye drops in both eyes    Major depressive disorder with single episode  Patient has depression and agitation. Pt is taking lexapro and that is helping     Mixed hyperlipidemia  Hyperlipidemia is stable. Follow low cholesterol diet. Continue current treatment. Continue atorvastatin 10 mg daily  Lipid profile     [address]elodysplastic syndrome ([facility] [address]  Dx 10/2018 : Myelodysplastic syndrome with multilineage dysplasia  Dr Sarai Dodge    Status post total left knee replacement  Pt had  Left TKA on 3/2017 in [place] Dr Cameron Romero. Antibiotic prophylaxis recommended by ortho when needed. Continues to have pain in the left knee.   Has pain in the right knee also. Thrombocytopenia [facility])  Patient is referred to hematologist Dr. Angel Sanchez  Platelet count of 73,[phone],885  Platelets have improved. Weight loss  Pt's weight is stable     Return to office in 3 months. Mediations reviewed with the patient. Continue current medications. Appropriate prescriptions are addressed. After visit summeryprovided. Follow up as directed sooner if needed. Questions answered and patient verbalizes understanding. Call for any problems, questions, or concerns. No Known Allergies  Current Outpatient Medications   Medication Sig Dispense Refill    hydrochlorothiazide (HYDRODIURIL) 25 MG tablet Take 1 tablet by mouth daily 90 tablet 1    escitalopram (LEXAPRO) 10 MG tablet Take 1 tablet by mouth daily 90 tablet 1    enalapril (VASOTEC) 20 MG tablet Take 1 tablet by mouth 2 times daily 180 tablet 1    atorvastatin (LIPITOR) 10 MG tablet Take 1 tablet by mouth daily 90 tablet 1    amLODIPine (NORVASC) 10 MG tablet Take 1 tablet by mouth daily 90 tablet 1    aspirin 81 MG tablet Take 81 mg by mouth daily.  latanoprost (XALATAN) 0.005 % ophthalmic solution 1 drop nightly. No current facility-administered medications for this visit. Past Medical History:   Diagnosis Date    Colonoscopy refused     Glaucoma     Dr. Soto Manual Hyperlipidemia     Hypertension     Major depressive disorder with single episode 9/13/2018    Patient has depression and agitation. Was started on the Lexapro.     Myelodysplastic syndrome ([facility] [address] 10/22/2018    Dx 10/2018 : Myelodysplastic syndrome with multilineage dysplasia Dr Angel Sanchez    Obesity     Thrombocytopenia ([facility] [address] 9/23/2018    Patient is referred to hematologist     Past Surgical History:   Procedure Laterality Date    CATARACT REMOVAL Bilateral     CHOLECYSTECTOMY      HYSTERECTOMY Bilateral 1981    KNEE ARTHROSCOPY Left 2007    TOTAL KNEE ARTHROPLASTY Left 03/10/2017     Social History Tobacco Use    Smoking status: Never Smoker    Smokeless tobacco: Never Used   Substance Use Topics    Alcohol use: No     Alcohol/week: 0.0 standard drinks       LAB REVIEW:  CBC:   Lab Results   Component Value Date    WBC 6.4 05/08/2019    HGB 11.4 05/08/2019    HCT 35.9 05/08/2019     05/08/2019     Lipids:   Lab Results   Component Value Date    CHOL 198 04/22/2017    TRIG 93 04/22/2017    HDL 90 07/02/2018    LDLCALC 72 12/01/2014    LDLDIRECT 92 07/02/2018    TRIGLYCFAST 66 07/02/2018     Renal:   Lab Results   Component Value Date    BUN 26 05/08/2019    CREATININE 1.2 05/08/2019     05/08/2019    K 4.7 05/08/2019    ALT 12 05/08/2019    AST 19 05/08/2019    GLUCOSE 104 05/08/2019     PT/INR: No results found for: INR  A1C: No results found for: Suzy Delacruz MD, 7/16/2019 , 2:33 PM

## 2019-07-17 ENCOUNTER — HOSPITAL ENCOUNTER (OUTPATIENT)
Dept: GENERAL RADIOLOGY | Age: 84
Discharge: HOME | End: 2019-07-17
Payer: MEDICARE

## 2019-07-17 ENCOUNTER — TELEPHONE (OUTPATIENT)
Dept: INTERNAL MEDICINE CLINIC | Age: 84
End: 2019-07-17

## 2019-07-17 ENCOUNTER — HOSPITAL ENCOUNTER (OUTPATIENT)
Age: 84
Discharge: HOME | End: 2019-07-17
Payer: MEDICARE

## 2019-07-17 DIAGNOSIS — G89.29 CHRONIC PAIN OF RIGHT KNEE: ICD-10-CM

## 2019-07-17 DIAGNOSIS — M25.561 CHRONIC PAIN OF RIGHT KNEE: ICD-10-CM

## 2019-07-17 DIAGNOSIS — Z96.652 STATUS POST TOTAL LEFT KNEE REPLACEMENT: ICD-10-CM

## 2019-07-17 PROCEDURE — 73560 X-RAY EXAM OF KNEE 1 OR 2: CPT

## 2019-07-22 ASSESSMENT — ENCOUNTER SYMPTOMS
WHEEZING: 0
EYES NEGATIVE: 1
ALLERGIC/IMMUNOLOGIC NEGATIVE: 1
BACK PAIN: 1
RESPIRATORY NEGATIVE: 1
SHORTNESS OF BREATH: 0
COUGH: 0

## 2019-09-25 ENCOUNTER — OFFICE VISIT (OUTPATIENT)
Dept: INTERNAL MEDICINE CLINIC | Age: 84
End: 2019-09-25
Payer: MEDICARE

## 2019-09-25 ENCOUNTER — HOSPITAL ENCOUNTER (OUTPATIENT)
Age: 84
Discharge: HOME | End: 2019-09-25
Payer: MEDICARE

## 2019-09-25 VITALS
HEART RATE: 74 BPM | DIASTOLIC BLOOD PRESSURE: 80 MMHG | SYSTOLIC BLOOD PRESSURE: 128 MMHG | OXYGEN SATURATION: 97 % | WEIGHT: 179 LBS | BODY MASS INDEX: 28.77 KG/M2 | HEIGHT: 66 IN

## 2019-09-25 DIAGNOSIS — R35.0 INCREASED URINARY FREQUENCY: ICD-10-CM

## 2019-09-25 DIAGNOSIS — R44.3 HALLUCINATION: ICD-10-CM

## 2019-09-25 DIAGNOSIS — F32.9 MAJOR DEPRESSIVE DISORDER WITH SINGLE EPISODE, REMISSION STATUS UNSPECIFIED: ICD-10-CM

## 2019-09-25 DIAGNOSIS — R41.82 ALTERED MENTAL STATUS, UNSPECIFIED ALTERED MENTAL STATUS TYPE: Primary | ICD-10-CM

## 2019-09-25 DIAGNOSIS — I10 ESSENTIAL HYPERTENSION: ICD-10-CM

## 2019-09-25 DIAGNOSIS — D46.9 MYELODYSPLASTIC SYNDROME (HCC): ICD-10-CM

## 2019-09-25 LAB
ALBUMIN SERPL-MCNC: 4 GM/DL (ref 3.4–5)
ALP BLD-CCNC: 78 IU/L (ref 40–129)
ALT SERPL-CCNC: 17 U/L (ref 10–40)
ANION GAP SERPL CALCULATED.3IONS-SCNC: 11 MMOL/L (ref 4–16)
AST SERPL-CCNC: 31 IU/L (ref 15–37)
BACTERIA: NORMAL /HPF
BASOPHILS ABSOLUTE: 0 K/CU MM
BASOPHILS RELATIVE PERCENT: 0.4 % (ref 0–1)
BILIRUB SERPL-MCNC: 0.5 MG/DL (ref 0–1)
BILIRUBIN URINE: NEGATIVE MG/DL
BLOOD, URINE: NEGATIVE
BUN BLDV-MCNC: 40 MG/DL (ref 6–23)
CALCIUM SERPL-MCNC: 9.6 MG/DL (ref 8.3–10.6)
CAST TYPE: NORMAL /HPF
CHLORIDE BLD-SCNC: 103 MMOL/L (ref 99–110)
CLARITY: NORMAL
CO2: 28 MMOL/L (ref 21–32)
COLOR: YELLOW
COMMENT UA: NORMAL
CREAT SERPL-MCNC: 1.9 MG/DL (ref 0.6–1.1)
CRYSTAL TYPE: NEGATIVE /HPF
DIFFERENTIAL TYPE: ABNORMAL
EOSINOPHILS ABSOLUTE: 0.1 K/CU MM
EOSINOPHILS RELATIVE PERCENT: 1.3 % (ref 0–3)
EPITHELIAL CELLS, UA: NORMAL /HPF
GFR AFRICAN AMERICAN: 31 ML/MIN/1.73M2
GFR NON-AFRICAN AMERICAN: 25 ML/MIN/1.73M2
GLUCOSE BLD-MCNC: 105 MG/DL (ref 70–99)
GLUCOSE, URINE: NEGATIVE MG/DL
HCT VFR BLD CALC: 36.1 % (ref 37–47)
HEMOGLOBIN: 11.3 GM/DL (ref 12.5–16)
IMMATURE NEUTROPHIL %: 0.1 % (ref 0–0.43)
KETONES, URINE: NEGATIVE MG/DL
LEUKOCYTE ESTERASE, URINE: NEGATIVE
LYMPHOCYTES ABSOLUTE: 1.8 K/CU MM
LYMPHOCYTES RELATIVE PERCENT: 26.3 % (ref 24–44)
MCH RBC QN AUTO: 32.9 PG (ref 27–31)
MCHC RBC AUTO-ENTMCNC: 31.3 % (ref 32–36)
MCV RBC AUTO: 105.2 FL (ref 78–100)
MONOCYTES ABSOLUTE: 0.6 K/CU MM
MONOCYTES RELATIVE PERCENT: 8.4 % (ref 0–4)
NITRITE URINE, QUANTITATIVE: NEGATIVE
PDW BLD-RTO: 13.9 % (ref 11.7–14.9)
PH, URINE: 5.5 (ref 5–8)
PLATELET # BLD: 150 K/CU MM (ref 140–440)
PMV BLD AUTO: 11.6 FL (ref 7.5–11.1)
POTASSIUM SERPL-SCNC: 4.3 MMOL/L (ref 3.5–5.1)
PROTEIN UA: NORMAL MG/DL
RBC # BLD: 3.43 M/CU MM (ref 4.2–5.4)
RBC URINE: NEGATIVE /HPF (ref 0–6)
SEGMENTED NEUTROPHILS ABSOLUTE COUNT: 4.3 K/CU MM
SEGMENTED NEUTROPHILS RELATIVE PERCENT: 63.5 % (ref 36–66)
SODIUM BLD-SCNC: 142 MMOL/L (ref 135–145)
SPECIFIC GRAVITY UA: 1.02 (ref 1–1.03)
TOTAL IMMATURE NEUTOROPHIL: 0.01 K/CU MM
TOTAL PROTEIN: 6.7 GM/DL (ref 6.4–8.2)
TSH HIGH SENSITIVITY: 0.95 UIU/ML (ref 0.27–4.2)
UROBILINOGEN, URINE: 0.2 MG/DL (ref 0.2–1)
WBC # BLD: 6.8 K/CU MM (ref 4–10.5)
WBC UA: NEGATIVE /HPF (ref 0–5)

## 2019-09-25 PROCEDURE — 84443 ASSAY THYROID STIM HORMONE: CPT

## 2019-09-25 PROCEDURE — 80053 COMPREHEN METABOLIC PANEL: CPT

## 2019-09-25 PROCEDURE — 81001 URINALYSIS AUTO W/SCOPE: CPT

## 2019-09-25 PROCEDURE — 99214 OFFICE O/P EST MOD 30 MIN: CPT | Performed by: INTERNAL MEDICINE

## 2019-09-25 PROCEDURE — 1036F TOBACCO NON-USER: CPT | Performed by: INTERNAL MEDICINE

## 2019-09-25 PROCEDURE — 1123F ACP DISCUSS/DSCN MKR DOCD: CPT | Performed by: INTERNAL MEDICINE

## 2019-09-25 PROCEDURE — 36415 COLL VENOUS BLD VENIPUNCTURE: CPT

## 2019-09-25 PROCEDURE — G8427 DOCREV CUR MEDS BY ELIG CLIN: HCPCS | Performed by: INTERNAL MEDICINE

## 2019-09-25 PROCEDURE — 85025 COMPLETE CBC W/AUTO DIFF WBC: CPT

## 2019-09-25 PROCEDURE — G8417 CALC BMI ABV UP PARAM F/U: HCPCS | Performed by: INTERNAL MEDICINE

## 2019-09-25 PROCEDURE — 4040F PNEUMOC VAC/ADMIN/RCVD: CPT | Performed by: INTERNAL MEDICINE

## 2019-09-25 PROCEDURE — G8399 PT W/DXA RESULTS DOCUMENT: HCPCS | Performed by: INTERNAL MEDICINE

## 2019-09-25 PROCEDURE — 1090F PRES/ABSN URINE INCON ASSESS: CPT | Performed by: INTERNAL MEDICINE

## 2019-09-30 ENCOUNTER — TELEPHONE (OUTPATIENT)
Dept: INTERNAL MEDICINE CLINIC | Age: 84
End: 2019-09-30

## 2019-09-30 NOTE — TELEPHONE ENCOUNTER
Daughter Keshia Davis phoned asking for results of UA. Dr. Marv Sue advised, urine normal, no infection and instructed to see her urologist. Verbal understanding returned.

## 2019-10-01 ENCOUNTER — HOSPITAL ENCOUNTER (OUTPATIENT)
Dept: CT IMAGING | Age: 84
Discharge: HOME | End: 2019-10-01
Payer: MEDICARE

## 2019-10-01 DIAGNOSIS — R44.3 HALLUCINATION: ICD-10-CM

## 2019-10-01 DIAGNOSIS — R41.82 ALTERED MENTAL STATUS, UNSPECIFIED ALTERED MENTAL STATUS TYPE: ICD-10-CM

## 2019-10-01 PROCEDURE — 70450 CT HEAD/BRAIN W/O DYE: CPT

## 2019-10-03 ENCOUNTER — OFFICE VISIT (OUTPATIENT)
Dept: INTERNAL MEDICINE CLINIC | Age: 84
End: 2019-10-03
Payer: MEDICARE

## 2019-10-03 VITALS
HEART RATE: 80 BPM | SYSTOLIC BLOOD PRESSURE: 118 MMHG | DIASTOLIC BLOOD PRESSURE: 68 MMHG | TEMPERATURE: 97.3 F | OXYGEN SATURATION: 96 % | BODY MASS INDEX: 28.6 KG/M2 | WEIGHT: 177.2 LBS | RESPIRATION RATE: 20 BRPM

## 2019-10-03 DIAGNOSIS — E78.2 MIXED HYPERLIPIDEMIA: ICD-10-CM

## 2019-10-03 DIAGNOSIS — I10 ESSENTIAL HYPERTENSION: ICD-10-CM

## 2019-10-03 DIAGNOSIS — N28.9 RENAL INSUFFICIENCY: Primary | ICD-10-CM

## 2019-10-03 DIAGNOSIS — D69.6 THROMBOCYTOPENIA (HCC): ICD-10-CM

## 2019-10-03 DIAGNOSIS — H40.9 GLAUCOMA OF BOTH EYES, UNSPECIFIED GLAUCOMA TYPE: ICD-10-CM

## 2019-10-03 DIAGNOSIS — D46.9 MYELODYSPLASTIC SYNDROME (HCC): ICD-10-CM

## 2019-10-03 DIAGNOSIS — R63.4 WEIGHT LOSS: ICD-10-CM

## 2019-10-03 DIAGNOSIS — F32.9 MAJOR DEPRESSIVE DISORDER WITH SINGLE EPISODE, REMISSION STATUS UNSPECIFIED: ICD-10-CM

## 2019-10-03 PROCEDURE — G8484 FLU IMMUNIZE NO ADMIN: HCPCS | Performed by: INTERNAL MEDICINE

## 2019-10-03 PROCEDURE — 1036F TOBACCO NON-USER: CPT | Performed by: INTERNAL MEDICINE

## 2019-10-03 PROCEDURE — 4040F PNEUMOC VAC/ADMIN/RCVD: CPT | Performed by: INTERNAL MEDICINE

## 2019-10-03 PROCEDURE — 1090F PRES/ABSN URINE INCON ASSESS: CPT | Performed by: INTERNAL MEDICINE

## 2019-10-03 PROCEDURE — G8417 CALC BMI ABV UP PARAM F/U: HCPCS | Performed by: INTERNAL MEDICINE

## 2019-10-03 PROCEDURE — 1123F ACP DISCUSS/DSCN MKR DOCD: CPT | Performed by: INTERNAL MEDICINE

## 2019-10-03 PROCEDURE — G8427 DOCREV CUR MEDS BY ELIG CLIN: HCPCS | Performed by: INTERNAL MEDICINE

## 2019-10-03 PROCEDURE — 99214 OFFICE O/P EST MOD 30 MIN: CPT | Performed by: INTERNAL MEDICINE

## 2019-10-03 PROCEDURE — G8399 PT W/DXA RESULTS DOCUMENT: HCPCS | Performed by: INTERNAL MEDICINE

## 2019-10-03 RX ORDER — LOSARTAN POTASSIUM 100 MG/1
100 TABLET ORAL DAILY
Qty: 90 TABLET | Refills: 1 | Status: SHIPPED | OUTPATIENT
Start: 2019-10-03 | End: 2020-04-07 | Stop reason: SDUPTHER

## 2019-10-03 RX ORDER — AMLODIPINE BESYLATE 10 MG/1
10 TABLET ORAL DAILY
Qty: 90 TABLET | Refills: 1 | Status: SHIPPED | OUTPATIENT
Start: 2019-10-03 | End: 2020-04-07 | Stop reason: SDUPTHER

## 2019-10-03 RX ORDER — OXYBUTYNIN CHLORIDE 5 MG/1
5 TABLET, EXTENDED RELEASE ORAL DAILY
Qty: 30 TABLET | Refills: 3 | Status: SHIPPED | OUTPATIENT
Start: 2019-10-03 | End: 2019-10-17 | Stop reason: ALTCHOICE

## 2019-10-03 RX ORDER — ATORVASTATIN CALCIUM 10 MG/1
10 TABLET, FILM COATED ORAL DAILY
Qty: 90 TABLET | Refills: 1 | Status: SHIPPED | OUTPATIENT
Start: 2019-10-03 | End: 2020-04-07 | Stop reason: SDUPTHER

## 2019-10-04 ENCOUNTER — HOSPITAL ENCOUNTER (OUTPATIENT)
Dept: ULTRASOUND IMAGING | Age: 84
Discharge: HOME | End: 2019-10-04
Payer: MEDICARE

## 2019-10-04 DIAGNOSIS — N28.9 RENAL INSUFFICIENCY: ICD-10-CM

## 2019-10-04 PROCEDURE — 76775 US EXAM ABDO BACK WALL LIM: CPT

## 2019-10-10 ENCOUNTER — NURSE ONLY (OUTPATIENT)
Dept: INTERNAL MEDICINE CLINIC | Age: 84
End: 2019-10-10
Payer: MEDICARE

## 2019-10-10 DIAGNOSIS — E78.2 MIXED HYPERLIPIDEMIA: Primary | ICD-10-CM

## 2019-10-10 DIAGNOSIS — I10 ESSENTIAL HYPERTENSION: ICD-10-CM

## 2019-10-10 DIAGNOSIS — R53.83 OTHER FATIGUE: ICD-10-CM

## 2019-10-10 LAB
A/G RATIO: 2 (ref 1.1–2.2)
ALBUMIN SERPL-MCNC: 4.3 G/DL (ref 3.4–5)
ALP BLD-CCNC: 67 U/L (ref 40–129)
ALT SERPL-CCNC: 16 U/L (ref 10–40)
ANION GAP SERPL CALCULATED.3IONS-SCNC: 13 MMOL/L (ref 3–16)
AST SERPL-CCNC: 22 U/L (ref 15–37)
BILIRUB SERPL-MCNC: 0.4 MG/DL (ref 0–1)
BUN BLDV-MCNC: 29 MG/DL (ref 7–20)
CALCIUM SERPL-MCNC: 9.8 MG/DL (ref 8.3–10.6)
CHLORIDE BLD-SCNC: 105 MMOL/L (ref 99–110)
CHOLESTEROL, FASTING: 188 MG/DL (ref 0–199)
CO2: 24 MMOL/L (ref 21–32)
CREAT SERPL-MCNC: 1.2 MG/DL (ref 0.6–1.2)
GFR AFRICAN AMERICAN: 52
GFR NON-AFRICAN AMERICAN: 43
GLOBULIN: 2.2 G/DL
GLUCOSE BLD-MCNC: 101 MG/DL (ref 70–99)
HCT VFR BLD CALC: 35.9 % (ref 36–48)
HDLC SERPL-MCNC: 83 MG/DL (ref 40–60)
HEMOGLOBIN: 12.1 G/DL (ref 12–16)
LDL CHOLESTEROL CALCULATED: 86 MG/DL
MCH RBC QN AUTO: 33.8 PG (ref 26–34)
MCHC RBC AUTO-ENTMCNC: 33.7 G/DL (ref 31–36)
MCV RBC AUTO: 100.3 FL (ref 80–100)
PDW BLD-RTO: 14.6 % (ref 12.4–15.4)
PLATELET # BLD: 118 K/UL (ref 135–450)
PMV BLD AUTO: 9.5 FL (ref 5–10.5)
POTASSIUM SERPL-SCNC: 5.3 MMOL/L (ref 3.5–5.1)
RBC # BLD: 3.58 M/UL (ref 4–5.2)
SODIUM BLD-SCNC: 142 MMOL/L (ref 136–145)
TOTAL PROTEIN: 6.5 G/DL (ref 6.4–8.2)
TRIGLYCERIDE, FASTING: 95 MG/DL (ref 0–150)
TSH SERPL DL<=0.05 MIU/L-ACNC: 1.7 UIU/ML (ref 0.27–4.2)
VLDLC SERPL CALC-MCNC: 19 MG/DL
WBC # BLD: 6.4 K/UL (ref 4–11)

## 2019-10-10 PROCEDURE — 36415 COLL VENOUS BLD VENIPUNCTURE: CPT | Performed by: INTERNAL MEDICINE

## 2019-10-13 PROBLEM — N28.9 RENAL INSUFFICIENCY: Status: ACTIVE | Noted: 2019-10-13

## 2019-10-17 ENCOUNTER — OFFICE VISIT (OUTPATIENT)
Dept: INTERNAL MEDICINE CLINIC | Age: 84
End: 2019-10-17
Payer: MEDICARE

## 2019-10-17 VITALS
HEART RATE: 64 BPM | TEMPERATURE: 98.2 F | DIASTOLIC BLOOD PRESSURE: 72 MMHG | WEIGHT: 187.6 LBS | RESPIRATION RATE: 16 BRPM | SYSTOLIC BLOOD PRESSURE: 122 MMHG | OXYGEN SATURATION: 96 % | BODY MASS INDEX: 30.28 KG/M2

## 2019-10-17 DIAGNOSIS — R63.4 WEIGHT LOSS: ICD-10-CM

## 2019-10-17 DIAGNOSIS — E78.2 MIXED HYPERLIPIDEMIA: ICD-10-CM

## 2019-10-17 DIAGNOSIS — H40.9 GLAUCOMA OF BOTH EYES, UNSPECIFIED GLAUCOMA TYPE: ICD-10-CM

## 2019-10-17 DIAGNOSIS — F32.9 MAJOR DEPRESSIVE DISORDER WITH SINGLE EPISODE, REMISSION STATUS UNSPECIFIED: ICD-10-CM

## 2019-10-17 DIAGNOSIS — D46.9 MYELODYSPLASTIC SYNDROME (HCC): ICD-10-CM

## 2019-10-17 DIAGNOSIS — M54.42 BILATERAL LOW BACK PAIN WITH LEFT-SIDED SCIATICA, UNSPECIFIED CHRONICITY: ICD-10-CM

## 2019-10-17 DIAGNOSIS — N28.9 RENAL INSUFFICIENCY: ICD-10-CM

## 2019-10-17 DIAGNOSIS — I10 ESSENTIAL HYPERTENSION: Primary | ICD-10-CM

## 2019-10-17 DIAGNOSIS — Z91.81 AT HIGH RISK FOR FALLS: ICD-10-CM

## 2019-10-17 DIAGNOSIS — D69.6 THROMBOCYTOPENIA (HCC): ICD-10-CM

## 2019-10-17 PROBLEM — Z96.652 STATUS POST TOTAL LEFT KNEE REPLACEMENT: Status: RESOLVED | Noted: 2017-04-27 | Resolved: 2019-10-17

## 2019-10-17 PROCEDURE — 1123F ACP DISCUSS/DSCN MKR DOCD: CPT | Performed by: INTERNAL MEDICINE

## 2019-10-17 PROCEDURE — G8417 CALC BMI ABV UP PARAM F/U: HCPCS | Performed by: INTERNAL MEDICINE

## 2019-10-17 PROCEDURE — G8399 PT W/DXA RESULTS DOCUMENT: HCPCS | Performed by: INTERNAL MEDICINE

## 2019-10-17 PROCEDURE — G0008 ADMIN INFLUENZA VIRUS VAC: HCPCS | Performed by: INTERNAL MEDICINE

## 2019-10-17 PROCEDURE — 4040F PNEUMOC VAC/ADMIN/RCVD: CPT | Performed by: INTERNAL MEDICINE

## 2019-10-17 PROCEDURE — 90653 IIV ADJUVANT VACCINE IM: CPT | Performed by: INTERNAL MEDICINE

## 2019-10-17 PROCEDURE — 99214 OFFICE O/P EST MOD 30 MIN: CPT | Performed by: INTERNAL MEDICINE

## 2019-10-17 PROCEDURE — 1090F PRES/ABSN URINE INCON ASSESS: CPT | Performed by: INTERNAL MEDICINE

## 2019-10-17 PROCEDURE — G8482 FLU IMMUNIZE ORDER/ADMIN: HCPCS | Performed by: INTERNAL MEDICINE

## 2019-10-17 PROCEDURE — 1036F TOBACCO NON-USER: CPT | Performed by: INTERNAL MEDICINE

## 2019-10-17 PROCEDURE — G8427 DOCREV CUR MEDS BY ELIG CLIN: HCPCS | Performed by: INTERNAL MEDICINE

## 2019-10-17 RX ORDER — HYDROCHLOROTHIAZIDE 25 MG/1
25 TABLET ORAL DAILY
COMMUNITY
End: 2020-01-14 | Stop reason: SDUPTHER

## 2019-10-17 RX ORDER — POLYETHYLENE GLYCOL 3350 17 G/17G
17 POWDER, FOR SOLUTION ORAL DAILY PRN
Qty: 510 G | Refills: 0 | Status: SHIPPED | OUTPATIENT
Start: 2019-10-17 | End: 2019-11-16

## 2019-10-17 ASSESSMENT — ENCOUNTER SYMPTOMS
CONSTIPATION: 1
ALLERGIC/IMMUNOLOGIC NEGATIVE: 1
SHORTNESS OF BREATH: 0
STRIDOR: 0
COUGH: 0
EYES NEGATIVE: 1
VOMITING: 0
RESPIRATORY NEGATIVE: 1
DIARRHEA: 0

## 2019-11-11 ENCOUNTER — HOSPITAL ENCOUNTER (OUTPATIENT)
Age: 84
Setting detail: SPECIMEN
Discharge: HOME | End: 2019-11-11
Payer: MEDICARE

## 2019-11-11 LAB
ALBUMIN SERPL-MCNC: 3.9 GM/DL (ref 3.4–5)
ALP BLD-CCNC: 81 IU/L (ref 40–129)
ALT SERPL-CCNC: 14 U/L (ref 10–40)
ANION GAP SERPL CALCULATED.3IONS-SCNC: 10 MMOL/L (ref 4–16)
AST SERPL-CCNC: 22 IU/L (ref 15–37)
BILIRUB SERPL-MCNC: 0.4 MG/DL (ref 0–1)
BUN BLDV-MCNC: 29 MG/DL (ref 6–23)
CALCIUM SERPL-MCNC: 9.7 MG/DL (ref 8.3–10.6)
CHLORIDE BLD-SCNC: 102 MMOL/L (ref 99–110)
CO2: 28 MMOL/L (ref 21–32)
CREAT SERPL-MCNC: 1.3 MG/DL (ref 0.6–1.1)
GFR AFRICAN AMERICAN: 47 ML/MIN/1.73M2
GFR NON-AFRICAN AMERICAN: 39 ML/MIN/1.73M2
GLUCOSE BLD-MCNC: 98 MG/DL (ref 70–99)
POTASSIUM SERPL-SCNC: 3.9 MMOL/L (ref 3.5–5.1)
SODIUM BLD-SCNC: 140 MMOL/L (ref 135–145)
TOTAL PROTEIN: 6.6 GM/DL (ref 6.4–8.2)

## 2019-11-11 PROCEDURE — 80053 COMPREHEN METABOLIC PANEL: CPT

## 2019-12-23 ENCOUNTER — OFFICE VISIT (OUTPATIENT)
Dept: INTERNAL MEDICINE CLINIC | Age: 84
End: 2019-12-23
Payer: MEDICARE

## 2019-12-23 VITALS
BODY MASS INDEX: 29.76 KG/M2 | HEART RATE: 72 BPM | DIASTOLIC BLOOD PRESSURE: 78 MMHG | SYSTOLIC BLOOD PRESSURE: 120 MMHG | TEMPERATURE: 97 F | RESPIRATION RATE: 16 BRPM | OXYGEN SATURATION: 99 % | WEIGHT: 184.4 LBS

## 2019-12-23 DIAGNOSIS — N28.9 RENAL INSUFFICIENCY: ICD-10-CM

## 2019-12-23 DIAGNOSIS — D46.9 MYELODYSPLASTIC SYNDROME (HCC): ICD-10-CM

## 2019-12-23 DIAGNOSIS — I10 ESSENTIAL HYPERTENSION: ICD-10-CM

## 2019-12-23 DIAGNOSIS — F32.9 MAJOR DEPRESSIVE DISORDER WITH SINGLE EPISODE, REMISSION STATUS UNSPECIFIED: ICD-10-CM

## 2019-12-23 DIAGNOSIS — H40.9 GLAUCOMA OF BOTH EYES, UNSPECIFIED GLAUCOMA TYPE: ICD-10-CM

## 2019-12-23 DIAGNOSIS — D69.6 THROMBOCYTOPENIA (HCC): ICD-10-CM

## 2019-12-23 DIAGNOSIS — R63.4 WEIGHT LOSS: ICD-10-CM

## 2019-12-23 DIAGNOSIS — E78.2 MIXED HYPERLIPIDEMIA: ICD-10-CM

## 2019-12-23 PROCEDURE — G8427 DOCREV CUR MEDS BY ELIG CLIN: HCPCS | Performed by: INTERNAL MEDICINE

## 2019-12-23 PROCEDURE — G8399 PT W/DXA RESULTS DOCUMENT: HCPCS | Performed by: INTERNAL MEDICINE

## 2019-12-23 PROCEDURE — 4040F PNEUMOC VAC/ADMIN/RCVD: CPT | Performed by: INTERNAL MEDICINE

## 2019-12-23 PROCEDURE — 1090F PRES/ABSN URINE INCON ASSESS: CPT | Performed by: INTERNAL MEDICINE

## 2019-12-23 PROCEDURE — G8417 CALC BMI ABV UP PARAM F/U: HCPCS | Performed by: INTERNAL MEDICINE

## 2019-12-23 PROCEDURE — 1036F TOBACCO NON-USER: CPT | Performed by: INTERNAL MEDICINE

## 2019-12-23 PROCEDURE — 99213 OFFICE O/P EST LOW 20 MIN: CPT | Performed by: INTERNAL MEDICINE

## 2019-12-23 PROCEDURE — G8482 FLU IMMUNIZE ORDER/ADMIN: HCPCS | Performed by: INTERNAL MEDICINE

## 2019-12-23 PROCEDURE — 1123F ACP DISCUSS/DSCN MKR DOCD: CPT | Performed by: INTERNAL MEDICINE

## 2020-01-02 ENCOUNTER — TELEPHONE (OUTPATIENT)
Dept: INTERNAL MEDICINE CLINIC | Age: 85
End: 2020-01-02

## 2020-01-02 NOTE — TELEPHONE ENCOUNTER
Patient daughter Chuy Flor called in stating they would like referral for Neurology sent to [facility] office. Stated they will need copies of any imaging results and last office note. Fax to [phone]. Please advise if this is okay and put referral in.

## 2020-01-07 ENCOUNTER — TELEPHONE (OUTPATIENT)
Dept: INTERNAL MEDICINE CLINIC | Age: 85
End: 2020-01-07

## 2020-01-13 ENCOUNTER — TELEPHONE (OUTPATIENT)
Dept: INTERNAL MEDICINE CLINIC | Age: 85
End: 2020-01-13

## 2020-01-13 NOTE — TELEPHONE ENCOUNTER
Faxed referral, notes, testing, demographics and med list to Dr. Heaven Martinez office, Neurology. They will call the patient with an appointment. Left message informing patient's daughter-in-law. Fax confirmation received.

## 2020-01-14 RX ORDER — HYDROCHLOROTHIAZIDE 25 MG/1
25 TABLET ORAL DAILY
Qty: 90 TABLET | Refills: 1 | Status: SHIPPED | OUTPATIENT
Start: 2020-01-14 | End: 2020-06-22 | Stop reason: SDUPTHER

## 2020-02-27 ENCOUNTER — TELEPHONE (OUTPATIENT)
Dept: INTERNAL MEDICINE CLINIC | Age: 85
End: 2020-02-27

## 2020-02-27 NOTE — TELEPHONE ENCOUNTER
Patient called asking for a handicap placard. Will discuss upon 's return to office on Monday 3-2-2020.  Verbal understanding returned by patient

## 2020-03-12 ENCOUNTER — HOSPITAL ENCOUNTER (OUTPATIENT)
Dept: INFUSION THERAPY | Age: 85
Discharge: HOME | End: 2020-03-12
Payer: MEDICARE

## 2020-03-12 LAB
BASOPHILS ABSOLUTE: 0 K/CU MM
BASOPHILS RELATIVE PERCENT: 0.5 % (ref 0–1)
DIFFERENTIAL TYPE: ABNORMAL
EOSINOPHILS ABSOLUTE: 0.2 K/CU MM
EOSINOPHILS RELATIVE PERCENT: 2.6 % (ref 0–3)
HCT VFR BLD CALC: 36.1 % (ref 37–47)
HEMOGLOBIN: 11.7 GM/DL (ref 12.5–16)
LYMPHOCYTES ABSOLUTE: 1.8 K/CU MM
LYMPHOCYTES RELATIVE PERCENT: 28.9 % (ref 24–44)
MCH RBC QN AUTO: 33.1 PG (ref 27–31)
MCHC RBC AUTO-ENTMCNC: 32.4 % (ref 32–36)
MCV RBC AUTO: 102 FL (ref 78–100)
MONOCYTES ABSOLUTE: 0.5 K/CU MM
MONOCYTES RELATIVE PERCENT: 8.9 % (ref 0–4)
PDW BLD-RTO: 12.8 % (ref 11.7–14.9)
PLATELET # BLD: 75 K/CU MM (ref 140–440)
PMV BLD AUTO: 12.1 FL (ref 7.5–11.1)
RBC # BLD: 3.54 M/CU MM (ref 4.2–5.4)
SEGMENTED NEUTROPHILS ABSOLUTE COUNT: 3.6 K/CU MM
SEGMENTED NEUTROPHILS RELATIVE PERCENT: 59.1 % (ref 36–66)
WBC # BLD: 6.1 K/CU MM (ref 4–10.5)

## 2020-03-12 PROCEDURE — 36415 COLL VENOUS BLD VENIPUNCTURE: CPT

## 2020-03-12 PROCEDURE — 85025 COMPLETE CBC W/AUTO DIFF WBC: CPT

## 2020-03-12 PROCEDURE — 99211 OFF/OP EST MAY X REQ PHY/QHP: CPT

## 2020-04-07 RX ORDER — AMLODIPINE BESYLATE 10 MG/1
10 TABLET ORAL DAILY
Qty: 90 TABLET | Refills: 0 | Status: SHIPPED | OUTPATIENT
Start: 2020-04-07 | End: 2020-05-27

## 2020-04-07 RX ORDER — LOSARTAN POTASSIUM 100 MG/1
100 TABLET ORAL DAILY
Qty: 90 TABLET | Refills: 0 | Status: SHIPPED | OUTPATIENT
Start: 2020-04-07 | End: 2020-05-27

## 2020-04-07 RX ORDER — ATORVASTATIN CALCIUM 10 MG/1
10 TABLET, FILM COATED ORAL DAILY
Qty: 90 TABLET | Refills: 0 | Status: SHIPPED | OUTPATIENT
Start: 2020-04-07 | End: 2020-05-27

## 2020-05-28 RX ORDER — ATORVASTATIN CALCIUM 10 MG/1
TABLET, FILM COATED ORAL
Qty: 90 TABLET | Refills: 1 | Status: SHIPPED | OUTPATIENT
Start: 2020-05-28 | End: 2020-07-29 | Stop reason: SDUPTHER

## 2020-05-28 RX ORDER — LOSARTAN POTASSIUM 100 MG/1
TABLET ORAL
Qty: 90 TABLET | Refills: 1 | Status: SHIPPED | OUTPATIENT
Start: 2020-05-28 | End: 2020-07-29 | Stop reason: SDUPTHER

## 2020-05-28 RX ORDER — AMLODIPINE BESYLATE 10 MG/1
TABLET ORAL
Qty: 90 TABLET | Refills: 1 | Status: SHIPPED | OUTPATIENT
Start: 2020-05-28 | End: 2020-07-29 | Stop reason: SDUPTHER

## 2020-06-22 ENCOUNTER — OFFICE VISIT (OUTPATIENT)
Dept: INTERNAL MEDICINE CLINIC | Age: 85
End: 2020-06-22
Payer: MEDICARE

## 2020-06-22 VITALS
WEIGHT: 179.2 LBS | RESPIRATION RATE: 16 BRPM | DIASTOLIC BLOOD PRESSURE: 70 MMHG | TEMPERATURE: 97.6 F | BODY MASS INDEX: 28.92 KG/M2 | SYSTOLIC BLOOD PRESSURE: 122 MMHG | HEART RATE: 80 BPM | OXYGEN SATURATION: 98 %

## 2020-06-22 PROBLEM — R41.89 COGNITIVE IMPAIRMENT: Status: ACTIVE | Noted: 2020-06-22

## 2020-06-22 PROBLEM — G20 PARKINSONISM (HCC): Status: ACTIVE | Noted: 2020-06-22

## 2020-06-22 PROBLEM — G20.C PARKINSONISM: Status: ACTIVE | Noted: 2020-06-22

## 2020-06-22 PROBLEM — R44.3 HALLUCINATION: Status: ACTIVE | Noted: 2020-06-22

## 2020-06-22 PROCEDURE — G8417 CALC BMI ABV UP PARAM F/U: HCPCS | Performed by: INTERNAL MEDICINE

## 2020-06-22 PROCEDURE — G8399 PT W/DXA RESULTS DOCUMENT: HCPCS | Performed by: INTERNAL MEDICINE

## 2020-06-22 PROCEDURE — G8427 DOCREV CUR MEDS BY ELIG CLIN: HCPCS | Performed by: INTERNAL MEDICINE

## 2020-06-22 PROCEDURE — 1123F ACP DISCUSS/DSCN MKR DOCD: CPT | Performed by: INTERNAL MEDICINE

## 2020-06-22 PROCEDURE — 4040F PNEUMOC VAC/ADMIN/RCVD: CPT | Performed by: INTERNAL MEDICINE

## 2020-06-22 PROCEDURE — 36415 COLL VENOUS BLD VENIPUNCTURE: CPT | Performed by: INTERNAL MEDICINE

## 2020-06-22 PROCEDURE — 99214 OFFICE O/P EST MOD 30 MIN: CPT | Performed by: INTERNAL MEDICINE

## 2020-06-22 PROCEDURE — 1090F PRES/ABSN URINE INCON ASSESS: CPT | Performed by: INTERNAL MEDICINE

## 2020-06-22 PROCEDURE — 1036F TOBACCO NON-USER: CPT | Performed by: INTERNAL MEDICINE

## 2020-06-22 RX ORDER — HYDROCHLOROTHIAZIDE 25 MG/1
25 TABLET ORAL DAILY
Qty: 90 TABLET | Refills: 1 | Status: SHIPPED | OUTPATIENT
Start: 2020-06-22 | End: 2020-09-23

## 2020-06-22 RX ORDER — TOLTERODINE 2 MG/1
2 CAPSULE, EXTENDED RELEASE ORAL DAILY
COMMUNITY

## 2020-06-22 RX ORDER — QUETIAPINE FUMARATE 25 MG/1
TABLET, FILM COATED ORAL
Status: ON HOLD | COMMUNITY
End: 2021-07-20 | Stop reason: HOSPADM

## 2020-06-22 ASSESSMENT — ENCOUNTER SYMPTOMS
EYES NEGATIVE: 1
COUGH: 0
GASTROINTESTINAL NEGATIVE: 1
SHORTNESS OF BREATH: 0
RESPIRATORY NEGATIVE: 1
WHEEZING: 0

## 2020-06-22 NOTE — ASSESSMENT & PLAN NOTE
Dx 10/2018 : Myelodysplastic syndrome with multilineage dysplasia  Dr Tyler December : has appointment next month.
Hypertension in control. Follow low salt diet. Continue current treatment.   Pt is on losartan 100 mg , amlodipine 10 mg daily
Last platelets 75 K in 9/4980
Patient has depression and agitation. She stopped taking medications. Doing well.
no back pain, no gout, no musculoskeletal pain, no neck pain, and no weakness.

## 2020-06-22 NOTE — PROGRESS NOTES
felt,  CARDIOVASCULAR: - Normal S1 and S2    PULMONARY: - No respiratory distress. No wheezes or rales. ABDOMEN: - Soft and non-tender,no masses  ororganomegaly. EXTREMITIES: - No cyanosis, clubbing, or significant edema. Arthritic changes. SKIN: Skin is warm and dry. NEUROLOGICAL: - Cranial nerves II through XII are grossly intact. Tremors and stiffness. Walks with help of walker. Patient does not know her age but knows her date of birth and president. She is unable to subtract 7 from 100. IMPRESSION:    Encounter Diagnoses   Name Primary?  Essential hypertension     Major depressive disorder with single episode, remission status unspecified     Mixed hyperlipidemia     Myelodysplastic syndrome (HCC)     Renal insufficiency     Thrombocytopenia (HCC)     Weight loss     Hallucination     Parkinsonism, unspecified Parkinsonism type ([address]        ASSESSMENT/PLAN:    Essential hypertension  Hypertension in control. Follow low salt diet. Continue current treatment. Pt is on losartan 100 mg , amlodipine 10 mg daily    Major depressive disorder with single episode  Patient has depression and agitation. She stopped taking medications. Doing well. Mixed hyperlipidemia  Hyperlipidemia is stable. Follow low cholesterol diet. Continue current treatment. Continue atorvastatin 10 mg daily    Myelodysplastic syndrome ([facility]  Dx 10/2018 : Myelodysplastic syndrome with multilineage dysplasia  Dr Liang Gonzalez : has appointment next month. Renal insufficiency  Recheck BMP    Thrombocytopenia (HCC)  Last platelets 75 K in [id number]/9156    Weight loss  Patient lost another 5 lbs. Refused GI w/u    Parkinsonism [facility])  Patient seen neurologist Dr Olevia Merlin at [place]. Orders Placed This Encounter   Procedures    Comprehensive Metabolic Panel    Lipid, Fasting         Mediations reviewed with the patient. Continue current medications. Appropriate prescriptions are addressed.  [facility] Follow up as directed sooner if needed. Questions answered and patient verbalizes understanding. Call for any problems, questions, or concerns. No Known Allergies  Current Outpatient Medications   Medication Sig Dispense Refill    tolterodine (DETROL LA) 4 MG extended release capsule Take 4 mg by mouth daily Dr. Deborah Vieyra      QUEtiapine (SEROQUEL) 50 MG tablet Take 50 mg by mouth nightly      amLODIPine (NORVASC) 10 MG tablet TAKE 1 TABLET DAILY 90 tablet 1    losartan (COZAAR) 100 MG tablet TAKE 1 TABLET DAILY        DISCONTINUE LISINOPRIL 90 tablet 1    atorvastatin (LIPITOR) 10 MG tablet TAKE 1 TABLET DAILY 90 tablet 1    hydrochlorothiazide (HYDRODIURIL) 25 MG tablet Take 1 tablet by mouth daily 90 tablet 1    aspirin 81 MG tablet Take 81 mg by mouth daily.  latanoprost (XALATAN) 0.005 % ophthalmic solution 1 drop nightly.  Handicap Placard MISC by Does not apply route Please dispense one placard, duration 5 years 1 each 0     No current facility-administered medications for this visit. Past Medical History:   Diagnosis Date    Colonoscopy refused     Glaucoma     Dr. Gretta Kohler Hyperlipidemia     Hypertension     Major depressive disorder with single episode 9/13/2018    Patient has depression and agitation. Was started on the Lexapro.  Myelodysplastic syndrome ([address] 10/22/2018    Dx 10/2018 : Myelodysplastic syndrome with multilineage dysplasia Dr Liang Gonzalez    Obesity     Status post total left knee replacement 4/27/2017    Pt had  Left TKA on 3/2017 in [place] Dr Adam Russell. Antibiotic prophylaxis recommended by ortho when needed.     Thrombocytopenia ([address] 9/23/2018    Patient is referred to hematologist     Past Surgical History:   Procedure Laterality Date    CATARACT REMOVAL Bilateral     CHOLECYSTECTOMY      HYSTERECTOMY Bilateral 1981    KNEE ARTHROSCOPY Left 2007    TOTAL KNEE ARTHROPLASTY Left 03/10/2017     Social History     Tobacco Use    Smoking status: Never Smoker    Smokeless tobacco: Never Used   Substance Use Topics    Alcohol use: No     Alcohol/week: 0.0 standard drinks       LAB REVIEW:  CBC:   Lab Results   Component Value Date    WBC 6.1 03/12/2020    HGB 11.7 03/12/2020    HCT 36.1 03/12/2020    PLT 75 03/12/2020     Lipids:   Lab Results   Component Value Date    CHOL 198 04/22/2017    TRIG 93 04/22/2017    HDL 83 (H) 10/10/2019    LDLCALC 86 10/10/2019    LDLDIRECT 92 07/02/2018    TRIGLYCFAST 95 10/10/2019     Renal:   Lab Results   Component Value Date    BUN 29 11/11/2019    CREATININE 1.3 11/11/2019     11/11/2019    K 3.9 11/11/2019    ALT 14 11/11/2019    AST 22 11/11/2019    GLUCOSE 98 11/11/2019     PT/INR: No results found for: INR  A1C: No results found for: Curt Guillermo MD, 6/22/2020 , 12:06 PM

## 2020-06-23 LAB
A/G RATIO: 1.6 (ref 1.1–2.2)
ALBUMIN SERPL-MCNC: 4 G/DL (ref 3.4–5)
ALP BLD-CCNC: 86 U/L (ref 40–129)
ALT SERPL-CCNC: 10 U/L (ref 10–40)
ANION GAP SERPL CALCULATED.3IONS-SCNC: 16 MMOL/L (ref 3–16)
AST SERPL-CCNC: 15 U/L (ref 15–37)
BILIRUB SERPL-MCNC: 0.4 MG/DL (ref 0–1)
BUN BLDV-MCNC: 42 MG/DL (ref 7–20)
CALCIUM SERPL-MCNC: 9.6 MG/DL (ref 8.3–10.6)
CHLORIDE BLD-SCNC: 100 MMOL/L (ref 99–110)
CHOLESTEROL, FASTING: 192 MG/DL (ref 0–199)
CO2: 25 MMOL/L (ref 21–32)
CREAT SERPL-MCNC: 2.1 MG/DL (ref 0.6–1.2)
GFR AFRICAN AMERICAN: 27
GFR NON-AFRICAN AMERICAN: 22
GLOBULIN: 2.5 G/DL
GLUCOSE BLD-MCNC: 95 MG/DL (ref 70–99)
HDLC SERPL-MCNC: 83 MG/DL (ref 40–60)
LDL CHOLESTEROL CALCULATED: 96 MG/DL
POTASSIUM SERPL-SCNC: 4.3 MMOL/L (ref 3.5–5.1)
SODIUM BLD-SCNC: 141 MMOL/L (ref 136–145)
TOTAL PROTEIN: 6.5 G/DL (ref 6.4–8.2)
TRIGLYCERIDE, FASTING: 64 MG/DL (ref 0–150)
VLDLC SERPL CALC-MCNC: 13 MG/DL

## 2020-07-17 ENCOUNTER — HOSPITAL ENCOUNTER (OUTPATIENT)
Dept: INFUSION THERAPY | Age: 85
Discharge: HOME | End: 2020-07-17
Payer: MEDICARE

## 2020-07-17 LAB
BASOPHILS ABSOLUTE: 0 K/CU MM
BASOPHILS RELATIVE PERCENT: 0.5 % (ref 0–1)
DIFFERENTIAL TYPE: ABNORMAL
EOSINOPHILS ABSOLUTE: 0.1 K/CU MM
EOSINOPHILS RELATIVE PERCENT: 2.2 % (ref 0–3)
FERRITIN: 232 NG/ML (ref 15–150)
FOLATE: 11.1 NG/ML (ref 3.1–17.5)
HCT VFR BLD CALC: 33.9 % (ref 37–47)
HEMOGLOBIN: 11.1 GM/DL (ref 12.5–16)
IRON: 107 UG/DL (ref 37–145)
LYMPHOCYTES ABSOLUTE: 1.5 K/CU MM
LYMPHOCYTES RELATIVE PERCENT: 26 % (ref 24–44)
MCH RBC QN AUTO: 32.9 PG (ref 27–31)
MCHC RBC AUTO-ENTMCNC: 32.7 % (ref 32–36)
MCV RBC AUTO: 100.6 FL (ref 78–100)
MONOCYTES ABSOLUTE: 0.5 K/CU MM
MONOCYTES RELATIVE PERCENT: 9.3 % (ref 0–4)
PCT TRANSFERRIN: 46 % (ref 10–44)
PDW BLD-RTO: 13.1 % (ref 11.7–14.9)
PLATELET # BLD: 102 K/CU MM (ref 140–440)
PMV BLD AUTO: 11.8 FL (ref 7.5–11.1)
RBC # BLD: 3.37 M/CU MM (ref 4.2–5.4)
SEGMENTED NEUTROPHILS ABSOLUTE COUNT: 3.6 K/CU MM
SEGMENTED NEUTROPHILS RELATIVE PERCENT: 62 % (ref 36–66)
T4 FREE: 1.3 NG/DL (ref 0.9–1.8)
TOTAL IRON BINDING CAPACITY: 235 UG/DL (ref 250–450)
TSH HIGH SENSITIVITY: 1.57 UIU/ML (ref 0.27–4.2)
UNSATURATED IRON BINDING CAPACITY: 128 UG/DL (ref 110–370)
VITAMIN B-12: 491.8 PG/ML (ref 211–911)
WBC # BLD: 5.8 K/CU MM (ref 4–10.5)

## 2020-07-17 PROCEDURE — 36415 COLL VENOUS BLD VENIPUNCTURE: CPT

## 2020-07-17 PROCEDURE — 83550 IRON BINDING TEST: CPT

## 2020-07-17 PROCEDURE — 83540 ASSAY OF IRON: CPT

## 2020-07-17 PROCEDURE — 85025 COMPLETE CBC W/AUTO DIFF WBC: CPT

## 2020-07-17 PROCEDURE — 82607 VITAMIN B-12: CPT

## 2020-07-17 PROCEDURE — 82746 ASSAY OF FOLIC ACID SERUM: CPT

## 2020-07-17 PROCEDURE — 84439 ASSAY OF FREE THYROXINE: CPT

## 2020-07-17 PROCEDURE — 82728 ASSAY OF FERRITIN: CPT

## 2020-07-17 PROCEDURE — 84443 ASSAY THYROID STIM HORMONE: CPT

## 2020-07-27 ENCOUNTER — HOSPITAL ENCOUNTER (OUTPATIENT)
Dept: ULTRASOUND IMAGING | Age: 85
Discharge: HOME | End: 2020-07-27
Payer: MEDICARE

## 2020-07-27 PROCEDURE — 76775 US EXAM ABDO BACK WALL LIM: CPT

## 2020-07-29 RX ORDER — ATORVASTATIN CALCIUM 10 MG/1
TABLET, FILM COATED ORAL
Qty: 90 TABLET | Refills: 1 | Status: SHIPPED | OUTPATIENT
Start: 2020-07-29 | End: 2021-01-05 | Stop reason: SDUPTHER

## 2020-07-29 RX ORDER — LOSARTAN POTASSIUM 100 MG/1
TABLET ORAL
Qty: 90 TABLET | Refills: 1 | Status: SHIPPED | OUTPATIENT
Start: 2020-07-29 | End: 2021-01-05 | Stop reason: SDUPTHER

## 2020-07-29 RX ORDER — AMLODIPINE BESYLATE 10 MG/1
TABLET ORAL
Qty: 90 TABLET | Refills: 1 | Status: SHIPPED | OUTPATIENT
Start: 2020-07-29 | End: 2021-01-05 | Stop reason: SDUPTHER

## 2020-09-23 ENCOUNTER — OFFICE VISIT (OUTPATIENT)
Dept: INTERNAL MEDICINE CLINIC | Age: 85
End: 2020-09-23
Payer: MEDICARE

## 2020-09-23 VITALS
WEIGHT: 183.6 LBS | OXYGEN SATURATION: 98 % | DIASTOLIC BLOOD PRESSURE: 76 MMHG | RESPIRATION RATE: 16 BRPM | BODY MASS INDEX: 29.63 KG/M2 | TEMPERATURE: 97.4 F | SYSTOLIC BLOOD PRESSURE: 130 MMHG | HEART RATE: 80 BPM

## 2020-09-23 LAB
ANION GAP SERPL CALCULATED.3IONS-SCNC: 9 MMOL/L (ref 3–16)
BUN BLDV-MCNC: 25 MG/DL (ref 7–20)
CALCIUM SERPL-MCNC: 9 MG/DL (ref 8.3–10.6)
CHLORIDE BLD-SCNC: 109 MMOL/L (ref 99–110)
CO2: 23 MMOL/L (ref 21–32)
CREAT SERPL-MCNC: 1.4 MG/DL (ref 0.6–1.2)
GFR AFRICAN AMERICAN: 43
GFR NON-AFRICAN AMERICAN: 36
GLUCOSE BLD-MCNC: 91 MG/DL (ref 70–99)
POTASSIUM SERPL-SCNC: 4.3 MMOL/L (ref 3.5–5.1)
SODIUM BLD-SCNC: 141 MMOL/L (ref 136–145)

## 2020-09-23 PROCEDURE — G8417 CALC BMI ABV UP PARAM F/U: HCPCS | Performed by: INTERNAL MEDICINE

## 2020-09-23 PROCEDURE — 4040F PNEUMOC VAC/ADMIN/RCVD: CPT | Performed by: INTERNAL MEDICINE

## 2020-09-23 PROCEDURE — 1090F PRES/ABSN URINE INCON ASSESS: CPT | Performed by: INTERNAL MEDICINE

## 2020-09-23 PROCEDURE — G8399 PT W/DXA RESULTS DOCUMENT: HCPCS | Performed by: INTERNAL MEDICINE

## 2020-09-23 PROCEDURE — 1123F ACP DISCUSS/DSCN MKR DOCD: CPT | Performed by: INTERNAL MEDICINE

## 2020-09-23 PROCEDURE — G8427 DOCREV CUR MEDS BY ELIG CLIN: HCPCS | Performed by: INTERNAL MEDICINE

## 2020-09-23 PROCEDURE — 99214 OFFICE O/P EST MOD 30 MIN: CPT | Performed by: INTERNAL MEDICINE

## 2020-09-23 PROCEDURE — 1036F TOBACCO NON-USER: CPT | Performed by: INTERNAL MEDICINE

## 2020-09-23 PROCEDURE — 90694 VACC AIIV4 NO PRSRV 0.5ML IM: CPT | Performed by: INTERNAL MEDICINE

## 2020-09-23 PROCEDURE — G0008 ADMIN INFLUENZA VIRUS VAC: HCPCS | Performed by: INTERNAL MEDICINE

## 2020-09-23 RX ORDER — POLYETHYLENE GLYCOL 3350 17 G/17G
17 POWDER, FOR SOLUTION ORAL DAILY
COMMUNITY
End: 2021-03-30 | Stop reason: DRUGHIGH

## 2020-09-23 NOTE — ASSESSMENT & PLAN NOTE
Hypertension in control. Follow low salt diet. Continue current treatment.   Pt is on losartan 100 mg , amlodipine 10 mg daily

## 2020-09-23 NOTE — PROGRESS NOTES
Reinaldo Pretty  Patient's  is 1935  Seen in office on 2020      SUBJECTIVE:  Sparkle  negra 82 y. o.year old female presents today   Chief Complaint   Patient presents with    3 Month Follow-Up    Other     wants flu vaccine     Pt is here for f/u   Pt has seen  Dr Zackary Duane for dementia, parkinsonism and hallucination  She increased Sinemet tid and seroquel 50 mg daily   states her stiffness has improved  Patient has hypertension. Taking medications No headaches, no chest pain, no palpitations and no dizziness. Patient has hyperlipidemia. Taking medications. No abdominal pain, no nausea or vomiting. No myalgias. Pt is not taking HCTZ and still has edema of the legs. Increase frequency better since HCTZ d/c . Pt is on detrol also. Taking medications regularly. No side effects noted. Review of Systems     Your system normal except as in HPI    OBJECTIVE: /76   Pulse 80   Temp 97.4 °F (36.3 °C) (Infrared)   Resp 16   Wt 183 lb 9.6 oz (83.3 kg)   SpO2 98%   BMI 29.63 kg/m²     Wt Readings from Last 3 Encounters:   20 183 lb 9.6 oz (83.3 kg)   20 179 lb 3.2 oz (81.3 kg)   19 184 lb 6.4 oz (83.6 kg)      GENERAL: - Alert, oriented, pleasant, in no apparent distress. HEENT: - Conjunctiva pink, no scleral icterus. ENT clear. NECK: -Supple. No jugular venous distention noted. No masses felt,  CARDIOVASCULAR: - Normal S1 and S2    PULMONARY: - No respiratory distress. No wheezes or rales. ABDOMEN: - Soft and non-tender,no masses  ororganomegaly. EXTREMITIES: - No cyanosis, clubbing, or significant edema. SKIN: Skin is warm and dry. NEUROLOGICAL: - Cranial nerves II through XII are grossly intact. IMPRESSION:    Encounter Diagnoses   Name Primary?     Essential hypertension     Mixed hyperlipidemia     Glaucoma of both eyes, unspecified glaucoma type     Major depressive disorder with single episode, remission status unspecified     Weight loss  Thrombocytopenia (HCC)     Bilateral low back pain with left-sided sciatica, unspecified chronicity     Myelodysplastic syndrome (HCC)     Renal insufficiency     Hallucination     Parkinsonism, unspecified Parkinsonism type ([facility]     Cognitive impairment        ASSESSMENT/PLAN:    Essential hypertension  Hypertension in control. Follow low salt diet. Continue current treatment. Pt is on losartan 100 mg , amlodipine 10 mg daily    Mixed hyperlipidemia  Hyperlipidemia is stable. Follow low cholesterol diet. Continue current treatment. Continue atorvastatin 10 mg daily    Glaucoma of both eyes  Pt sees Dr Abisai Crenshaw eye drops in both eyes    Major depressive disorder with single episode  Patient has depression and agitation. She is on seroquel 50 mg daily  Seen Dr Eboni Teague helping. Weight loss  Gaining weight. Thrombocytopenia [facility])  Patient is referred to hematologist Dr. Florine Bence  Platelet count of 47,575-09,526  Platelet has improved to 118,000 and 102 K  Has appt with Dr Cassia Willard next month. Bilateral low back pain with left-sided sciatica  Pt states she is not having pain. Myelodysplastic syndrome ([facility]  Dx 10/2018 : Myelodysplastic syndrome with multilineage dysplasia  Dr Florine Bence  Pt has appt with him next month. Renal insufficiency  Cr 1.8  K 4.1   recheck CMP    Hallucination  Pt is on seroquel 50 mg daily and that is helping. Parkinsonism [facility])  Patient seen neurologist Dr Maribel Ochoa at [place]. Pt is on sinemet 25/100 mg tid and that is helping/    Cognitive impairment  Pt has dementia and is on seroquel    Orders Placed This Encounter   Procedures    INFLUENZA, QUADV, ADJUVANTED, 65 YRS =, IM, PF, PREFILL SYR, 0.5ML (FLUAD)     Return to office in 3 months. Mediations reviewed with the patient. Continue current medications. Appropriate prescriptions are addressed. After visit [facility]. Follow up as directed sooner if needed. 03/10/2017     Social History     Tobacco Use    Smoking status: Never Smoker    Smokeless tobacco: Never Used   Substance Use Topics    Alcohol use: No     Alcohol/week: 0.0 standard drinks       LAB REVIEW:  CBC:   Lab Results   Component Value Date    WBC 5.8 07/17/2020    HGB 11.1 07/17/2020    HCT 33.9 07/17/2020     07/17/2020     Lipids:   Lab Results   Component Value Date    CHOL 198 04/22/2017    TRIG 93 04/22/2017    HDL 83 (H) 06/22/2020    LDLCALC 96 06/22/2020    LDLDIRECT 92 07/02/2018    TRIGLYCFAST 64 06/22/2020     Renal:   Lab Results   Component Value Date    BUN 41 07/14/2020    CREATININE 1.8 07/14/2020     07/14/2020    K 4.1 07/14/2020    ALT 10 06/22/2020    AST 15 06/22/2020    GLUCOSE 104 07/14/2020     PT/INR: No results found for: INR  A1C: No results found for: Parag Sheth MD, 9/23/2020 , 1:38 PM

## 2020-09-23 NOTE — PROGRESS NOTES
Vaccine Information Sheet, \"Influenza - Inactivated\"  given to Sofiya2 Amos Velazquez, or parent/legal guardian of  [facility] and verbalized understanding. Patient responses:    Have you ever had a reaction to a flu vaccine? No  Do you have any current illness? No  Have you ever had [place] Syndrome? No  Do you have a serious allergy to any of the follow: Neomycin, Polymyxin, Thimerosal, eggs or egg products? No    Flu vaccine given per order. Please see immunization tab. Risks and benefits explained. Current VIS given.

## 2020-09-23 NOTE — ASSESSMENT & PLAN NOTE
Patient seen neurologist Dr Reshma Kenny at [place].    Pt is on sinemet 25/100 mg tid and that is helping/

## 2020-09-23 NOTE — ASSESSMENT & PLAN NOTE
Hyperlipidemia is stable. Follow low cholesterol diet. Continue current treatment.   Continue atorvastatin 10 mg daily

## 2020-09-23 NOTE — ASSESSMENT & PLAN NOTE
Dx 10/2018 : Myelodysplastic syndrome with multilineage dysplasia  Dr Lucina Triplett  Pt has appt with him next month.

## 2020-09-25 ENCOUNTER — TELEPHONE (OUTPATIENT)
Dept: INTERNAL MEDICINE CLINIC | Age: 85
End: 2020-09-25

## 2020-10-12 PROBLEM — L98.9 DISORDER OF THE SKIN AND SUBCUTANEOUS TISSUE, UNSPECIFIED: Status: ACTIVE | Noted: 2020-10-12

## 2020-10-12 PROBLEM — D53.1 OTHER MEGALOBLASTIC ANEMIAS, NOT ELSEWHERE CLASSIFIED: Status: ACTIVE | Noted: 2020-10-12

## 2020-10-12 PROBLEM — R53.83 OTHER FATIGUE: Status: ACTIVE | Noted: 2020-10-12

## 2020-10-20 NOTE — PROGRESS NOTES
Patient Name: Pattie Castro  Patient : 1935  Patient MRN: [id number]     Primary Oncologist: Kerri Zheng MD  Referring Provider: Navjot Johnson MD       Date of Service: 10/21/2020      Chief Complaint:   Chief Complaint   Patient presents with    Discuss Labs        Problem List: Patient Active Problem List:     Essential hypertension     Mixed hyperlipidemia     Obesity     Glaucoma of both eyes     Major depressive disorder with single episode     Weight loss     Thrombocytopenia (HCC)     Bilateral low back pain with left-sided sciatica     Myelodysplastic syndrome ([address]     Renal insufficiency     Hallucination     Parkinsonism ([address]     Cognitive impairment     Other megaloblastic anemias, not elsewhere classified     Other fatigue     Disorder of the skin and subcutaneous tissue, unspecified        HPI: Siddharth Steward is a pleasant 57-year-old  female patient was referred for evaluation of macrocytic anemia and thrombocytopenia. I reviewed her blood tests records. In 2011 WBC was 6.6, hemoglobin 13.4, MCV 98.8, platelet 946. 2011 WBC was 6.7 hemoglobin 13.4, MCV 99. platelet 923. 2018 WBC was 7.6, hemoglobin 11.9, hematocrit 37.8, .7, platelets 62. In 2018 WBC was 7.8, hemoglobin 12.3, 104.9, platelet 98. B-12 was  387.5, folate was 14.4. CMP in 2018 was unremarkable except for creatinine 1.1. TSH in 2018 was 2.05. Venous Doppler study of the left lower extremity in 2017 was negative for DVT. We discussed the potential etiologies of macrocytic anemia and thrombocytopenia. .  She denies any alcohol. I order ultrasound of the liver and spleen. Mammogram was in 2017. She refuses colonoscopy. Bone marrow study on 2018 revealed cellular marrow with 3 lineage hematopoiesis with dysplasia in all 3 lines. Increased marrow iron stores, ring sideroblasts were not identified.   This finding appears classified as myelodysplastic syndrome with with multilineage dysplasia. FISH for MDS was negative. Cytogenetic study revealed normal female karyotype. Flow cytometry was negative for blast.  She likely has low risk myelodysplasia. We discussed about potential risk of acute leukemia. She is agreeable to continue with active surveillance. LDH was within normal limits. Acute viral hepatitis serology was negative. Ultrasound of the abdomen revealed incidental simple right kidney cyst.    Labs in May 2019 were reviewed. CBC was stable. Labs in November 2019 were reviewed. Labs in March 2020 were reviewed and stable. Platelet has been fluctuating. No need for platelet transfusion for now. On July 17, 2020 she came in for follow-up visit. She was seen by neurologist and diagnosed with Parkinson disease. She is placed on tolterodine and quetiapine. She is on baby aspirin. She gets bruises easily like related to aspirin and thin skin. She is currently doing rehab. She will follow-up with neurologist soon. I will check CBC, iron study, C64, folic level, TSH today. Presented on October 21, 2020 for scheduled follow up. She continues to have easy bruising likely secondary to ASA and other medications. She has recently had seroquel increased.  reports she has increased hallucinations. The patient reports she has periods of depression secondary to loss of ability to care for herself. She refused counseling referral. She occasional shortness of breath. No overt bleeding, no melena or hematochezia. We will continue to monitor CBC. She denied any acute pain. Denies any fever or chills. No nausea, vomiting or diarrhea. No dysuria or hematuria. I will consider repeat bone marrow study if CBC is worse. Past Medical History  Osteoarthritis, depression/anxiety, glaucoma, dyslipidemia, hypertension, obesity, anemia and thrombocytopenia.     Surgical History     Cataract surgery in 2009, cholecystectomy in early 90, right breast biopsy in 1989, arthroscopy Knee surgery in 2008 and left knee replacement in March 2017. Complete hysterectomy in 1981. Allergies  No known medication allergies    Medications  Escitalopram Oral 10 mg tablet  Enalapril Oral 20 mg tablet  Atorvastatin Oral 10 mg tablet  Amlodipine Oral 10 mg tablet  Hydrochlorothiazide Oral 25 mg tablet  Latanoprost Ophthalmic Drops 0.005 %    Social History  Smoking Status Never smoker  She denied any alcohol or illicit drug use. She has 5 children. Family History     Mother had tumor of stomach. Previous Therapies:  Observation     Current Therapy:       Interval History:  Presented for scheduled follow up. She reports she has lower energy. She has occasional shortness of breath. She denies worsening pain. Has easy bruising. She denies bleeding. Reports sometimes she feels \"like not waking up anymore\". She denies suicidal intent. She denies BRBPR. Occasional lower extremity edema. She reports her seroquel was recent increased.  reports she has increased hallucinations. Reports constipation with up to 10 days between BM. Has been using miralax, moved bowels the past two days. No abdominal pain. Review of Systems: \"Per interval history; otherwise 10 point ROS is negative. \"     Vital Signs:  /64 (Site: Right Upper Arm, Position: Sitting, Cuff Size: Large Adult)   Pulse 70   Temp 97.7 °F (36.5 °C) (Infrared)   Resp 16   Ht 5' 6\" (1.676 m)   Wt 188 lb 6.4 oz (85.5 kg)   SpO2 98%   BMI 30.41 kg/m²     Physical Exam:    CONSTITUTIONAL: awake, alert, cooperative, no apparent distress +walker  EYES: sclera clear and conjunctiva normal  ENT: Normocephalic, without obvious abnormality, atraumatic [place]  NECK: supple, symmetrical  HEMATOLOGIC/LYMPHATIC: no cervical, supraclavicular  lymphadenopathy   LUNGS: no increased work of breathing and clear to auscultation   CARDIOVASCULAR: regular rate and rhythm, normal S1 and S2, no murmur noted  ABDOMEN: normal bowel sounds x 4, soft, non-distended, non-tender  MUSCULOSKELETAL: full range of motion noted, tone is normal  NEUROLOGIC: awake, alert, oriented to name, place and time. Motor skills grossly intact. SKIN: Normal skin color, texture, turgor and no jaundice.  appears intact   EXTREMITIES: no LE edema     Labs:    Hematology:  Lab Results   Component Value Date    WBC 5.8 10/21/2020    RBC 3.07 (L) 10/21/2020    HGB 10.1 (L) 10/21/2020    HCT 31.6 (L) 10/21/2020    .9 (H) 10/21/2020    MCH 32.9 (H) 10/21/2020    MCHC 32.0 10/21/2020    RDW 13.7 10/21/2020     10/21/2020    MPV 11.2 (H) 10/21/2020    SEGSPCT 63.3 10/21/2020    EOSRELPCT 1.7 10/21/2020    BASOPCT 0.2 10/21/2020    LYMPHOPCT 24.3 10/21/2020    MONOPCT 10.5 (H) 10/21/2020    SEGSABS 3.7 10/21/2020    EOSABS 0.1 10/21/2020    BASOSABS 0.0 10/21/2020    LYMPHSABS 1.4 10/21/2020    MONOSABS 0.6 10/21/2020    DIFFTYPE AUTOMATED DIFFERENTIAL 10/21/2020     No results found for: ESR    Chemistry:  Lab Results   Component Value Date     09/23/2020    K 4.3 09/23/2020     09/23/2020    CO2 23 09/23/2020    BUN 25 (H) 09/23/2020    CREATININE 1.4 (H) 09/23/2020    GLUCOSE 91 09/23/2020    CALCIUM 9.0 09/23/2020    PROT 6.5 06/22/2020    LABALBU 4.0 06/22/2020    BILITOT 0.4 06/22/2020    ALKPHOS 86 06/22/2020    AST 15 06/22/2020    ALT 10 06/22/2020    LABGLOM 36 (A) 09/23/2020    GFRAA 43 (A) 09/23/2020    AGRATIO 1.6 06/22/2020    GLOB 2.5 06/22/2020     Lab Results   Component Value Date     10/03/2018     No components found for: LD  Lab Results   Component Value Date    TSHHS 1.570 07/17/2020    T4FREE 1.30 07/17/2020       Immunology:  Lab Results   Component Value Date    PROT 6.5 06/22/2020     No results found for: Myrna Bullocks, KLFLCR  No results found for: B2M    Coagulation Panel:  No results found for: PROTIME, INR, APTT, DDIMER    Anemia Panel:  Lab Results   Component Value Date QNEMDEQB16 491.8 07/17/2020    FOLATE 11.1 07/17/2020       Tumor Markers:  No results found for: , CEA, , LABCA2, PSA    Imaging:      Pathology:      Observations:  Performance Status: 1  Symptoms, but ambulatory. Restricted in physically strenuous activity, but ambulatory and able to carry out work of a light or sedentary nature   Depression Status: PHQ-9 Total Score: 7 (10/21/2020 10:54 AM)  Thoughts that you would be better off dead, or of hurting yourself in some way: 1 (10/21/2020 10:54 AM)    Cognitive Status: Mentally vague, Memory impairment and Normal concentration     Assessment & Plan:     1. She has macrocytic anemia and thrombocytopenia.,  B12 and folate level 2018 were within normal limits. Acute viral hepatitis serology was negative. Ultrasound of liver and spleen was negative for hepatosplenomegaly. Bone marrow study in October 2018 suggestive of low risk myelodysplasia. Labs in May 2019  were reviewed. CBC was stable. CBC in November 2019 was reviewed. CBC in March 2020 was stable. July and September 2020 labs reviewed with patient. We will obtain CBC today. CBC 10/21/2020 revealed WBC 5.8, Hgb 10.1, .9, Platelets 591.    2. She was diagnosed with Parkinson disease. Due to worsening hallucinations she is asked to call her neurologist.    3. Constipation: reports ten days or more without BM routinely despite using miralax. She is asked to add a colace daily, use enema if no BM x 3 days. Discussed using prunes if she is not adding colace. 4. Depression- refused medication or counseling; denies suicidal intent    Return to clinic in 3 months or sooner. All of her question has been answered for today. Recent imaging and labs were reviewed and discussed with the patient.       Electronically signed by COURTNEY Link CNP on 10/21/20 at 5:37 PM EDT

## 2020-10-21 ENCOUNTER — HOSPITAL ENCOUNTER (OUTPATIENT)
Dept: INFUSION THERAPY | Age: 85
Discharge: HOME | End: 2020-10-21
Payer: MEDICARE

## 2020-10-21 ENCOUNTER — OFFICE VISIT (OUTPATIENT)
Dept: ONCOLOGY | Age: 85
End: 2020-10-21
Payer: MEDICARE

## 2020-10-21 VITALS
HEART RATE: 70 BPM | SYSTOLIC BLOOD PRESSURE: 124 MMHG | WEIGHT: 188.4 LBS | RESPIRATION RATE: 16 BRPM | OXYGEN SATURATION: 98 % | DIASTOLIC BLOOD PRESSURE: 64 MMHG | HEIGHT: 66 IN | TEMPERATURE: 97.7 F | BODY MASS INDEX: 30.28 KG/M2

## 2020-10-21 DIAGNOSIS — D69.6 THROMBOCYTOPENIA (HCC): ICD-10-CM

## 2020-10-21 LAB
BASOPHILS ABSOLUTE: 0 K/CU MM
BASOPHILS RELATIVE PERCENT: 0.2 % (ref 0–1)
DIFFERENTIAL TYPE: ABNORMAL
EOSINOPHILS ABSOLUTE: 0.1 K/CU MM
EOSINOPHILS RELATIVE PERCENT: 1.7 % (ref 0–3)
HCT VFR BLD CALC: 31.6 % (ref 37–47)
HEMOGLOBIN: 10.1 GM/DL (ref 12.5–16)
LYMPHOCYTES ABSOLUTE: 1.4 K/CU MM
LYMPHOCYTES RELATIVE PERCENT: 24.3 % (ref 24–44)
MCH RBC QN AUTO: 32.9 PG (ref 27–31)
MCHC RBC AUTO-ENTMCNC: 32 % (ref 32–36)
MCV RBC AUTO: 102.9 FL (ref 78–100)
MONOCYTES ABSOLUTE: 0.6 K/CU MM
MONOCYTES RELATIVE PERCENT: 10.5 % (ref 0–4)
PDW BLD-RTO: 13.7 % (ref 11.7–14.9)
PLATELET # BLD: 145 K/CU MM (ref 140–440)
PMV BLD AUTO: 11.2 FL (ref 7.5–11.1)
RBC # BLD: 3.07 M/CU MM (ref 4.2–5.4)
SEGMENTED NEUTROPHILS ABSOLUTE COUNT: 3.7 K/CU MM
SEGMENTED NEUTROPHILS RELATIVE PERCENT: 63.3 % (ref 36–66)
WBC # BLD: 5.8 K/CU MM (ref 4–10.5)

## 2020-10-21 PROCEDURE — G8399 PT W/DXA RESULTS DOCUMENT: HCPCS | Performed by: NURSE PRACTITIONER

## 2020-10-21 PROCEDURE — G0444 DEPRESSION SCREEN ANNUAL: HCPCS | Performed by: NURSE PRACTITIONER

## 2020-10-21 PROCEDURE — G8417 CALC BMI ABV UP PARAM F/U: HCPCS | Performed by: NURSE PRACTITIONER

## 2020-10-21 PROCEDURE — 1036F TOBACCO NON-USER: CPT | Performed by: NURSE PRACTITIONER

## 2020-10-21 PROCEDURE — 99214 OFFICE O/P EST MOD 30 MIN: CPT | Performed by: NURSE PRACTITIONER

## 2020-10-21 PROCEDURE — 4040F PNEUMOC VAC/ADMIN/RCVD: CPT | Performed by: NURSE PRACTITIONER

## 2020-10-21 PROCEDURE — G8427 DOCREV CUR MEDS BY ELIG CLIN: HCPCS | Performed by: NURSE PRACTITIONER

## 2020-10-21 PROCEDURE — G8484 FLU IMMUNIZE NO ADMIN: HCPCS | Performed by: NURSE PRACTITIONER

## 2020-10-21 PROCEDURE — 1123F ACP DISCUSS/DSCN MKR DOCD: CPT | Performed by: NURSE PRACTITIONER

## 2020-10-21 PROCEDURE — 1090F PRES/ABSN URINE INCON ASSESS: CPT | Performed by: NURSE PRACTITIONER

## 2020-10-21 PROCEDURE — 99211 OFF/OP EST MAY X REQ PHY/QHP: CPT

## 2020-10-21 PROCEDURE — 36415 COLL VENOUS BLD VENIPUNCTURE: CPT

## 2020-10-21 PROCEDURE — 85025 COMPLETE CBC W/AUTO DIFF WBC: CPT

## 2020-10-21 ASSESSMENT — PATIENT HEALTH QUESTIONNAIRE - PHQ9
SUM OF ALL RESPONSES TO PHQ QUESTIONS 1-9: 7
3. TROUBLE FALLING OR STAYING ASLEEP: 0
9. THOUGHTS THAT YOU WOULD BE BETTER OFF DEAD, OR OF HURTING YOURSELF: 1
2. FEELING DOWN, DEPRESSED OR HOPELESS: 2
SUM OF ALL RESPONSES TO PHQ9 QUESTIONS 1 & 2: 4
10. IF YOU CHECKED OFF ANY PROBLEMS, HOW DIFFICULT HAVE THESE PROBLEMS MADE IT FOR YOU TO DO YOUR WORK, TAKE CARE OF THINGS AT HOME, OR GET ALONG WITH OTHER PEOPLE: 0
1. LITTLE INTEREST OR PLEASURE IN DOING THINGS: 2
4. FEELING TIRED OR HAVING LITTLE ENERGY: 1
6. FEELING BAD ABOUT YOURSELF - OR THAT YOU ARE A FAILURE OR HAVE LET YOURSELF OR YOUR FAMILY DOWN: 1
SUM OF ALL RESPONSES TO PHQ QUESTIONS 1-9: 7
7. TROUBLE CONCENTRATING ON THINGS, SUCH AS READING THE NEWSPAPER OR WATCHING TELEVISION: 0
SUM OF ALL RESPONSES TO PHQ QUESTIONS 1-9: 6
8. MOVING OR SPEAKING SO SLOWLY THAT OTHER PEOPLE COULD HAVE NOTICED. OR THE OPPOSITE, BEING SO FIGETY OR RESTLESS THAT YOU HAVE BEEN MOVING AROUND A LOT MORE THAN USUAL: 0
5. POOR APPETITE OR OVEREATING: 0

## 2020-10-21 NOTE — PROGRESS NOTES
MA Rooming Questions  Patient: Matt Weller  MRN: [id number]    Date: 10/21/2020        1. Do you have any new issues?   no         2. Do you need any refills on medications?    no    3. Have you had any imaging done since your last visit?   no    4. Have you been hospitalized or seen in the emergency room since your last visit here?   no    5. Did the patient have a depression screening completed today?  Yes    PHQ-9 Total Score: 7 (10/21/2020 10:54 AM)  Thoughts that you would be better off dead, or of hurting yourself in some way: 1 (10/21/2020 10:54 AM)       PHQ-9 Given to (if applicable):               PHQ-9 Score (if applicable):                     [] Positive     []  Negative              Does question #9 need addressed (if applicable)                     [] Yes    []  No               Severa Both, MA

## 2020-12-15 ENCOUNTER — HOSPITAL ENCOUNTER (OUTPATIENT)
Age: 85
Discharge: HOME | End: 2020-12-15
Payer: MEDICARE

## 2020-12-15 LAB
ANION GAP SERPL CALCULATED.3IONS-SCNC: 11 MMOL/L (ref 4–16)
BUN BLDV-MCNC: 27 MG/DL (ref 6–23)
CALCIUM SERPL-MCNC: 9 MG/DL (ref 8.3–10.6)
CHLORIDE BLD-SCNC: 107 MMOL/L (ref 99–110)
CO2: 25 MMOL/L (ref 21–32)
CREAT SERPL-MCNC: 1.5 MG/DL (ref 0.6–1.1)
GFR AFRICAN AMERICAN: 40 ML/MIN/1.73M2
GFR NON-AFRICAN AMERICAN: 33 ML/MIN/1.73M2
GLUCOSE FASTING: 115 MG/DL (ref 70–99)
POTASSIUM SERPL-SCNC: 4.6 MMOL/L (ref 3.5–5.1)
SODIUM BLD-SCNC: 143 MMOL/L (ref 135–145)

## 2020-12-15 PROCEDURE — 80048 BASIC METABOLIC PNL TOTAL CA: CPT

## 2020-12-15 PROCEDURE — 36415 COLL VENOUS BLD VENIPUNCTURE: CPT

## 2021-01-05 ENCOUNTER — OFFICE VISIT (OUTPATIENT)
Dept: INTERNAL MEDICINE CLINIC | Age: 86
End: 2021-01-05
Payer: MEDICARE

## 2021-01-05 VITALS
BODY MASS INDEX: 29.5 KG/M2 | DIASTOLIC BLOOD PRESSURE: 72 MMHG | SYSTOLIC BLOOD PRESSURE: 128 MMHG | WEIGHT: 182.8 LBS | HEART RATE: 68 BPM | OXYGEN SATURATION: 97 % | RESPIRATION RATE: 16 BRPM | TEMPERATURE: 98 F

## 2021-01-05 DIAGNOSIS — N28.9 RENAL INSUFFICIENCY: ICD-10-CM

## 2021-01-05 DIAGNOSIS — F32.9 MAJOR DEPRESSIVE DISORDER WITH SINGLE EPISODE, REMISSION STATUS UNSPECIFIED: ICD-10-CM

## 2021-01-05 DIAGNOSIS — G20 PARKINSONISM, UNSPECIFIED PARKINSONISM TYPE (HCC): ICD-10-CM

## 2021-01-05 DIAGNOSIS — D69.6 THROMBOCYTOPENIA (HCC): ICD-10-CM

## 2021-01-05 DIAGNOSIS — I10 ESSENTIAL HYPERTENSION: ICD-10-CM

## 2021-01-05 DIAGNOSIS — R85.618 OTHER ABNORMAL CYTOLOGICAL FINDINGS ON SPECIMENS FROM ANUS: Primary | ICD-10-CM

## 2021-01-05 DIAGNOSIS — R63.4 WEIGHT LOSS: ICD-10-CM

## 2021-01-05 DIAGNOSIS — H40.9 GLAUCOMA OF BOTH EYES, UNSPECIFIED GLAUCOMA TYPE: ICD-10-CM

## 2021-01-05 DIAGNOSIS — M54.42 BILATERAL LOW BACK PAIN WITH LEFT-SIDED SCIATICA, UNSPECIFIED CHRONICITY: ICD-10-CM

## 2021-01-05 DIAGNOSIS — E78.2 MIXED HYPERLIPIDEMIA: ICD-10-CM

## 2021-01-05 DIAGNOSIS — R44.3 HALLUCINATION: ICD-10-CM

## 2021-01-05 DIAGNOSIS — F03.91 DEMENTIA WITH BEHAVIORAL DISTURBANCE, UNSPECIFIED DEMENTIA TYPE: ICD-10-CM

## 2021-01-05 PROBLEM — F03.918 DEMENTIA WITH BEHAVIORAL DISTURBANCE (HCC): Status: ACTIVE | Noted: 2021-01-05

## 2021-01-05 PROCEDURE — 1090F PRES/ABSN URINE INCON ASSESS: CPT | Performed by: INTERNAL MEDICINE

## 2021-01-05 PROCEDURE — 99214 OFFICE O/P EST MOD 30 MIN: CPT | Performed by: INTERNAL MEDICINE

## 2021-01-05 PROCEDURE — G8427 DOCREV CUR MEDS BY ELIG CLIN: HCPCS | Performed by: INTERNAL MEDICINE

## 2021-01-05 PROCEDURE — G8484 FLU IMMUNIZE NO ADMIN: HCPCS | Performed by: INTERNAL MEDICINE

## 2021-01-05 PROCEDURE — 4040F PNEUMOC VAC/ADMIN/RCVD: CPT | Performed by: INTERNAL MEDICINE

## 2021-01-05 PROCEDURE — G8399 PT W/DXA RESULTS DOCUMENT: HCPCS | Performed by: INTERNAL MEDICINE

## 2021-01-05 PROCEDURE — 1123F ACP DISCUSS/DSCN MKR DOCD: CPT | Performed by: INTERNAL MEDICINE

## 2021-01-05 PROCEDURE — 1036F TOBACCO NON-USER: CPT | Performed by: INTERNAL MEDICINE

## 2021-01-05 PROCEDURE — 93000 ELECTROCARDIOGRAM COMPLETE: CPT | Performed by: INTERNAL MEDICINE

## 2021-01-05 PROCEDURE — G8417 CALC BMI ABV UP PARAM F/U: HCPCS | Performed by: INTERNAL MEDICINE

## 2021-01-05 RX ORDER — LOSARTAN POTASSIUM 100 MG/1
TABLET ORAL
Qty: 90 TABLET | Refills: 1 | Status: SHIPPED | OUTPATIENT
Start: 2021-01-05 | End: 2021-04-19

## 2021-01-05 RX ORDER — CLOTRIMAZOLE AND BETAMETHASONE DIPROPIONATE 10; .64 MG/G; MG/G
CREAM TOPICAL
Qty: 45 G | Refills: 1 | Status: SHIPPED | OUTPATIENT
Start: 2021-01-05 | End: 2021-10-18 | Stop reason: ALTCHOICE

## 2021-01-05 RX ORDER — ATORVASTATIN CALCIUM 10 MG/1
TABLET, FILM COATED ORAL
Qty: 90 TABLET | Refills: 1 | Status: SHIPPED | OUTPATIENT
Start: 2021-01-05 | End: 2021-04-19

## 2021-01-05 RX ORDER — AMLODIPINE BESYLATE 10 MG/1
TABLET ORAL
Qty: 90 TABLET | Refills: 1 | Status: SHIPPED | OUTPATIENT
Start: 2021-01-05 | End: 2021-04-19

## 2021-01-05 NOTE — PROGRESS NOTES
ABDOMEN:  Soft and non-tender,no masses  ororganomegaly. EXTREMITIES:  No cyanosis, clubbing, or significant edema. Trace pedal edema  SKIN: Skin is warm and dry. NEUROLOGICAL:  Cranial nerves II through XII are grossly intact. consused and disoriented     IMPRESSION:    Encounter Diagnoses   Name Primary?  Other abnormal cytological findings on specimens from anus Yes    Essential hypertension     Parkinsonism, unspecified Parkinsonism type ([address]     Mixed hyperlipidemia     Hallucination     Glaucoma of both eyes, unspecified glaucoma type     Dementia with behavioral disturbance, unspecified dementia type ([address]     Major depressive disorder with single episode, remission status unspecified     Renal insufficiency     Thrombocytopenia (HCC)     Weight loss     Bilateral low back pain with left-sided sciatica, unspecified chronicity        ASSESSMENT/PLAN:    . Dementia : stable. . Hallucination : on seroquel  . HTN : on medication : losartan and amlodipine  . HLD : atorvastatin 10 mg daily  . Glaucoma : on eye drops  . MDS : has appointment next week. . Wt loss : does not work up  . Parkinsonism : on sinemet  . Constipation : miralax. . Thrombocytopenia : has appointment with Dr Duglas Majano. .  Advised patient to use compression stockings  . Intertrigo under breasts will give Lotrisone cream  .  Denies any back pain  . Lotrisone cream under the breast bilaterally    Orders Placed This Encounter   Procedures    Comprehensive Metabolic Panel    Lipid, Fasting    EKG 12 Lead     Return to office in 3 months. Mediations reviewed with the patient. Continue current medications. Appropriate prescriptions are addressed. After visit summeryprovided. Follow up as directed sooner if needed. Questions answered and patient verbalizes understanding. Call for any problems, questions, or concerns.        No Known Allergies  Current Outpatient Medications   Medication Sig Dispense Refill  carbidopa-levodopa (SINEMET)  MG per tablet TAKE 1 (ONE) TABLET BY MOUTH 3 (THREE) TIMES A DAY .  polyethylene glycol ([employer] PURELAX) 17 GM/SCOOP powder Take 17 g by mouth daily      amLODIPine (NORVASC) 10 MG tablet TAKE 1 TABLET DAILY 90 tablet 1    losartan (COZAAR) 100 MG tablet TAKE 1 TABLET DAILY        DISCONTINUE LISINOPRIL 90 tablet 1    atorvastatin (LIPITOR) 10 MG tablet TAKE 1 TABLET DAILY 90 tablet 1    tolterodine (DETROL LA) 4 MG extended release capsule Take 4 mg by mouth daily Dr. Kenia Gipson      QUEtiapine (SEROQUEL) 50 MG tablet Take 50 mg by mouth nightly      aspirin 81 MG tablet Take 81 mg by mouth daily.  latanoprost (XALATAN) 0.005 % ophthalmic solution 1 drop nightly. No current facility-administered medications for this visit. Past Medical History:   Diagnosis Date    Colonoscopy refused     Glaucoma     Dr. Munoz Cancer Hyperlipidemia     Hypertension     Major depressive disorder with single episode 9/13/2018    Patient has depression and agitation. Was started on the Lexapro.  Myelodysplastic syndrome ([address] 10/22/2018    Dx 10/2018 : Myelodysplastic syndrome with multilineage dysplasia Dr Mynor Yanes    Obesity     Status post total left knee replacement 4/27/2017    Pt had  Left TKA on 3/2017 in [place] Dr Renuka Jason. Antibiotic prophylaxis recommended by ortho when needed.     Thrombocytopenia ([address] 9/23/2018    Patient is referred to hematologist     Past Surgical History:   Procedure Laterality Date    CATARACT REMOVAL Bilateral     CHOLECYSTECTOMY      HYSTERECTOMY Bilateral 1981    KNEE ARTHROSCOPY Left 2007    TOTAL KNEE ARTHROPLASTY Left 03/10/2017     Social History     Tobacco Use    Smoking status: Never Smoker    Smokeless tobacco: Never Used   Substance Use Topics    Alcohol use: No     Alcohol/week: 0.0 standard drinks       LAB REVIEW:  CBC:   Lab Results   Component Value Date    WBC 5.8 10/21/2020    HGB 10.1 10/21/2020 HCT 31.6 10/21/2020     10/21/2020     Lipids:   Lab Results   Component Value Date    CHOL 198 04/22/2017    TRIG 93 04/22/2017    HDL 83 (H) 06/22/2020    LDLCALC 96 06/22/2020    LDLDIRECT 92 07/02/2018    TRIGLYCFAST 64 06/22/2020     Renal:   Lab Results   Component Value Date    BUN 27 12/15/2020    CREATININE 1.5 12/15/2020     12/15/2020    K 4.6 12/15/2020    ALT 10 06/22/2020    AST 15 06/22/2020    GLUCOSE 91 09/23/2020     PT/INR: No results found for: INR  A1C: No results found for: Brad Garrett MD, 1/5/2021 , 2:23 PM

## 2021-01-06 ENCOUNTER — TELEPHONE (OUTPATIENT)
Dept: INTERNAL MEDICINE CLINIC | Age: 86
End: 2021-01-06

## 2021-01-19 NOTE — PROGRESS NOTES
Patient Name: Paula Reis  Patient : 1935  Patient MRN: [id number]     Primary Oncologist: Junior Ruchi MD  Referring Provider: Carmelo Jones MD       Date of Service: 2021      Chief Complaint:   Chief Complaint   Patient presents with    3 Month Follow-Up     She came in for follow-up visit. Problem List:      Essential hypertension     Mixed hyperlipidemia     Obesity     Glaucoma of both eyes     Major depressive disorder with single episode     Weight loss     Thrombocytopenia (HCC)     Bilateral low back pain with left-sided sciatica     Myelodysplastic syndrome (HCC)     Renal insufficiency     Hallucination     Parkinsonism (HCC)     Cognitive impairment     Other megaloblastic anemias, not elsewhere classified     Other fatigue     Disorder of the skin and subcutaneous tissue, unspecified        HPI:   Paula Reis is a pleasant 53-year-old  female patient was referred for evaluation of macrocytic anemia and thrombocytopenia. In 2011 WBC was 6.6, hemoglobin 13.4, MCV 98.8, platelet 824. 2011 WBC was 6.7 hemoglobin 13.4, MCV 99. platelet 925. 2018 WBC was 7.6, hemoglobin 11.9, hematocrit 37.8, .7, platelets 62. In 2018 WBC was 7.8, hemoglobin 12.3, 104.9, platelet 98. B-12 was  387.5, folate was 14.4. CMP in 2018 was unremarkable except for creatinine 1.1. TSH in 2018 was 2.05. Venous Doppler study of the left lower extremity in 2017 was negative for DVT. We discussed the potential etiologies of macrocytic anemia and thrombocytopenia. .  She denies any alcohol. I order ultrasound of the liver and spleen. Mammogram was in 2017. She refuses colonoscopy. Bone marrow study on 2018 revealed cellular marrow with 3 lineage hematopoiesis with dysplasia in all 3 lines. Increased marrow iron stores, ring sideroblasts were not identified.   This finding appears classified as myelodysplastic syndrome with with multilineage dysplasia. FISH for MDS was negative. Cytogenetic study revealed normal female karyotype. Flow cytometry was negative for blast.  She likely has low risk myelodysplasia. We discussed about potential risk of acute leukemia. She is agreeable to continue with active surveillance. LDH was within normal limits. Acute viral hepatitis serology was negative. Ultrasound of the abdomen revealed incidental simple right kidney cyst.    Labs in May 2019 were reviewed. CBC was stable. Labs in November 2019 were reviewed. Labs in March 2020 were reviewed and stable. Platelet has been fluctuating. No need for platelet transfusion for now. She was seen by neurologist and diagnosed with Parkinson disease. She is placed on tolterodine and quetiapine. She is on baby aspirin. She gets bruises easily like related to aspirin and thin skin. January 21, 2021 she came in for follow-up visit. In July 2020 anemic work-up was grossly unremarkable. Likely she has anemia of chronic disease. She continues to have easy bruising likely secondary to ASA and other medications. BMP in December 2020 was stable for her. She is agreeable to have CBC today. She is scheduled for EKG before the next appointment to see neurologist.  She had flu shot in 2020. He had dogs with slight to jump on her. This could also cause her to get bruised. She denied any acute pain. She denied any fever or chills. No nausea, vomiting or diarrhea. No dysuria or hematuria. I will consider repeat bone marrow study if CBC is worse. Past Medical History  Osteoarthritis, depression/anxiety, glaucoma, dyslipidemia, hypertension, obesity, anemia and thrombocytopenia. Surgical History     Cataract surgery in 2009, cholecystectomy in early 90, right breast biopsy in 1989, arthroscopy Knee surgery in 2008 and left knee replacement in March 2017. Complete hysterectomy in 1981.     Social History Smoking Status Never smoker  She denied any alcohol or illicit drug use. She has 5 children. Family History     Mother had tumor of stomach. Review of Systems: \"Per interval history; otherwise 10 point ROS is negative. \"     Vital Signs:  /80 (Site: Right Upper Arm, Position: Sitting, Cuff Size: Large Adult)   Pulse 70   Temp 96.8 °F (36 °C) (Infrared)   Resp 16   Ht 5' 8\" (1.727 m)   Wt 177 lb 3.2 oz (80.4 kg)   SpO2 96%   BMI 26.94 kg/m²     Physical Exam:    CONSTITUTIONAL: awake, alert, cooperative, no apparent distress +walker  EYES: sclera clear and conjunctiva normal  ENT: Normocephalic, without obvious abnormality, atraumatic [place]  NECK: supple, symmetrical  HEMATOLOGIC/LYMPHATIC: no cervical, supraclavicular  lymphadenopathy   LUNGS: no increased work of breathing and clear to auscultation   CARDIOVASCULAR: regular rate and rhythm, normal S1 and S2, no murmur noted  ABDOMEN: normal bowel sounds x 4, soft, non-distended, non-tender  MUSCULOSKELETAL: full range of motion noted, tone is normal  NEUROLOGIC: awake, alert. Cranial nerves II through XII grossly intact. SKIN: No jaundice. Few bruises noted to upper extremities. She has thin skin. No petechial rash. EXTREMITIES: no LE edema. She has arthritic changes to the distal joint of the fingers.      Labs:    Hematology:  Lab Results   Component Value Date    WBC 5.8 10/21/2020    RBC 3.07 (L) 10/21/2020    HGB 10.1 (L) 10/21/2020    HCT 31.6 (L) 10/21/2020    .9 (H) 10/21/2020    MCH 32.9 (H) 10/21/2020    MCHC 32.0 10/21/2020    RDW 13.7 10/21/2020     10/21/2020    MPV 11.2 (H) 10/21/2020    SEGSPCT 63.3 10/21/2020    EOSRELPCT 1.7 10/21/2020    BASOPCT 0.2 10/21/2020    LYMPHOPCT 24.3 10/21/2020    MONOPCT 10.5 (H) 10/21/2020    SEGSABS 3.7 10/21/2020    EOSABS 0.1 10/21/2020    BASOSABS 0.0 10/21/2020    LYMPHSABS 1.4 10/21/2020    MONOSABS 0.6 10/21/2020    DIFFTYPE AUTOMATED DIFFERENTIAL 10/21/2020

## 2021-01-21 ENCOUNTER — HOSPITAL ENCOUNTER (OUTPATIENT)
Dept: INFUSION THERAPY | Age: 86
Discharge: HOME | End: 2021-01-21
Payer: MEDICARE

## 2021-01-21 ENCOUNTER — OFFICE VISIT (OUTPATIENT)
Dept: ONCOLOGY | Age: 86
End: 2021-01-21
Payer: MEDICARE

## 2021-01-21 VITALS
DIASTOLIC BLOOD PRESSURE: 80 MMHG | RESPIRATION RATE: 16 BRPM | SYSTOLIC BLOOD PRESSURE: 129 MMHG | OXYGEN SATURATION: 96 % | BODY MASS INDEX: 26.86 KG/M2 | HEIGHT: 68 IN | TEMPERATURE: 96.8 F | HEART RATE: 70 BPM | WEIGHT: 177.2 LBS

## 2021-01-21 DIAGNOSIS — D64.9 ANEMIA, UNSPECIFIED TYPE: ICD-10-CM

## 2021-01-21 DIAGNOSIS — D64.9 ANEMIA, UNSPECIFIED TYPE: Primary | ICD-10-CM

## 2021-01-21 PROCEDURE — 1036F TOBACCO NON-USER: CPT | Performed by: INTERNAL MEDICINE

## 2021-01-21 PROCEDURE — G8399 PT W/DXA RESULTS DOCUMENT: HCPCS | Performed by: INTERNAL MEDICINE

## 2021-01-21 PROCEDURE — 85025 COMPLETE CBC W/AUTO DIFF WBC: CPT

## 2021-01-21 PROCEDURE — G8427 DOCREV CUR MEDS BY ELIG CLIN: HCPCS | Performed by: INTERNAL MEDICINE

## 2021-01-21 PROCEDURE — 99211 OFF/OP EST MAY X REQ PHY/QHP: CPT

## 2021-01-21 PROCEDURE — 99213 OFFICE O/P EST LOW 20 MIN: CPT | Performed by: INTERNAL MEDICINE

## 2021-01-21 PROCEDURE — 1123F ACP DISCUSS/DSCN MKR DOCD: CPT | Performed by: INTERNAL MEDICINE

## 2021-01-21 PROCEDURE — 1090F PRES/ABSN URINE INCON ASSESS: CPT | Performed by: INTERNAL MEDICINE

## 2021-01-21 PROCEDURE — G8417 CALC BMI ABV UP PARAM F/U: HCPCS | Performed by: INTERNAL MEDICINE

## 2021-01-21 PROCEDURE — 36415 COLL VENOUS BLD VENIPUNCTURE: CPT

## 2021-01-21 PROCEDURE — 4040F PNEUMOC VAC/ADMIN/RCVD: CPT | Performed by: INTERNAL MEDICINE

## 2021-01-21 PROCEDURE — G8484 FLU IMMUNIZE NO ADMIN: HCPCS | Performed by: INTERNAL MEDICINE

## 2021-01-21 NOTE — PROGRESS NOTES
MA Rooming Questions  Patient: Fernanda Mackey  MRN: [id number]    Date: 1/21/2021        1. Do you have any new issues?   no         2. Do you need any refills on medications?    no    3. Have you had any imaging done since your last visit? yes - EKG    4. Have you been hospitalized or seen in the emergency room since your last visit here?   no    5. Did the patient have a depression screening completed today?  No    No data recorded     PHQ-9 Given to (if applicable):               PHQ-9 Score (if applicable):                     [] Positive     []  Negative              Does question #9 need addressed (if applicable)                     [] Yes    []  No               Casper Bolanos MA

## 2021-01-22 LAB
BASOPHILS ABSOLUTE: 0 K/CU MM
BASOPHILS RELATIVE PERCENT: 0.2 % (ref 0–1)
DIFFERENTIAL TYPE: ABNORMAL
EOSINOPHILS ABSOLUTE: 0.1 K/CU MM
EOSINOPHILS RELATIVE PERCENT: 1.3 % (ref 0–3)
HCT VFR BLD CALC: 34.5 % (ref 37–47)
HEMOGLOBIN: 10.8 GM/DL (ref 12.5–16)
LYMPHOCYTES ABSOLUTE: 1.8 K/CU MM
LYMPHOCYTES RELATIVE PERCENT: 28.8 % (ref 24–44)
MCH RBC QN AUTO: 32.2 PG (ref 27–31)
MCHC RBC AUTO-ENTMCNC: 31.3 % (ref 32–36)
MCV RBC AUTO: 103 FL (ref 78–100)
MONOCYTES ABSOLUTE: 0.6 K/CU MM
MONOCYTES RELATIVE PERCENT: 10.2 % (ref 0–4)
PDW BLD-RTO: 13.8 % (ref 11.7–14.9)
PLATELET # BLD: 59 K/CU MM (ref 140–440)
PMV BLD AUTO: 14 FL (ref 7.5–11.1)
RBC # BLD: 3.35 M/CU MM (ref 4.2–5.4)
SEGMENTED NEUTROPHILS ABSOLUTE COUNT: 3.7 K/CU MM
SEGMENTED NEUTROPHILS RELATIVE PERCENT: 59.5 % (ref 36–66)
WBC # BLD: 6.3 K/CU MM (ref 4–10.5)

## 2021-03-29 ENCOUNTER — HOSPITAL ENCOUNTER (OUTPATIENT)
Age: 86
Discharge: HOME | End: 2021-03-29
Payer: MEDICARE

## 2021-03-29 LAB
ALBUMIN SERPL-MCNC: 3.5 GM/DL (ref 3.4–5)
ALP BLD-CCNC: 93 IU/L (ref 40–129)
ALT SERPL-CCNC: 5 U/L (ref 10–40)
ANION GAP SERPL CALCULATED.3IONS-SCNC: 6 MMOL/L (ref 4–16)
AST SERPL-CCNC: 10 IU/L (ref 15–37)
BILIRUB SERPL-MCNC: 0.4 MG/DL (ref 0–1)
BUN BLDV-MCNC: 35 MG/DL (ref 6–23)
CALCIUM SERPL-MCNC: 9.3 MG/DL (ref 8.3–10.6)
CHLORIDE BLD-SCNC: 104 MMOL/L (ref 99–110)
CHOLESTEROL, FASTING: 155 MG/DL
CO2: 29 MMOL/L (ref 21–32)
CREAT SERPL-MCNC: 1.8 MG/DL (ref 0.6–1.1)
GFR AFRICAN AMERICAN: 33 ML/MIN/1.73M2
GFR NON-AFRICAN AMERICAN: 27 ML/MIN/1.73M2
GLUCOSE FASTING: 101 MG/DL (ref 70–99)
HDLC SERPL-MCNC: 61 MG/DL
LDL CHOLESTEROL DIRECT: 82 MG/DL
POTASSIUM SERPL-SCNC: 4.5 MMOL/L (ref 3.5–5.1)
SODIUM BLD-SCNC: 139 MMOL/L (ref 135–145)
TOTAL PROTEIN: 6.1 GM/DL (ref 6.4–8.2)
TRIGLYCERIDE, FASTING: 66 MG/DL

## 2021-03-29 PROCEDURE — 36415 COLL VENOUS BLD VENIPUNCTURE: CPT

## 2021-03-29 PROCEDURE — 80053 COMPREHEN METABOLIC PANEL: CPT

## 2021-03-29 PROCEDURE — 80061 LIPID PANEL: CPT

## 2021-03-30 ENCOUNTER — OFFICE VISIT (OUTPATIENT)
Dept: INTERNAL MEDICINE CLINIC | Age: 86
End: 2021-03-30
Payer: MEDICARE

## 2021-03-30 ENCOUNTER — TELEPHONE (OUTPATIENT)
Dept: INTERNAL MEDICINE CLINIC | Age: 86
End: 2021-03-30

## 2021-03-30 VITALS
TEMPERATURE: 98.8 F | DIASTOLIC BLOOD PRESSURE: 70 MMHG | OXYGEN SATURATION: 97 % | HEART RATE: 70 BPM | SYSTOLIC BLOOD PRESSURE: 114 MMHG

## 2021-03-30 DIAGNOSIS — K64.0 GRADE I HEMORRHOIDS: ICD-10-CM

## 2021-03-30 DIAGNOSIS — K59.09 CHRONIC CONSTIPATION: Primary | ICD-10-CM

## 2021-03-30 PROCEDURE — G8417 CALC BMI ABV UP PARAM F/U: HCPCS | Performed by: NURSE PRACTITIONER

## 2021-03-30 PROCEDURE — G8399 PT W/DXA RESULTS DOCUMENT: HCPCS | Performed by: NURSE PRACTITIONER

## 2021-03-30 PROCEDURE — 4040F PNEUMOC VAC/ADMIN/RCVD: CPT | Performed by: NURSE PRACTITIONER

## 2021-03-30 PROCEDURE — 1123F ACP DISCUSS/DSCN MKR DOCD: CPT | Performed by: NURSE PRACTITIONER

## 2021-03-30 PROCEDURE — G8427 DOCREV CUR MEDS BY ELIG CLIN: HCPCS | Performed by: NURSE PRACTITIONER

## 2021-03-30 PROCEDURE — 99213 OFFICE O/P EST LOW 20 MIN: CPT | Performed by: NURSE PRACTITIONER

## 2021-03-30 PROCEDURE — 1090F PRES/ABSN URINE INCON ASSESS: CPT | Performed by: NURSE PRACTITIONER

## 2021-03-30 PROCEDURE — 1036F TOBACCO NON-USER: CPT | Performed by: NURSE PRACTITIONER

## 2021-03-30 PROCEDURE — G8484 FLU IMMUNIZE NO ADMIN: HCPCS | Performed by: NURSE PRACTITIONER

## 2021-03-30 RX ORDER — LACTULOSE 10 G/15ML
10 SOLUTION ORAL DAILY
Qty: 1 BOTTLE | Refills: 5 | Status: ON HOLD | OUTPATIENT
Start: 2021-03-30 | End: 2021-07-20 | Stop reason: HOSPADM

## 2021-03-30 SDOH — ECONOMIC STABILITY: TRANSPORTATION INSECURITY
IN THE PAST 12 MONTHS, HAS LACK OF TRANSPORTATION KEPT YOU FROM MEETINGS, WORK, OR FROM GETTING THINGS NEEDED FOR DAILY LIVING?: NO

## 2021-03-30 SDOH — ECONOMIC STABILITY: FOOD INSECURITY: WITHIN THE PAST 12 MONTHS, THE FOOD YOU BOUGHT JUST DIDN'T LAST AND YOU DIDN'T HAVE MONEY TO GET MORE.: NEVER TRUE

## 2021-03-30 NOTE — TELEPHONE ENCOUNTER
Please inform patient that her cholesterol is stable. Her kidney function is slightly worse than her baseline. Please ensure that she is drinking plenty of water at least 8 glasses per day, and avoiding NSAIDs such as Ibuprofen, Advil, Aleve, Motrin.

## 2021-03-30 NOTE — PROGRESS NOTES
Subjective:       Mony Alcantara is a 82 y.o. female who presents for evaluation of constipation. Onset was several months ago. Patient has been having rare firm, formed and dark colored stools per month. Defecation has been difficult and painful. Co-Morbid conditions:current medication carbidopa-levadopa which has anti-cholinergic side effects, neurologic disease and neuromuscular disease. Symptoms have gradually worsened. Current Health Habits: Eating fiber? yes - miralax daily, Exercise? no, Adequate hydration? no. Current over the counter/prescription laxative: bulk (psyllium) which has been not very effective. Past Medical History:   Diagnosis Date    Colonoscopy refused     Glaucoma     Dr. Tali Gregory Hyperlipidemia     Hypertension     Major depressive disorder with single episode 9/13/2018    Patient has depression and agitation. Was started on the Lexapro.  Myelodysplastic syndrome ([address] 10/22/2018    Dx 10/2018 : Myelodysplastic syndrome with multilineage dysplasia Dr [facility]    Obesity     Status post total left knee replacement 4/27/2017    Pt had  Left TKA on 3/2017 in [place] Dr Ila Irizarry. Antibiotic prophylaxis recommended by ortho when needed.     Thrombocytopenia ([address] 9/23/2018    Patient is referred to hematologist     Patient Active Problem List    Diagnosis Date Noted    Dementia with behavioral disturbance ([address] 01/05/2021    Other megaloblastic anemias, not elsewhere classified 10/12/2020    Other fatigue 10/12/2020    Hallucination 06/22/2020    Parkinsonism ([address] 06/22/2020    Cognitive impairment 06/22/2020    Renal insufficiency 10/13/2019    Myelodysplastic syndrome ([address] 10/22/2018    Weight loss 09/23/2018    Thrombocytopenia ([address] 09/23/2018    Bilateral low back pain with left-sided sciatica 09/23/2018    Major depressive disorder with single episode 09/13/2018    Glaucoma of both eyes 07/12/2016    Essential hypertension     Mixed presents for evaluation of possible hemorrhoids. Onset of symptoms was gradual. Symptoms have been unchanged since that time. Symptoms include: bleeding which only occurs with bowel movements, constipation and painful defecation. Patient denies family hx of colorectal CA, history of previous STDs and known or suspected STD exposure. Treatment to date has been spouse has been using Vaseline to help with BM. Osmelbeba Saad Past Medical History:   Diagnosis Date    Colonoscopy refused     Glaucoma     Dr. Michaela Wolff Hyperlipidemia     Hypertension     Major depressive disorder with single episode 9/13/2018    Patient has depression and agitation. Was started on the Lexapro.  Myelodysplastic syndrome ([address] 10/22/2018    Dx 10/2018 : Myelodysplastic syndrome with multilineage dysplasia Dr Sanjeev Hardy    Obesity     Status post total left knee replacement 4/27/2017    Pt had  Left TKA on 3/2017 in [place] Dr Maggie Burden. Antibiotic prophylaxis recommended by ortho when needed.     Thrombocytopenia ([address] 9/23/2018    Patient is referred to hematologist     Patient Active Problem List    Diagnosis Date Noted    Dementia with behavioral disturbance ([address] 01/05/2021    Other megaloblastic anemias, not elsewhere classified 10/12/2020    Other fatigue 10/12/2020    Hallucination 06/22/2020    Parkinsonism ([address] 06/22/2020    Cognitive impairment 06/22/2020    Renal insufficiency 10/13/2019    Myelodysplastic syndrome ([address] 10/22/2018    Weight loss 09/23/2018    Thrombocytopenia ([address] 09/23/2018    Bilateral low back pain with left-sided sciatica 09/23/2018    Major depressive disorder with single episode 09/13/2018    Glaucoma of both eyes 07/12/2016    Essential hypertension     Mixed hyperlipidemia     Obesity      Past Surgical History:   Procedure Laterality Date    CATARACT REMOVAL Bilateral     CHOLECYSTECTOMY      HYSTERECTOMY Bilateral 1981    KNEE ARTHROSCOPY Left 2007    TOTAL KNEE ARTHROPLASTY daily, in addition to 2 stool softeners nightly. Will switch medication to try and get better results. Advised that she needs to increase her fluid intake. Sending referral in for GI consult. Augustus Magallanes MA will help pt with making appointment. - lactulose (CHRONULAC) 10 GM/15ML solution; Take 15 mLs by mouth daily  Dispense: 1 Bottle; Refill: 5  - Ambulatory referral to Gastroenterology  - pramoxine-zinc oxide in mineral oil (TUCKS HEMORRHOIDAL) 1-12.5 % OINT ointment; Place rectally 3 times daily as needed (pain relief)  Dispense: 1 Tube; Refill: 5    2. Grade I hemorrhoids  Pt has external hemorrhoids present. Currently not inflamed.  Spouse states that he has not noticed any blood in stool.    - pramoxine-zinc oxide in mineral oil (TUCKS HEMORRHOIDAL) 1-12.5 % OINT ointment; Place rectally 3 times daily as needed (pain relief)  Dispense: 1 Tube; Refill: 5

## 2021-03-30 NOTE — TELEPHONE ENCOUNTER
Spoke with patient's , and informed of results. States difficulty with Bm's. Appt made with Dr. Anthony Kumar.

## 2021-04-19 RX ORDER — LOSARTAN POTASSIUM 100 MG/1
TABLET ORAL
Qty: 90 TABLET | Refills: 1 | Status: ON HOLD | OUTPATIENT
Start: 2021-04-19 | End: 2021-07-20 | Stop reason: HOSPADM

## 2021-04-19 RX ORDER — AMLODIPINE BESYLATE 10 MG/1
TABLET ORAL
Qty: 90 TABLET | Refills: 1 | Status: SHIPPED | OUTPATIENT
Start: 2021-04-19 | End: 2021-08-03 | Stop reason: ALTCHOICE

## 2021-04-19 RX ORDER — ATORVASTATIN CALCIUM 10 MG/1
TABLET, FILM COATED ORAL
Qty: 90 TABLET | Refills: 1 | Status: SHIPPED | OUTPATIENT
Start: 2021-04-19 | End: 2021-08-03 | Stop reason: ALTCHOICE

## 2021-04-23 NOTE — PROGRESS NOTES
Patient Name: Tamela Dancer  Patient : 1935  Patient MRN: [id number]     Primary Oncologist: Carlene Collins MD  Referring Provider: Sabino Coy MD       Date of Service: 2021      Chief Complaint:   Chief Complaint   Patient presents with    Follow-up     She came in for follow-up visit. Problem List:      Essential hypertension     Mixed hyperlipidemia     Obesity     Glaucoma of both eyes     Major depressive disorder with single episode     Weight loss     Thrombocytopenia (HCC)     Bilateral low back pain with left-sided sciatica     Myelodysplastic syndrome (HCC)     Renal insufficiency     Hallucination     Parkinsonism (HCC)     Cognitive impairment     Other megaloblastic anemias, not elsewhere classified     Other fatigue     Disorder of the skin and subcutaneous tissue, unspecified        HPI:   Tamela Dancer is a pleasant 82-year-old  female patient was referred for evaluation of macrocytic anemia and thrombocytopenia. In 2011 WBC was 6.6, hemoglobin 13.4, MCV 98.8, platelet 941. 2011 WBC was 6.7 hemoglobin 13.4, MCV 99. platelet 371. 2018 WBC was 7.6, hemoglobin 11.9, hematocrit 37.8, .7, platelets 62. In 2018 WBC was 7.8, hemoglobin 12.3, 104.9, platelet 98. B-12 was  387.5, folate was 14.4. CMP in 2018 was unremarkable except for creatinine 1.1. TSH in 2018 was 2.05. Venous Doppler study of the left lower extremity in 2017 was negative for DVT. We discussed the potential etiologies of macrocytic anemia and thrombocytopenia. .  She denies any alcohol. I order ultrasound of the liver and spleen. Mammogram was in 2017. She refuses colonoscopy. Bone marrow study on 2018 revealed cellular marrow with 3 lineage hematopoiesis with dysplasia in all 3 lines. Increased marrow iron stores, ring sideroblasts were not identified.   This finding appears classified as myelodysplastic syndrome with with multilineage dysplasia. FISH for MDS was negative. Cytogenetic study revealed normal female karyotype. Flow cytometry was negative for blast.  She likely has low risk myelodysplasia. We discussed about potential risk of acute leukemia. She is agreeable to continue with active surveillance. LDH was within normal limits. Acute viral hepatitis serology was negative. Ultrasound of the abdomen revealed incidental simple right kidney cyst.    Labs in May 2019 were reviewed. CBC was stable. Labs in November 2019 were reviewed. Labs in March 2020 were reviewed and stable. Platelet has been fluctuating. No need for platelet transfusion for now. She was seen by neurologist and diagnosed with Parkinson disease. She is placed on tolterodine and quetiapine. She is on baby aspirin. She gets bruises easily like related to aspirin and thin skin. In July 2020 anemic work-up was grossly unremarkable. Likely she has anemia of chronic disease. She continues to have easy bruising likely secondary to ASA and other medications. BMP in December 2020 was stable for her. She had flu shot in 2020. On May 21, 2021 she came in for follow-up visit. She has been followed by neurologist for the Parkinson. She appears dry. She also has dry oral mucosa. I recommend to drink extra fluid. I will check CBC today. She had the Covid vaccine. She denied any acute pain. She denied any fever or chills. No nausea, vomiting or diarrhea. No dysuria or hematuria. I will consider repeat bone marrow study if CBC is worse. Past Medical History  Osteoarthritis, depression/anxiety, glaucoma, dyslipidemia, hypertension, obesity, anemia and thrombocytopenia. Surgical History     Cataract surgery in 2009, cholecystectomy in early 90, right breast biopsy in 1989, arthroscopy Knee surgery in 2008 and left knee replacement in March 2017. Complete hysterectomy in 1981.     Social History  Smoking Status Never smoker  She denied any alcohol or illicit drug use. She has 5 children. Family History     Mother had tumor of stomach. Review of Systems: \"Per interval history; otherwise 10 point ROS is negative. \"     Vital Signs:  /62 (Site: Right Upper Arm, Position: Sitting, Cuff Size: Medium Adult)   Pulse 80   Temp 97.3 °F (36.3 °C) (Temporal)   Resp 18   Ht 5' 8\" (1.727 m)   Wt 172 lb 6.4 oz (78.2 kg)   SpO2 94%   BMI 26.21 kg/m²     Physical Exam:    CONSTITUTIONAL: awake, alert, cooperative, no apparent distress +walker  EYES: sclera clear and conjunctiva pallor  ENT: Normocephalic, without obvious abnormality, atraumatic [place]  NECK: supple, symmetrical.  Dry oral mucosa. No JVD. HEMATOLOGIC/LYMPHATIC: no cervical, supraclavicular  lymphadenopathy   LUNGS: no increased work of breathing and clear to auscultation   CARDIOVASCULAR: regular rate and rhythm, normal S1 and S2, no murmur noted  ABDOMEN: normal bowel sounds x 4, soft, non-distended, non-tender  MUSCULOSKELETAL: full range of motion noted, tone is normal  NEUROLOGIC: awake, alert. Cranial nerves II through XII grossly intact. SKIN: No jaundice. Few bruises noted to upper extremities. She has thin skin. Dry skin. Decreased turgor. EXTREMITIES: no LE edema. She has arthritic changes to the distal joint of the fingers.      Labs:    Hematology:  Lab Results   Component Value Date    WBC 6.3 01/21/2021    RBC 3.35 (L) 01/21/2021    HGB 10.8 (L) 01/21/2021    HCT 34.5 (L) 01/21/2021    .0 (H) 01/21/2021    MCH 32.2 (H) 01/21/2021    MCHC 31.3 (L) 01/21/2021    RDW 13.8 01/21/2021    PLT 59 (L) 01/21/2021    MPV 14.0 (H) 01/21/2021    SEGSPCT 59.5 01/21/2021    EOSRELPCT 1.3 01/21/2021    BASOPCT 0.2 01/21/2021    LYMPHOPCT 28.8 01/21/2021    MONOPCT 10.2 (H) 01/21/2021    SEGSABS 3.7 01/21/2021    EOSABS 0.1 01/21/2021    BASOSABS 0.0 01/21/2021    LYMPHSABS 1.8 01/21/2021    MONOSABS 0.6 01/21/2021    DIFFTYPE AUTOMATED DIFFERENTIAL 01/21/2021     Chemistry:  Lab Results   Component Value Date     03/29/2021    K 4.5 03/29/2021     03/29/2021    CO2 29 03/29/2021    BUN 35 (H) 03/29/2021    CREATININE 1.8 (H) 03/29/2021    GLUCOSE 91 09/23/2020    CALCIUM 9.3 03/29/2021    PROT 6.1 (L) 03/29/2021    LABALBU 3.5 03/29/2021    BILITOT 0.4 03/29/2021    ALKPHOS 93 03/29/2021    AST 10 (L) 03/29/2021    ALT 5 (L) 03/29/2021    LABGLOM 27 (L) 03/29/2021    GFRAA 33 (L) 03/29/2021    AGRATIO 1.6 06/22/2020    GLOB 2.5 06/22/2020     Lab Results   Component Value Date     10/03/2018     Lab Results   Component Value Date    TSHHS 1.570 07/17/2020    T4FREE 1.30 07/17/2020     Immunology:  Lab Results   Component Value Date    PROT 6.1 (L) 03/29/2021     Anemia Panel:  Lab Results   Component Value Date    UEJICEXV74 491.8 07/17/2020    FOLATE 11.1 07/17/2020        Observations:  Performance Status: 1  Symptoms, but ambulatory. Restricted in physically strenuous activity, but ambulatory and able to carry out work of a light or sedentary nature   Depression Status: No data recorded    Cognitive Status: Mentally vague, Memory impairment and Normal concentration     Assessment & Plan:     1. She has macrocytic anemia and thrombocytopenia.,  B12 and folate level 2018 were within normal limits. Acute viral hepatitis serology was negative. Ultrasound of liver and spleen was negative for hepatosplenomegaly. Bone marrow study in October 2018 suggestive of low risk myelodysplasia. Labs in May 2019  were reviewed. CBC was stable. CBC in November 2019 was reviewed. CBC in March 2020 was stable. CBC 10/21/2020 revealed WBC 5.8, Hgb 10.1, .9, Platelets 103. I will check CBC today. If macrocytic anemia continues to get worse I may consider bone marrow study. 2. She was diagnosed with Parkinson disease. 3.  Dehydration. I recommend to drink extra water.     4. Depression- refused medication or counseling; denies suicidal intent    5. Bruising is related to thin skin and aspirin. Had flu shot in 2020. Return to clinic in 6 months or sooner. All of her question has been answered for today. Recent imaging and labs were reviewed and discussed with the patient.

## 2021-05-10 ENCOUNTER — OFFICE VISIT (OUTPATIENT)
Dept: INTERNAL MEDICINE CLINIC | Age: 86
End: 2021-05-10
Payer: MEDICARE

## 2021-05-10 VITALS
DIASTOLIC BLOOD PRESSURE: 74 MMHG | TEMPERATURE: 97.4 F | SYSTOLIC BLOOD PRESSURE: 116 MMHG | RESPIRATION RATE: 16 BRPM | BODY MASS INDEX: 26.67 KG/M2 | OXYGEN SATURATION: 96 % | HEART RATE: 76 BPM | WEIGHT: 175.4 LBS

## 2021-05-10 DIAGNOSIS — F03.91 DEMENTIA WITH BEHAVIORAL DISTURBANCE, UNSPECIFIED DEMENTIA TYPE: ICD-10-CM

## 2021-05-10 DIAGNOSIS — E78.2 MIXED HYPERLIPIDEMIA: ICD-10-CM

## 2021-05-10 DIAGNOSIS — R63.4 WEIGHT LOSS: ICD-10-CM

## 2021-05-10 DIAGNOSIS — R44.3 HALLUCINATION: ICD-10-CM

## 2021-05-10 DIAGNOSIS — G20 PARKINSONISM, UNSPECIFIED PARKINSONISM TYPE (HCC): ICD-10-CM

## 2021-05-10 DIAGNOSIS — K59.09 OTHER CONSTIPATION: ICD-10-CM

## 2021-05-10 DIAGNOSIS — D46.9 MYELODYSPLASTIC SYNDROME (HCC): ICD-10-CM

## 2021-05-10 DIAGNOSIS — F32.9 MAJOR DEPRESSIVE DISORDER WITH SINGLE EPISODE, REMISSION STATUS UNSPECIFIED: ICD-10-CM

## 2021-05-10 DIAGNOSIS — D69.6 THROMBOCYTOPENIA (HCC): ICD-10-CM

## 2021-05-10 DIAGNOSIS — I10 ESSENTIAL HYPERTENSION: Primary | ICD-10-CM

## 2021-05-10 PROCEDURE — 4040F PNEUMOC VAC/ADMIN/RCVD: CPT | Performed by: INTERNAL MEDICINE

## 2021-05-10 PROCEDURE — 99214 OFFICE O/P EST MOD 30 MIN: CPT | Performed by: INTERNAL MEDICINE

## 2021-05-10 PROCEDURE — 1090F PRES/ABSN URINE INCON ASSESS: CPT | Performed by: INTERNAL MEDICINE

## 2021-05-10 PROCEDURE — 1123F ACP DISCUSS/DSCN MKR DOCD: CPT | Performed by: INTERNAL MEDICINE

## 2021-05-10 PROCEDURE — G8427 DOCREV CUR MEDS BY ELIG CLIN: HCPCS | Performed by: INTERNAL MEDICINE

## 2021-05-10 PROCEDURE — 1036F TOBACCO NON-USER: CPT | Performed by: INTERNAL MEDICINE

## 2021-05-10 PROCEDURE — G8399 PT W/DXA RESULTS DOCUMENT: HCPCS | Performed by: INTERNAL MEDICINE

## 2021-05-10 PROCEDURE — G8417 CALC BMI ABV UP PARAM F/U: HCPCS | Performed by: INTERNAL MEDICINE

## 2021-05-10 RX ORDER — LINACLOTIDE 290 UG/1
CAPSULE, GELATIN COATED ORAL
COMMUNITY
Start: 2021-04-01

## 2021-05-10 RX ORDER — INCONTINENCE PAD,LINER,DISP
2 EACH MISCELLANEOUS NIGHTLY
Status: ON HOLD | COMMUNITY
Start: 2021-04-01 | End: 2021-07-20 | Stop reason: HOSPADM

## 2021-05-10 NOTE — PROGRESS NOTES
José Manuel Romano  Patient's  is 1935  Seen in office on 5/10/2021      SUBJECTIVE:  Jyotsna omer 82 y. o.year old female presents today   Chief Complaint   Patient presents with    Hypertension    Hyperlipidemia    Dementia    Results     lab    Other     did have covid vaccine    Other     saw dr Fernandez Bajwa for constipation     Patient is here with her  for follow-up of hypertension, hyperlipidemia and dementia and to review the labs. Patient has taken the COVID-19 vaccine  Lab results were reviewed with the patient. Patient has hypertension. Taking medications No headaches, no chest pain, no palpitations and no dizziness. Patient has hyperlipidemia. Taking medications. No abdominal pain, no nausea or vomiting. No myalgias. Patient has history of depression and dementia  Patient is having more hallucination. Patient has a neurologist Dr. Felisha Bray and is taking Seroquel. Has been has multiple questions about that. Advised patient to talk to neurologist also whether they can increase the dose of Seroquel. Patient has constipation and has seen gastroenterologist Dr. Wendy Tellez and he started the patient on Linzess and senna    Taking medications regularly. No side effects noted. Review of Systems    Review of system is normal except as in HPI  Patient had no fever or chills  Denies any falls  Rest as mentioned in HPI  OBJECTIVE: /74   Pulse 76   Temp 97.4 °F (36.3 °C) (Oral)   Resp 16   Wt 175 lb 6.4 oz (79.6 kg)   SpO2 96%   BMI 26.67 kg/m²     Wt Readings from Last 3 Encounters:   05/10/21 175 lb 6.4 oz (79.6 kg)   21 177 lb 3.2 oz (80.4 kg)   21 182 lb 12.8 oz (82.9 kg)      Patient was seen taking COVID-19 precautions. Face mask, gloves were used. Patient also wore facemask. GENERAL:  Alert, oriented, pleasant, in no apparent distress. HEENT:  Conjunctiva pink, no scleral icterus. ENT clear. NECK: Supple. No jugular venous distention noted.  No masses felt,  CARDIOVASCULAR:  Normal S1 and S2    PULMONARY:  No respiratory distress. No wheezes or rales. ABDOMEN:  Soft and non-tender,no masses  ororganomegaly. EXTREMITIES:  No cyanosis, clubbing, or significant edema. SKIN: Skin is warm and dry. NEUROLOGICAL:  Cranial nerves II through XII are grossly intact. Patient is using walker to ambulate  Patient is confused and disoriented    IMPRESSION:    Encounter Diagnoses   Name Primary?  Essential hypertension Yes    Dementia with behavioral disturbance, unspecified dementia type ([address]     Hallucination     Parkinsonism, unspecified Parkinsonism type ([address]     Myelodysplastic syndrome ([address]     Mixed hyperlipidemia     Major depressive disorder with single episode, remission status unspecified     Weight loss     Thrombocytopenia (HCC)     Other constipation        ASSESSMENT/PLAN:    . Hypertension is controlled with losartan and amlodipine. Continue the same  . Hyperlipidemia: Lipid profile looks good continue atorvastatin 10 mg daily  . Glaucoma: Patient is using eyedrops and sees ophthalmologist  .  Depression by history. Hallucinations. Has been needs to discuss with neurologist  .  Weight loss: Stable. Refused work-up in the past.  .  Thrombocytopenia mild: Patient has MDS  . Myelodysplastic syndrome: Patient sees Dr. Luis E Ovalles  .  Parkinsonism: Patient sees neurologist at [facility]. On Sinemet 3 times daily  . Dementia with behavioral problems: Follow-up with neurologist    Long discussion with the patient's. He has multiple questions some of them he needs to discuss with neurologist.  Patient herself does not have any questions. He is sitting quiet    No problem-specific Assessment & Plan notes found for this encounter. Mediations reviewed with the patient. Continue current medications. Appropriate prescriptions are addressed. After visit summeryprovided. Follow up as directed sooner if needed.   Questions answered and patient verbalizes understanding. Call for any problems, questions, or concerns. No Known Allergies  Current Outpatient Medications   Medication Sig Dispense Refill    LINZESS 290 MCG CAPS capsule TAKE 1 CAPSULE BY MOUTH EVERY DAY      [facility] SENNA PLUS 8.6-50 MG per tablet Take 1 tablet by mouth daily       amLODIPine (NORVASC) 10 MG tablet TAKE 1 TABLET DAILY 90 tablet 1    losartan (COZAAR) 100 MG tablet TAKE 1 TABLET DAILY.       (DISCONTINUE LISINOPRIL) 90 tablet 1    atorvastatin (LIPITOR) 10 MG tablet TAKE 1 TABLET DAILY 90 tablet 1    pramoxine-zinc oxide in mineral oil (TUCKS HEMORRHOIDAL) 1-12.5 % OINT ointment Place rectally 3 times daily as needed (pain relief) 1 Tube 5    clotrimazole-betamethasone (LOTRISONE) 1-0.05 % cream Apply topically 2 times daily. 45 g 1    carbidopa-levodopa (SINEMET)  MG per tablet TAKE 1 (ONE) TABLET BY MOUTH 3 (THREE) TIMES A DAY .  tolterodine (DETROL LA) 4 MG extended release capsule Take 4 mg by mouth daily Dr. Jack Rivas      QUEtiapine (SEROQUEL) 50 MG tablet Take 50 mg by mouth nightly      aspirin 81 MG tablet Take 81 mg by mouth daily.  latanoprost (XALATAN) 0.005 % ophthalmic solution 1 drop nightly.  lactulose (CHRONULAC) 10 GM/15ML solution Take 15 mLs by mouth daily (Patient not taking: Reported on 5/10/2021) 1 Bottle 5     No current facility-administered medications for this visit. Past Medical History:   Diagnosis Date    Colonoscopy refused     Glaucoma     Dr. Richard Calderon Hyperlipidemia     Hypertension     Major depressive disorder with single episode 9/13/2018    Patient has depression and agitation. Was started on the Lexapro. 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    Antibiotic prophylaxis recommended by ortho when needed.     Thrombocytopenia ([address] 9/23/2018    Patient is referred to hematologist     Past Surgical History:   Procedure Laterality Date    CATARACT REMOVAL Bilateral     CHOLECYSTECTOMY      HYSTERECTOMY Bilateral 1981    KNEE ARTHROSCOPY Left 2007    TOTAL KNEE ARTHROPLASTY Left 03/10/2017     Social History     Tobacco Use    Smoking status: Never Smoker    Smokeless tobacco: Never Used   Substance Use Topics    Alcohol use: No     Alcohol/week: 0.0 standard drinks       LAB REVIEW:  CBC:   Lab Results   Component Value Date    WBC 6.3 01/21/2021    HGB 10.8 01/21/2021    HCT 34.5 01/21/2021    PLT 59 01/21/2021     Lipids:   Lab Results   Component Value Date    HDL 61 03/29/2021    LDLCALC 96 06/22/2020    LDLDIRECT 82 03/29/2021    TRIGLYCFAST 66 03/29/2021    CHOLFAST 155 03/29/2021     Renal:   Lab Results   Component Value Date    BUN 35 03/29/2021    CREATININE 1.8 03/29/2021     03/29/2021    K 4.5 03/29/2021    ALT 5 03/29/2021    AST 10 03/29/2021    GLUCOSE 91 09/23/2020    GLUF 101 03/29/2021     PT/INR: No results found for: INR  A1C: No results found for: Edwin Pace MD, 5/10/2021 , 3:14 PM

## 2021-05-16 PROBLEM — K59.09 OTHER CONSTIPATION: Status: ACTIVE | Noted: 2021-05-16

## 2021-05-21 ENCOUNTER — OFFICE VISIT (OUTPATIENT)
Dept: ONCOLOGY | Age: 86
End: 2021-05-21
Payer: MEDICARE

## 2021-05-21 ENCOUNTER — HOSPITAL ENCOUNTER (OUTPATIENT)
Dept: INFUSION THERAPY | Age: 86
Discharge: HOME | End: 2021-05-21
Payer: MEDICARE

## 2021-05-21 VITALS
TEMPERATURE: 97.3 F | DIASTOLIC BLOOD PRESSURE: 62 MMHG | OXYGEN SATURATION: 94 % | BODY MASS INDEX: 26.13 KG/M2 | RESPIRATION RATE: 18 BRPM | WEIGHT: 172.4 LBS | HEIGHT: 68 IN | SYSTOLIC BLOOD PRESSURE: 119 MMHG | HEART RATE: 80 BPM

## 2021-05-21 DIAGNOSIS — D64.9 ANEMIA, UNSPECIFIED TYPE: Primary | ICD-10-CM

## 2021-05-21 DIAGNOSIS — D64.9 ANEMIA, UNSPECIFIED TYPE: ICD-10-CM

## 2021-05-21 LAB
BASOPHILS ABSOLUTE: 0 K/CU MM
BASOPHILS RELATIVE PERCENT: 0.3 % (ref 0–1)
DIFFERENTIAL TYPE: ABNORMAL
EOSINOPHILS ABSOLUTE: 0.1 K/CU MM
EOSINOPHILS RELATIVE PERCENT: 1.9 % (ref 0–3)
HCT VFR BLD CALC: 33.9 % (ref 37–47)
HEMOGLOBIN: 10.7 GM/DL (ref 12.5–16)
LYMPHOCYTES ABSOLUTE: 1.4 K/CU MM
LYMPHOCYTES RELATIVE PERCENT: 18.5 % (ref 24–44)
MCH RBC QN AUTO: 32.4 PG (ref 27–31)
MCHC RBC AUTO-ENTMCNC: 31.6 % (ref 32–36)
MCV RBC AUTO: 102.7 FL (ref 78–100)
MONOCYTES ABSOLUTE: 0.6 K/CU MM
MONOCYTES RELATIVE PERCENT: 8.4 % (ref 0–4)
PDW BLD-RTO: 13.6 % (ref 11.7–14.9)
PLATELET # BLD: 201 K/CU MM (ref 140–440)
PMV BLD AUTO: 11 FL (ref 7.5–11.1)
RBC # BLD: 3.3 M/CU MM (ref 4.2–5.4)
SEGMENTED NEUTROPHILS ABSOLUTE COUNT: 5.3 K/CU MM
SEGMENTED NEUTROPHILS RELATIVE PERCENT: 70.9 % (ref 36–66)
WBC # BLD: 7.4 K/CU MM (ref 4–10.5)

## 2021-05-21 PROCEDURE — 36415 COLL VENOUS BLD VENIPUNCTURE: CPT

## 2021-05-21 PROCEDURE — G8417 CALC BMI ABV UP PARAM F/U: HCPCS | Performed by: INTERNAL MEDICINE

## 2021-05-21 PROCEDURE — 99211 OFF/OP EST MAY X REQ PHY/QHP: CPT

## 2021-05-21 PROCEDURE — 4040F PNEUMOC VAC/ADMIN/RCVD: CPT | Performed by: INTERNAL MEDICINE

## 2021-05-21 PROCEDURE — 1090F PRES/ABSN URINE INCON ASSESS: CPT | Performed by: INTERNAL MEDICINE

## 2021-05-21 PROCEDURE — 1123F ACP DISCUSS/DSCN MKR DOCD: CPT | Performed by: INTERNAL MEDICINE

## 2021-05-21 PROCEDURE — 85025 COMPLETE CBC W/AUTO DIFF WBC: CPT

## 2021-05-21 PROCEDURE — 99213 OFFICE O/P EST LOW 20 MIN: CPT | Performed by: INTERNAL MEDICINE

## 2021-05-21 PROCEDURE — 1036F TOBACCO NON-USER: CPT | Performed by: INTERNAL MEDICINE

## 2021-05-21 PROCEDURE — G8399 PT W/DXA RESULTS DOCUMENT: HCPCS | Performed by: INTERNAL MEDICINE

## 2021-05-21 PROCEDURE — G8427 DOCREV CUR MEDS BY ELIG CLIN: HCPCS | Performed by: INTERNAL MEDICINE

## 2021-05-21 NOTE — PROGRESS NOTES
MA Rooming Questions  Patient: Alysia Carl  MRN: [id number]    Date: 5/21/2021        1. Do you have any new issues?   no         2. Do you need any refills on medications?    no    3. Have you had any imaging done since your last visit?   no    4. Have you been hospitalized or seen in the emergency room since your last visit here?   no    5. Did the patient have a depression screening completed today?  No    No data recorded     PHQ-9 Given to (if applicable):               PHQ-9 Score (if applicable):                     [] Positive     []  Negative              Does question #9 need addressed (if applicable)                     [] Yes    []  No               Magdaleno Gomez CMA

## 2021-07-16 ENCOUNTER — HOSPITAL ENCOUNTER (OUTPATIENT)
Age: 86
Setting detail: OBSERVATION
Discharge: NURSING, REHAB OR LONG-TERM CARE | End: 2021-07-20
Attending: EMERGENCY MEDICINE | Admitting: INTERNAL MEDICINE
Payer: MEDICARE

## 2021-07-16 ENCOUNTER — APPOINTMENT (OUTPATIENT)
Dept: CT IMAGING | Age: 86
End: 2021-07-16
Payer: MEDICARE

## 2021-07-16 ENCOUNTER — APPOINTMENT (OUTPATIENT)
Dept: GENERAL RADIOLOGY | Age: 86
End: 2021-07-16
Payer: MEDICARE

## 2021-07-16 DIAGNOSIS — R44.3 HALLUCINATIONS: ICD-10-CM

## 2021-07-16 DIAGNOSIS — R41.82 ALTERED MENTAL STATUS, UNSPECIFIED ALTERED MENTAL STATUS TYPE: Primary | ICD-10-CM

## 2021-07-16 PROBLEM — S51.012A SKIN TEAR OF LEFT ELBOW WITHOUT COMPLICATION: Status: ACTIVE | Noted: 2021-07-16

## 2021-07-16 PROBLEM — G20 DEMENTIA ASSOCIATED WITH PARKINSON'S DISEASE (HCC): Status: ACTIVE | Noted: 2021-07-16

## 2021-07-16 PROBLEM — F02.80 DEMENTIA ASSOCIATED WITH PARKINSON'S DISEASE (HCC): Status: ACTIVE | Noted: 2021-07-16

## 2021-07-16 LAB
ALBUMIN SERPL-MCNC: 3.5 GM/DL (ref 3.4–5)
ALP BLD-CCNC: 91 IU/L (ref 40–129)
ALT SERPL-CCNC: 6 U/L (ref 10–40)
AMMONIA: 11 UMOL/L (ref 11–51)
ANION GAP SERPL CALCULATED.3IONS-SCNC: 16 MMOL/L (ref 4–16)
AST SERPL-CCNC: 11 IU/L (ref 15–37)
BACTERIA: ABNORMAL /HPF
BASOPHILS ABSOLUTE: 0 K/CU MM
BASOPHILS RELATIVE PERCENT: 0.5 % (ref 0–1)
BILIRUB SERPL-MCNC: 0.4 MG/DL (ref 0–1)
BILIRUBIN URINE: NEGATIVE MG/DL
BLOOD, URINE: NEGATIVE
BUN BLDV-MCNC: 31 MG/DL (ref 6–23)
CALCIUM SERPL-MCNC: 9.3 MG/DL (ref 8.3–10.6)
CAST TYPE: NEGATIVE /HPF
CHLORIDE BLD-SCNC: 105 MMOL/L (ref 99–110)
CLARITY: ABNORMAL
CO2: 18 MMOL/L (ref 21–32)
COLOR: YELLOW
CREAT SERPL-MCNC: 1.7 MG/DL (ref 0.6–1.1)
CRYSTAL TYPE: NEGATIVE /HPF
DIFFERENTIAL TYPE: ABNORMAL
EOSINOPHILS ABSOLUTE: 0.1 K/CU MM
EOSINOPHILS RELATIVE PERCENT: 1.4 % (ref 0–3)
EPITHELIAL CELLS, UA: ABNORMAL /HPF
FERRITIN: 263 NG/ML (ref 15–150)
FOLATE: 6.4 NG/ML (ref 3.1–17.5)
GFR AFRICAN AMERICAN: 35 ML/MIN/1.73M2
GFR NON-AFRICAN AMERICAN: 29 ML/MIN/1.73M2
GLUCOSE BLD-MCNC: 89 MG/DL (ref 70–99)
GLUCOSE BLD-MCNC: 95 MG/DL (ref 70–99)
GLUCOSE, URINE: NEGATIVE MG/DL
HCT VFR BLD CALC: 34.9 % (ref 37–47)
HEMOGLOBIN: 10.9 GM/DL (ref 12.5–16)
IMMATURE NEUTROPHIL %: 0.2 % (ref 0–0.43)
IRON: 51 UG/DL (ref 37–145)
KETONES, URINE: NEGATIVE MG/DL
LEUKOCYTE ESTERASE, URINE: ABNORMAL
LYMPHOCYTES ABSOLUTE: 1.7 K/CU MM
LYMPHOCYTES RELATIVE PERCENT: 26.8 % (ref 24–44)
MCH RBC QN AUTO: 31.9 PG (ref 27–31)
MCHC RBC AUTO-ENTMCNC: 31.2 % (ref 32–36)
MCV RBC AUTO: 102 FL (ref 78–100)
MONOCYTES ABSOLUTE: 0.5 K/CU MM
MONOCYTES RELATIVE PERCENT: 7.7 % (ref 0–4)
NITRITE URINE, QUANTITATIVE: NEGATIVE
PCT TRANSFERRIN: 28 % (ref 10–44)
PDW BLD-RTO: 13.6 % (ref 11.7–14.9)
PH, URINE: 6.5 (ref 5–8)
PLATELET # BLD: 39 K/CU MM (ref 140–440)
PMV BLD AUTO: 12.2 FL (ref 7.5–11.1)
POTASSIUM SERPL-SCNC: 4.8 MMOL/L (ref 3.5–5.1)
PROTEIN UA: NEGATIVE MG/DL
RBC # BLD: 3.42 M/CU MM (ref 4.2–5.4)
RBC URINE: NEGATIVE /HPF (ref 0–6)
RETICULOCYTE COUNT PCT: 1.1 % (ref 0.2–2.2)
SEGMENTED NEUTROPHILS ABSOLUTE COUNT: 4 K/CU MM
SEGMENTED NEUTROPHILS RELATIVE PERCENT: 63.4 % (ref 36–66)
SODIUM BLD-SCNC: 139 MMOL/L (ref 135–145)
SPECIFIC GRAVITY UA: 1.01 (ref 1–1.03)
TOTAL IMMATURE NEUTOROPHIL: 0.01 K/CU MM
TOTAL IRON BINDING CAPACITY: 184 UG/DL (ref 250–450)
TOTAL PROTEIN: 6.2 GM/DL (ref 6.4–8.2)
UNSATURATED IRON BINDING CAPACITY: 133 UG/DL (ref 110–370)
UROBILINOGEN, URINE: 0.2 MG/DL (ref 0.2–1)
VITAMIN B-12: 498.9 PG/ML (ref 211–911)
WBC # BLD: 6.3 K/CU MM (ref 4–10.5)
WBC UA: ABNORMAL /HPF (ref 0–5)

## 2021-07-16 PROCEDURE — 82140 ASSAY OF AMMONIA: CPT

## 2021-07-16 PROCEDURE — 99220 PR INITIAL OBSERVATION CARE/DAY 70 MINUTES: CPT | Performed by: INTERNAL MEDICINE

## 2021-07-16 PROCEDURE — 99284 EMERGENCY DEPT VISIT MOD MDM: CPT

## 2021-07-16 PROCEDURE — 70450 CT HEAD/BRAIN W/O DYE: CPT

## 2021-07-16 PROCEDURE — 81001 URINALYSIS AUTO W/SCOPE: CPT

## 2021-07-16 PROCEDURE — 82728 ASSAY OF FERRITIN: CPT

## 2021-07-16 PROCEDURE — G0378 HOSPITAL OBSERVATION PER HR: HCPCS

## 2021-07-16 PROCEDURE — 72125 CT NECK SPINE W/O DYE: CPT

## 2021-07-16 PROCEDURE — 2500000003 HC RX 250 WO HCPCS: Performed by: INTERNAL MEDICINE

## 2021-07-16 PROCEDURE — 83550 IRON BINDING TEST: CPT

## 2021-07-16 PROCEDURE — 82746 ASSAY OF FOLIC ACID SERUM: CPT

## 2021-07-16 PROCEDURE — 82962 GLUCOSE BLOOD TEST: CPT

## 2021-07-16 PROCEDURE — 2580000003 HC RX 258: Performed by: INTERNAL MEDICINE

## 2021-07-16 PROCEDURE — 71045 X-RAY EXAM CHEST 1 VIEW: CPT

## 2021-07-16 PROCEDURE — 83540 ASSAY OF IRON: CPT

## 2021-07-16 PROCEDURE — 36415 COLL VENOUS BLD VENIPUNCTURE: CPT

## 2021-07-16 PROCEDURE — 82607 VITAMIN B-12: CPT

## 2021-07-16 PROCEDURE — 6370000000 HC RX 637 (ALT 250 FOR IP): Performed by: INTERNAL MEDICINE

## 2021-07-16 PROCEDURE — 85025 COMPLETE CBC W/AUTO DIFF WBC: CPT

## 2021-07-16 PROCEDURE — 80053 COMPREHEN METABOLIC PANEL: CPT

## 2021-07-16 PROCEDURE — 85045 AUTOMATED RETICULOCYTE COUNT: CPT

## 2021-07-16 PROCEDURE — 87086 URINE CULTURE/COLONY COUNT: CPT

## 2021-07-16 PROCEDURE — 93005 ELECTROCARDIOGRAM TRACING: CPT | Performed by: EMERGENCY MEDICINE

## 2021-07-16 RX ORDER — CLOTRIMAZOLE AND BETAMETHASONE DIPROPIONATE 10; .64 MG/G; MG/G
CREAM TOPICAL 2 TIMES DAILY
Status: DISCONTINUED | OUTPATIENT
Start: 2021-07-16 | End: 2021-07-20 | Stop reason: HOSPADM

## 2021-07-16 RX ORDER — SODIUM CHLORIDE 9 MG/ML
25 INJECTION, SOLUTION INTRAVENOUS PRN
Status: DISCONTINUED | OUTPATIENT
Start: 2021-07-16 | End: 2021-07-20 | Stop reason: HOSPADM

## 2021-07-16 RX ORDER — ACETAMINOPHEN 650 MG/1
650 SUPPOSITORY RECTAL EVERY 6 HOURS PRN
Status: DISCONTINUED | OUTPATIENT
Start: 2021-07-16 | End: 2021-07-20 | Stop reason: HOSPADM

## 2021-07-16 RX ORDER — LATANOPROST 50 UG/ML
1 SOLUTION/ DROPS OPHTHALMIC NIGHTLY
Status: DISCONTINUED | OUTPATIENT
Start: 2021-07-16 | End: 2021-07-20 | Stop reason: HOSPADM

## 2021-07-16 RX ORDER — ASPIRIN 81 MG/1
81 TABLET, CHEWABLE ORAL DAILY
Status: DISCONTINUED | OUTPATIENT
Start: 2021-07-16 | End: 2021-07-20 | Stop reason: HOSPADM

## 2021-07-16 RX ORDER — SODIUM CHLORIDE 0.9 % (FLUSH) 0.9 %
5-40 SYRINGE (ML) INJECTION EVERY 12 HOURS SCHEDULED
Status: DISCONTINUED | OUTPATIENT
Start: 2021-07-16 | End: 2021-07-20 | Stop reason: HOSPADM

## 2021-07-16 RX ORDER — TOLTERODINE 4 MG/1
4 CAPSULE, EXTENDED RELEASE ORAL DAILY
Status: DISCONTINUED | OUTPATIENT
Start: 2021-07-16 | End: 2021-07-16 | Stop reason: CLARIF

## 2021-07-16 RX ORDER — ATORVASTATIN CALCIUM 10 MG/1
10 TABLET, FILM COATED ORAL DAILY
Status: DISCONTINUED | OUTPATIENT
Start: 2021-07-16 | End: 2021-07-20 | Stop reason: HOSPADM

## 2021-07-16 RX ORDER — LACTULOSE 10 G/15ML
10 SOLUTION ORAL DAILY
Status: DISCONTINUED | OUTPATIENT
Start: 2021-07-16 | End: 2021-07-17

## 2021-07-16 RX ORDER — TROSPIUM CHLORIDE 20 MG/1
20 TABLET, FILM COATED ORAL NIGHTLY
Status: DISCONTINUED | OUTPATIENT
Start: 2021-07-16 | End: 2021-07-20 | Stop reason: HOSPADM

## 2021-07-16 RX ORDER — ONDANSETRON 2 MG/ML
4 INJECTION INTRAMUSCULAR; INTRAVENOUS EVERY 6 HOURS PRN
Status: DISCONTINUED | OUTPATIENT
Start: 2021-07-16 | End: 2021-07-20 | Stop reason: HOSPADM

## 2021-07-16 RX ORDER — POLYETHYLENE GLYCOL 3350 17 G/17G
17 POWDER, FOR SOLUTION ORAL DAILY PRN
Status: DISCONTINUED | OUTPATIENT
Start: 2021-07-16 | End: 2021-07-17

## 2021-07-16 RX ORDER — SENNA AND DOCUSATE SODIUM 50; 8.6 MG/1; MG/1
1 TABLET, FILM COATED ORAL DAILY
Status: DISCONTINUED | OUTPATIENT
Start: 2021-07-16 | End: 2021-07-17

## 2021-07-16 RX ORDER — QUETIAPINE FUMARATE 50 MG/1
50 TABLET, FILM COATED ORAL NIGHTLY
Status: DISCONTINUED | OUTPATIENT
Start: 2021-07-16 | End: 2021-07-17

## 2021-07-16 RX ORDER — ONDANSETRON 4 MG/1
4 TABLET, ORALLY DISINTEGRATING ORAL EVERY 8 HOURS PRN
Status: DISCONTINUED | OUTPATIENT
Start: 2021-07-16 | End: 2021-07-20 | Stop reason: HOSPADM

## 2021-07-16 RX ORDER — SODIUM CHLORIDE 0.9 % (FLUSH) 0.9 %
5-40 SYRINGE (ML) INJECTION PRN
Status: DISCONTINUED | OUTPATIENT
Start: 2021-07-16 | End: 2021-07-20 | Stop reason: HOSPADM

## 2021-07-16 RX ORDER — ACETAMINOPHEN 325 MG/1
650 TABLET ORAL EVERY 6 HOURS PRN
Status: DISCONTINUED | OUTPATIENT
Start: 2021-07-16 | End: 2021-07-20 | Stop reason: HOSPADM

## 2021-07-16 RX ORDER — AMLODIPINE BESYLATE 10 MG/1
10 TABLET ORAL DAILY
Status: DISCONTINUED | OUTPATIENT
Start: 2021-07-16 | End: 2021-07-20 | Stop reason: HOSPADM

## 2021-07-16 RX ADMIN — CLOTRIMAZOLE AND BETAMETHASONE DIPROPIONATE: 10; .5 CREAM TOPICAL at 21:47

## 2021-07-16 RX ADMIN — CARBIDOPA AND LEVODOPA 1 TABLET: 25; 100 TABLET ORAL at 21:47

## 2021-07-16 RX ADMIN — DOCUSATE SODIUM 50 MG AND SENNOSIDES 8.6 MG 1 TABLET: 8.6; 5 TABLET, FILM COATED ORAL at 21:50

## 2021-07-16 RX ADMIN — TROSPIUM CHLORIDE 20 MG: 20 TABLET, FILM COATED ORAL at 21:47

## 2021-07-16 RX ADMIN — ATORVASTATIN CALCIUM 10 MG: 10 TABLET, FILM COATED ORAL at 21:47

## 2021-07-16 RX ADMIN — LACTULOSE 10 G: 10 SOLUTION ORAL at 21:47

## 2021-07-16 RX ADMIN — QUETIAPINE FUMARATE 50 MG: 50 TABLET ORAL at 21:47

## 2021-07-16 RX ADMIN — SODIUM BICARBONATE: 84 INJECTION, SOLUTION INTRAVENOUS at 22:37

## 2021-07-16 RX ADMIN — AMLODIPINE BESYLATE 10 MG: 10 TABLET ORAL at 21:50

## 2021-07-16 RX ADMIN — ASPIRIN 81 MG CHEWABLE TABLET 81 MG: 81 TABLET CHEWABLE at 21:50

## 2021-07-16 RX ADMIN — LATANOPROST 1 DROP: 50 SOLUTION OPHTHALMIC at 21:47

## 2021-07-16 RX ADMIN — SODIUM CHLORIDE, PRESERVATIVE FREE 10 ML: 5 INJECTION INTRAVENOUS at 21:53

## 2021-07-16 ASSESSMENT — PAIN SCALES - GENERAL
PAINLEVEL_OUTOF10: 4
PAINLEVEL_OUTOF10: 3
PAINLEVEL_OUTOF10: 0

## 2021-07-16 ASSESSMENT — PAIN DESCRIPTION - PROGRESSION
CLINICAL_PROGRESSION: NOT CHANGED

## 2021-07-16 ASSESSMENT — PAIN DESCRIPTION - ONSET: ONSET: UNABLE TO TELL

## 2021-07-16 ASSESSMENT — ENCOUNTER SYMPTOMS
SHORTNESS OF BREATH: 0
NAUSEA: 0
RHINORRHEA: 0
SORE THROAT: 0
BACK PAIN: 0
EYE DISCHARGE: 0
EYE PAIN: 0
COUGH: 0
ABDOMINAL PAIN: 0
VOMITING: 0

## 2021-07-16 ASSESSMENT — PAIN DESCRIPTION - PAIN TYPE
TYPE: ACUTE PAIN
TYPE: ACUTE PAIN

## 2021-07-16 ASSESSMENT — PAIN DESCRIPTION - LOCATION
LOCATION: NECK
LOCATION: NECK

## 2021-07-16 ASSESSMENT — PAIN DESCRIPTION - DESCRIPTORS
DESCRIPTORS: ACHING
DESCRIPTORS: ACHING;DISCOMFORT

## 2021-07-16 ASSESSMENT — PAIN DESCRIPTION - FREQUENCY: FREQUENCY: INTERMITTENT

## 2021-07-16 ASSESSMENT — PAIN - FUNCTIONAL ASSESSMENT: PAIN_FUNCTIONAL_ASSESSMENT: ACTIVITIES ARE NOT PREVENTED

## 2021-07-16 ASSESSMENT — PAIN DESCRIPTION - ORIENTATION: ORIENTATION: LEFT;RIGHT

## 2021-07-16 NOTE — ED NOTES
The patient presents to the er today by medics. The patient had a fall on Monday and she has a bruise to the left knee and an abrasion to the left elbow. The  call medics today because she had an altered mental status at 1030 this morning. She is awake and alert now and follow commands appropriately. The  is at the bedside. POC glucose was 85.      Felisha Mike RN  07/16/21 [id number]

## 2021-07-16 NOTE — H&P
Department of Internal Medicine  hospitals MEDICINE  Attending Physician History and Physical    PCP: Denton Jo MD  MRN: [id number]    CHIEF COMPLAINT: Change in mental status  Reason for Admission: For further work-up and management  History Obtained From:  patient, electronic medical record, ED provider    HISTORY OF PRESENT ILLNESS:    The patient Koffi Tang is a 82 y.o. female with significant past medical history of essential hypertension, Parkinson's disease with associated dementia, CKD stage III, glaucoma, myelodysplastic syndrome; who presents with change in mental status to the ED, and is being admitted to the hospitalist service for further work-up and management. No family were at her bedside when I went and examine her. However according to the charts, patient could not recognize the  and therefore he had activated the squad and patient was brought to the ED. Patient could not give any meaningful history although she was able to tell me she fell with injury to the left elbow. Extensive work-up including CT brain and chest x-ray had been unremarkable. UA was also negative and there was no focus of infection. Patient was therefore admitted for further work-up and management. Past Medical History:        Diagnosis Date    Colonoscopy refused     Glaucoma     Dr. Ana Senior Hyperlipidemia     Hypertension     Major depressive disorder with single episode 9/13/2018    Patient has depression and agitation. Was started on the Lexapro.  Myelodysplastic syndrome ([address] 10/22/2018    Dx 10/2018 : Myelodysplastic syndrome with multilineage dysplasia Dr Susan Paiz    Obesity     Status post total left knee replacement 4/27/2017    Pt had  Left TKA on 3/2017 in [place] Dr Mirlande Cadet. Antibiotic prophylaxis recommended by ortho when needed.     Thrombocytopenia ([address] 9/23/2018    Patient is referred to hematologist       Past Surgical History:        Procedure Laterality Date    CATARACT REMOVAL Bilateral     CHOLECYSTECTOMY      HYSTERECTOMY Bilateral 1981    KNEE ARTHROSCOPY Left 2007    TOTAL KNEE ARTHROPLASTY Left 03/10/2017       Immunizations:              Influenza:  Up-to-date            Pneumococcal Polysaccharide:  Up-to-date; approximate date: 7/16/2021    Medications Prior to Admission:    Prior to Admission medications    Medication Sig Start Date End Date Taking? Authorizing Provider   LINZESS 290 MCG CAPS capsule TAKE 1 CAPSULE BY MOUTH EVERY DAY 4/1/21   Historical Provider, MD   [facility] SENNA PLUS 8.6-50 MG per tablet Take 1 tablet by mouth daily  4/1/21   Historical Provider, MD   amLODIPine (NORVASC) 10 MG tablet TAKE 1 TABLET DAILY 4/19/21   Chilango Maurice MD   losartan (COZAAR) 100 MG tablet TAKE 1 TABLET DAILY.       (DISCONTINUE LISINOPRIL) 4/19/21   Chilango Maurice MD   atorvastatin (LIPITOR) 10 MG tablet TAKE 1 TABLET DAILY 4/19/21   Chilango Maurice MD   lactulose [facility]) 10 GM/15ML solution Take 15 mLs by mouth daily 3/30/21   April COURTNEY Shelley CNP   pramoxine-zinc oxide in mineral oil (TUCKS HEMORRHOIDAL) 1-12.5 % OINT ointment Place rectally 3 times daily as needed (pain relief) 3/30/21   April T Costa, APRN - CNP   clotrimazole-betamethasone (LOTRISONE) 1-0.05 % cream Apply topically 2 times daily. 1/5/21   Chilango Maurice MD   carbidopa-levodopa (SINEMET)  MG per tablet TAKE 1 (ONE) TABLET BY MOUTH 3 (THREE) TIMES A DAY . 8/21/20   Historical Provider, MD   tolterodine (DETROL LA) 4 MG extended release capsule Take 4 mg by mouth daily Dr. Gilberto Haro Provider, MD   QUEtiapine (SEROQUEL) 50 MG tablet Take 50 mg by mouth nightly    Historical Provider, MD   aspirin 81 MG tablet Take 81 mg by mouth daily. Historical Provider, MD   latanoprost (XALATAN) 0.005 % ophthalmic solution 1 drop nightly. Historical Provider, MD       Allergies:  Patient has no known allergies.     Social History:   TOBACCO:   reports that she has never smoked. She has never used smokeless tobacco.  ETOH:   reports no history of alcohol use. DOMESTIC VIOLENCE:  no  ACTIVITIES OF DAILY LIVING:  Patient is not able to bathe self, dress self and feed self. INSTRUMENTAL ACTIVITIES OF DAILY LIVING:  Patient is unable to. travel outside of home, shop, prepare meals, do housework and handle finances. MARITAL STATUS:    OCCUPATION:  Retired  Patient currently lives with her     Code Status:  Full code    Family History:   History reviewed. No pertinent family history. Review of Systems:  ALL SYSTEMS WERE REVIEWED AND WERE UNREMARKABLE, WITH THE PERTINENT POSITIVES AND NEGATIVES AS DOCUMENTED IN THE HPI. PHYSICAL EXAM:  Vital Signs: BP (!) 116/97   Pulse 66   Temp 98.5 °F (36.9 °C) (Oral)   Resp 18   Ht 5' 8\" (1.727 m)   Wt 174 lb (78.9 kg)   SpO2 100%   BMI 26.46 kg/m²     General appearance: alert, appears stated age, cooperative, distracted and no distress  Head: Normocephalic, without obvious abnormality, atraumatic  Eyes: conjunctivae/corneas clear. PERRL, EOM's intact. Fundi benign. Ears: normal TM's and external ear canals both ears  Nose: Nares normal. Septum midline. Mucosa normal. No drainage or sinus tenderness. Throat: lips, mucosa, and tongue normal; teeth and gums normal  Neck: no adenopathy, no carotid bruit, no JVD, supple, symmetrical, trachea midline and thyroid not enlarged, symmetric, no tenderness/mass/nodules  Back: symmetric, no curvature. ROM normal. No CVA tenderness.   Lungs: clear to auscultation bilaterally  Heart: regular rate and rhythm, S1, S2 normal, no murmur, click, rub or gallop  Abdomen: soft, non-tender; bowel sounds normal; no masses,  no organomegaly  Extremities: extremities normal, atraumatic, no cyanosis or edema and Skin tear of the left elbow  Pulses: 2+ and symmetric  Skin: Skin color, texture, turgor normal. No rashes or lesions  Neurologic: And oriented to self but not to place or time, moving all extremities, parkinsonian tremors present, no other focal neurological deficits seen      DATA:  CBC with Differential:    Lab Results   Component Value Date    WBC 6.3 07/16/2021    RBC 3.42 07/16/2021    HGB 10.9 07/16/2021    HCT 34.9 07/16/2021    PLT 39 07/16/2021    .0 07/16/2021    MCH 31.9 07/16/2021    MCHC 31.2 07/16/2021    RDW 13.6 07/16/2021    SEGSPCT 63.4 07/16/2021    LYMPHOPCT 26.8 07/16/2021    MONOPCT 7.7 07/16/2021    BASOPCT 0.5 07/16/2021    MONOSABS 0.5 07/16/2021    LYMPHSABS 1.7 07/16/2021    EOSABS 0.1 07/16/2021    BASOSABS 0.0 07/16/2021    DIFFTYPE AUTOMATED DIFFERENTIAL 07/16/2021     CMP:    Lab Results   Component Value Date     07/16/2021    K 4.8 07/16/2021     07/16/2021    CO2 18 07/16/2021    BUN 31 07/16/2021    CREATININE 1.7 07/16/2021    GFRAA 35 07/16/2021    AGRATIO 1.6 06/22/2020    LABGLOM 29 07/16/2021    GLUCOSE 95 07/16/2021    PROT 6.2 07/16/2021    PROT 6.6 10/03/2012    LABALBU 3.5 07/16/2021    CALCIUM 9.3 07/16/2021    BILITOT 0.4 07/16/2021    ALKPHOS 91 07/16/2021    AST 11 07/16/2021    ALT 6 07/16/2021     Magnesium:  No results found for: MG  Phosphorus:  No results found for: PHOS  Troponin:  No results found for: TROPONINI  U/A:    Lab Results   Component Value Date    COLORU YELLOW 07/16/2021    PROTEINU NEGATIVE 07/16/2021    WBCUA 3 TO 6 07/16/2021    RBCUA NEGATIVE 07/16/2021    BACTERIA FEW 07/16/2021    CLARITYU SL HAZY 07/16/2021    SPECGRAV 1.006 07/16/2021    LEUKOCYTESUR SMALL 07/16/2021    UROBILINOGEN 0.2 07/16/2021    BILIRUBINUR NEGATIVE 07/16/2021    BLOODU NEGATIVE 07/16/2021       ASSESSMENT / PLAN:   Principal Problem:    AMS (altered mental status)  This is probably secondary to progression of her disease since extensive work-up has been unremarkable. We will however obtain ammonia level as well as X02 and folic acid levels.   Worse case scenario, will obtain MRI to rule out stroke which is doubtful since patient has no deficits. 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 bicarb to control acidosis. Avoid all nephrotoxins and renally dose all medications and maintain patient on renal diet. Active Problems:    Essential hypertension  Home Norvasc dose 10 mg daily resumed, will adjust medications to optimize blood pressure control      Mixed hyperlipidemia  Resume home dose of statin and follow-up lipid panel in the morning      Glaucoma of both eyes  Eye prescriptions resumed. Patient to follow-up with her ophthalmologist upon discharge      Major depressive disorder with single episode  Home medication regimen resumed, will involve psychiatrist to assess her for further changes to her medication regimen      Myelodysplastic syndrome (HCC) with thrombocytopenia ([facility] [address]  Maintained on SCDs for DVT prophylaxis, no heparin or Lovenox indicated to prevent bleeding. Dementia with behavioral disturbance ([facility] [address] / 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[facility] [address]  Continue on home medication regimen as ordered, psychiatry consult to follow with further recommendations. Skin tear of left elbow without complication  For skin tear on the left elbow to be covered with Band-Aid for now. Wound care nurse to follow tomorrow further recommendations. Resolved Problems:    * No resolved hospital problems. *    Will be having a family meeting with patient's  with reference to disposition since patient is likely back to her baseline. A.m. labs.       Electronically signed by Flaco Lima MD, MD.

## 2021-07-16 NOTE — ED PROVIDER NOTES
[address]      Pt Name: Olivia Davis  MRN: [id number]  [facility] 1935  Date of evaluation: 7/16/2021  Provider: Marylene Schlatter, MD    CHIEF COMPLAINT       Chief Complaint   Patient presents with    Altered Mental Status     squad was called out for altered mental status. Pt alert to place and time. Pt states she fell 1 or 2 days ago . Pt. has a skin tear to lt arm w/ some bruising  also c/o neck pain         HISTORY OF PRESENT ILLNESS      Olivia Davis is a 82 y.o. female who presents to the emergency department  for   Chief Complaint   Patient presents with    Altered Mental Status     squad was called out for altered mental status. Pt alert to place and time. Pt states she fell 1 or 2 days ago . Pt. has a skin tear to lt arm w/ some bruising  also c/o neck pain       71-year-old female presents for altered mental status. She presents with her  who provides collateral information. She lives at home with her . She does have a history of Parkinson's as well as dementia. Her  reports that she awoke this morning confused about how he was. She is brought to the emergency department for evaluation. No report of any recent infectious symptoms. She has not complained of any pain. She has not had any falls or injuries. No fever or chills. No cough or sputum production. In the emergency department, she is a bit emotionally labile. She is oriented to person and does recognize her  appropriately. She is moving extremity spontaneously. She denies any acute pain complaints. Nursing Notes, Triage Notes & Vital Signs were reviewed. REVIEW OF SYSTEMS    (2-9 systems for level 4, 10 or more for level 5)     Review of Systems   Constitutional: Negative for chills and fever. HENT: Negative for congestion, rhinorrhea and sore throat. Eyes: Negative for pain and discharge. Respiratory: Negative for cough and shortness of breath. Cardiovascular: Negative for chest pain and palpitations. Gastrointestinal: Negative for abdominal pain, nausea and vomiting. Endocrine: Negative for polydipsia and polyuria. Genitourinary: Negative for dysuria and flank pain. Musculoskeletal: Negative for back pain and neck pain. Skin: Negative for pallor and wound. Neurological: Negative for dizziness, facial asymmetry, light-headedness and headaches. Psychiatric/Behavioral: Positive for confusion. Except as noted above the remainder of the review of systems was reviewed and negative. PAST MEDICAL HISTORY     Past Medical History:   Diagnosis Date    Colonoscopy refused     Glaucoma     Dr. Ben Evans Hyperlipidemia     Hypertension     Major depressive disorder with single episode 9/13/2018    Patient has depression and agitation. Was started on the Lexapro.  Myelodysplastic syndrome ([address] 10/22/2018    Dx 10/2018 : Myelodysplastic syndrome with multilineage dysplasia Dr Chel Fontaine    Obesity     Status post total left knee replacement 4/27/2017    Pt had  Left TKA on 3/2017 in [facility] Dr Krysta Zimmer. Antibiotic prophylaxis recommended by ortho when needed.  Thrombocytopenia ([address] 9/23/2018    Patient is referred to hematologist       Prior to Admission medications    Medication Sig Start Date End Date Taking?  Authorizing Provider   LINZESS 290 MCG CAPS capsule TAKE 1 CAPSULE BY MOUTH EVERY DAY 4/1/21   Historical Provider, MD   [facility] SENNA PLUS 8.6-50 MG per tablet Take 1 tablet by mouth daily  4/1/21   Historical Provider, MD   amLODIPine (NORVASC) 10 MG tablet TAKE 1 TABLET DAILY 4/19/21   Nora Castro MD   losartan (COZAAR) 100 MG tablet TAKE 1 TABLET DAILY.       (DISCONTINUE LISINOPRIL) 4/19/21   Nora Castro MD   atorvastatin (LIPITOR) 10 MG tablet TAKE 1 TABLET DAILY 4/19/21   oNra Castro MD   lactulose (CHRONULAC) 10 GM/15ML solution Take 15 mLs by mouth daily 3/30/21   April COURTNEY Shelley CNP   pramoxine-zinc oxide in mineral oil (TUCKS HEMORRHOIDAL) 1-12.5 % OINT ointment Place rectally 3 times daily as needed (pain relief) 3/30/21   April COURTNEY Shelley CNP   clotrimazole-betamethasone (LOTRISONE) 1-0.05 % cream Apply topically 2 times daily. 1/5/21   German Bartlett MD   carbidopa-levodopa (SINEMET)  MG per tablet TAKE 1 (ONE) TABLET BY MOUTH 3 (THREE) TIMES A DAY . 8/21/20   Historical Provider, MD   tolterodine (DETROL LA) 4 MG extended release capsule Take 4 mg by mouth daily Dr. Schroeder Trina Provider, MD   QUEtiapine (SEROQUEL) 50 MG tablet Take 50 mg by mouth nightly    Historical Provider, MD   aspirin 81 MG tablet Take 81 mg by mouth daily. Historical Provider, MD   latanoprost (XALATAN) 0.005 % ophthalmic solution 1 drop nightly. Historical Provider, MD        Patient Active Problem List   Diagnosis    Essential hypertension    Mixed hyperlipidemia    Obesity    Glaucoma of both eyes    Major depressive disorder with single episode    Weight loss    Thrombocytopenia (HCC)    Bilateral low back pain with left-sided sciatica    Myelodysplastic syndrome (HCC)    Renal insufficiency    Hallucination    Parkinsonism (HCC)    Other megaloblastic anemias, not elsewhere classified    Other fatigue    Dementia with behavioral disturbance (HCC)    Other constipation         SURGICAL HISTORY       Past Surgical History:   Procedure Laterality Date    CATARACT REMOVAL Bilateral     CHOLECYSTECTOMY      HYSTERECTOMY Bilateral 1981    KNEE ARTHROSCOPY Left 2007    TOTAL KNEE ARTHROPLASTY Left 03/10/2017         CURRENT MEDICATIONS       Previous Medications    AMLODIPINE (NORVASC) 10 MG TABLET    TAKE 1 TABLET DAILY    ASPIRIN 81 MG TABLET    Take 81 mg by mouth daily.     ATORVASTATIN (LIPITOR) 10 MG TABLET    TAKE 1 TABLET DAILY    CARBIDOPA-LEVODOPA (SINEMET)  MG PER TABLET    TAKE 1 (ONE) TABLET BY MOUTH 3 (THREE) TIMES A DAY . CLOTRIMAZOLE-BETAMETHASONE (LOTRISONE) 1-0.05 % CREAM    Apply topically 2 times daily. [facility] SENNA PLUS 8.6-50 MG PER TABLET    Take 1 tablet by mouth daily     LACTULOSE (CHRONULAC) 10 GM/15ML SOLUTION    Take 15 mLs by mouth daily    LATANOPROST (XALATAN) 0.005 % OPHTHALMIC SOLUTION    1 drop nightly. LINZESS 290 MCG CAPS CAPSULE    TAKE 1 CAPSULE BY MOUTH EVERY DAY    LOSARTAN (COZAAR) 100 MG TABLET    TAKE 1 TABLET DAILY.       (DISCONTINUE LISINOPRIL)    PRAMOXINE-ZINC OXIDE IN MINERAL OIL (TUCKS HEMORRHOIDAL) 1-12.5 % OINT OINTMENT    Place rectally 3 times daily as needed (pain relief)    QUETIAPINE (SEROQUEL) 50 MG TABLET    Take 50 mg by mouth nightly    TOLTERODINE (DETROL LA) 4 MG EXTENDED RELEASE CAPSULE    Take 4 mg by mouth daily Dr. Chandan Isaac     Patient has no known allergies. FAMILY HISTORY     History reviewed. No pertinent family history. SOCIAL HISTORY       Social History     Socioeconomic History    Marital status:      Spouse name: None    Number of children: None    Years of education: None    Highest education level: None   Occupational History    None   Tobacco Use    Smoking status: Never Smoker    Smokeless tobacco: Never Used   Substance and Sexual Activity    Alcohol use: No     Alcohol/week: 0.0 standard drinks    Drug use: No    Sexual activity: Yes     Partners: Male   Other Topics Concern    None   Social History Narrative    None     Social Determinants of Health     Financial Resource Strain: Medium Risk    Difficulty of Paying Living Expenses: Somewhat hard   Food Insecurity: No Food Insecurity    Worried About Running Out of Food in the Last Year: Never true    Liya of Food in the Last Year: Never true   Transportation Needs: No Transportation Needs    Lack of Transportation (Medical): No    Lack of Transportation (Non-Medical):  No   Physical Activity:     Days of Exercise per Week:     Minutes of Exercise per Session:    Stress:     Feeling of Stress :    Social Connections:     Frequency of Communication with Friends and Family:     Frequency of Social Gatherings with Friends and Family:     Attends Nondenominational Services:     Active Member of Clubs or Organizations:     Attends Club or Organization Meetings:     Marital Status:    Intimate Partner Violence:     Fear of Current or Ex-Partner:     Emotionally Abused:     Physically Abused:     Sexually Abused:        SCREENINGS               PHYSICAL EXAM    (up to 7 for level 4, 8 or more for level 5)     ED Triage Vitals [07/16/21 1334]   BP Temp Temp Source Pulse Resp SpO2 Height Weight   (!) 116/97 98.5 °F (36.9 °C) Oral 66 18 100 % 5' 8\" (1.727 m) 174 lb (78.9 kg)       Physical Exam  Vitals reviewed. Constitutional:       Appearance: She is not ill-appearing or toxic-appearing. HENT:      Head: Normocephalic and atraumatic. Eyes:      General: No scleral icterus. Pupils: Pupils are equal, round, and reactive to light. Pupils are equal.   Neck:      Meningeal: Brudzinski's sign absent. Cardiovascular:      Rate and Rhythm: Normal rate. Heart sounds: No friction rub. No gallop. Pulmonary:      Effort: Pulmonary effort is normal. No respiratory distress. Chest:      Chest wall: No tenderness. Abdominal:      Palpations: Abdomen is soft. Tenderness: There is no abdominal tenderness. There is no guarding. Musculoskeletal:         General: No swelling or tenderness. Normal range of motion. Cervical back: Normal range of motion. Lymphadenopathy:      Cervical: No cervical adenopathy. Skin:     General: Skin is warm. Capillary Refill: Capillary refill takes less than 2 seconds. Coloration: Skin is not pale. Findings: No erythema. Neurological:      Mental Status: She is alert. She is confused.          DIAGNOSTIC RESULTS     Labs Reviewed   COMPREHENSIVE METABOLIC PANEL W/ REFLEX TO MG FOR LOW K - Abnormal; Notable for the following components:       Result Value    CO2 18 (*)     BUN 31 (*)     CREATININE 1.7 (*)     Total Protein 6.2 (*)     ALT 6 (*)     AST 11 (*)     GFR Non- 29 (*)     GFR  35 (*)     All other components within normal limits   URINALYSIS WITH MICROSCOPIC - Abnormal; Notable for the following components:    Cast Type NEGATIVE (*)     All other components within normal limits   CBC WITH AUTO DIFFERENTIAL - Abnormal; Notable for the following components:    RBC 3.42 (*)     Hemoglobin 10.9 (*)     Hematocrit 34.9 (*)     .0 (*)     MCH 31.9 (*)     MCHC 31.2 (*)     Platelets 39 (*)     MPV 12.2 (*)     Monocytes % 7.7 (*)     All other components within normal limits   CULTURE, URINE   POCT GLUCOSE        EKG    EKG is interpreted by me. EKG shows a rhythm at 60 bpm, there is a fair amount of artifact being a bit difficult to interpret, and appears to be sinus rhythm, axis appears to be nondeviated, normal requested single elevations or depressions, T waves are unremarkable, QRS duration of 76, QTc of 4-31. Final impression, nonspecific EKG. RADIOLOGY:     Non-plain film images such as CT, Ultrasound and MRI are read by the radiologist. Plain radiographic images are visualized and preliminarily interpreted by the emergency physician. Interpretation per the Radiologist below, if available at the time of this note:    CT HEAD WO CONTRAST   Final Result   No acute intracranial abnormality. XR CHEST PORTABLE   Final Result   No evidence of acute cardiopulmonary disease.                ED BEDSIDE ULTRASOUND:   Performed by ED Physician Lay Banks MD       LABS:  Labs Reviewed   COMPREHENSIVE METABOLIC PANEL W/ REFLEX TO MG FOR LOW K - Abnormal; Notable for the following components:       Result Value    CO2 18 (*)     BUN 31 (*)     CREATININE 1.7 (*)     Total Protein 6.2 (*)     ALT 6 (*)     AST 11 (*)     GFR Non- American 29 (*)     GFR  35 (*)     All other components within normal limits   URINALYSIS WITH MICROSCOPIC - Abnormal; Notable for the following components:    Cast Type NEGATIVE (*)     All other components within normal limits   CBC WITH AUTO DIFFERENTIAL - Abnormal; Notable for the following components:    RBC 3.42 (*)     Hemoglobin 10.9 (*)     Hematocrit 34.9 (*)     .0 (*)     MCH 31.9 (*)     MCHC 31.2 (*)     Platelets 39 (*)     MPV 12.2 (*)     Monocytes % 7.7 (*)     All other components within normal limits   CULTURE, URINE   POCT GLUCOSE       All other labs were within normal range or not returned as of this dictation. EMERGENCY DEPARTMENT COURSE and DIFFERENTIAL DIAGNOSIS/MDM:   Vitals:    Vitals:    07/16/21 1334   BP: (!) 116/97   Pulse: 66   Resp: 18   Temp: 98.5 °F (36.9 °C)   TempSrc: Oral   SpO2: 100%   Weight: 174 lb (78.9 kg)   Height: 5' 8\" (1.727 m)           MDM  Number of Diagnoses or Management Options  Altered mental status, unspecified altered mental status type  Hallucinations  Diagnosis management comments: 65-year-old female presents with altered mental status. She has a history of dementia and Parkinson's. She comes from home where she lives with her . Reportedly this morning she awoke and did not recognize her  and felt as if there was a strange man in the house. She was brought to the emergency department for evaluation. Her  is at bedside to provide collateral information. He denies that she has had any recent infectious symptoms. She denies any falls or injuries. In the emergency department she is oriented to self and does recognize her  appropriately. She presents with over unremarkable vitals. No signs of external trauma. Labs as well as CT head and chest x-ray are obtained. CT head is nonacute. Chest x-ray is nonacute. CT scan is nonacute. .  Her serum creatinine is at its baseline range.   I did speak further with her . He states that she has been having some hallucinations recently has been making statements about not wanting to live. Family is uncomfortable taking her home at this time. I do believe should benefit from hospitalization and behavioral health consultation. She will be admitted in stable condition. Her urinalysis is negative nitrates and small leukocyte esterase test 3-5 WBC and  4-5 squames. Given the level of epithelial cells, I do feel that the urine is contaminated. Urine cultures pending at this time. I will defer starting on any antibiotics. Amount and/or Complexity of Data Reviewed  Decide to obtain previous medical records or to obtain history from someone other than the patient: yes        -  Patient seen and evaluated in the emergency department. -  Triage and nursing notes reviewed and incorporated. -  Old chart records reviewed and incorporated. -  Work-up included:  See above  -  Results discussed with patient. CONSULTS:  None    PROCEDURES:  None performed unless otherwise noted below     Procedures        FINAL IMPRESSION      1. Altered mental status, unspecified altered mental status type    2. Hallucinations          DISPOSITION/PLAN   DISPOSITION Decision To Admit 07/16/2021 06:13:30 PM      PATIENT REFERRED TO:  No follow-up provider specified. DISCHARGE MEDICATIONS:  New Prescriptions    No medications on file       ED Provider Disposition Time  DISPOSITION Decision To Admit 07/16/2021 06:13:30 PM      Appropriate personal protective equipment was worn during the patient's evaluation. These included surgical, eye protection, surgical mask or in 95 respirator and gloves. The patient was also placed in a surgical mask for the prevention of possible spread of respiratory viral illnesses. The Patient was instructed to read the package inserts with any medication that was prescribed.   Major potential reactions and medication interactions were discussed. The Patient understands that there are numerous possible adverse reactions not covered. The patient was also instructed to arrange follow-up with his or her primary care provider for review of any pending labwork or incidental findings on any radiology results that were obtained. All efforts were made to discuss any incidental findings that require further monitoring. Controlled Substances Monitoring:     No flowsheet data found.     (Please note that portions of this note were completed with a voice recognition program.  Efforts were made to edit the dictations but occasionally words are mis-transcribed.)    Bud Arroyo MD (electronically signed)  Attending Emergency Physician           Bud Arroyo MD  07/16/21 1810       Bud Arroyo MD  07/16/21 9521

## 2021-07-17 LAB
ALBUMIN SERPL-MCNC: 3 GM/DL (ref 3.4–5)
ALP BLD-CCNC: 83 IU/L (ref 40–129)
ALT SERPL-CCNC: <5 U/L (ref 10–40)
ANION GAP SERPL CALCULATED.3IONS-SCNC: 7 MMOL/L (ref 4–16)
AST SERPL-CCNC: 9 IU/L (ref 15–37)
BASOPHILS ABSOLUTE: 0 K/CU MM
BASOPHILS RELATIVE PERCENT: 0.6 % (ref 0–1)
BILIRUB SERPL-MCNC: 0.3 MG/DL (ref 0–1)
BUN BLDV-MCNC: 31 MG/DL (ref 6–23)
CALCIUM SERPL-MCNC: 8.8 MG/DL (ref 8.3–10.6)
CHLORIDE BLD-SCNC: 109 MMOL/L (ref 99–110)
CO2: 24 MMOL/L (ref 21–32)
CREAT SERPL-MCNC: 1.6 MG/DL (ref 0.6–1.1)
DIFFERENTIAL TYPE: ABNORMAL
EOSINOPHILS ABSOLUTE: 0.2 K/CU MM
EOSINOPHILS RELATIVE PERCENT: 3.6 % (ref 0–3)
GFR AFRICAN AMERICAN: 37 ML/MIN/1.73M2
GFR NON-AFRICAN AMERICAN: 31 ML/MIN/1.73M2
GLUCOSE BLD-MCNC: 86 MG/DL (ref 70–99)
HCT VFR BLD CALC: 31.4 % (ref 37–47)
HEMOGLOBIN: 9.7 GM/DL (ref 12.5–16)
IMMATURE NEUTROPHIL %: 0.2 % (ref 0–0.43)
LACTATE: 0.5 MMOL/L (ref 0.4–2)
LYMPHOCYTES ABSOLUTE: 1.7 K/CU MM
LYMPHOCYTES RELATIVE PERCENT: 34.8 % (ref 24–44)
MCH RBC QN AUTO: 31.3 PG (ref 27–31)
MCHC RBC AUTO-ENTMCNC: 30.9 % (ref 32–36)
MCV RBC AUTO: 101.3 FL (ref 78–100)
MONOCYTES ABSOLUTE: 0.5 K/CU MM
MONOCYTES RELATIVE PERCENT: 9.8 % (ref 0–4)
PDW BLD-RTO: 13.9 % (ref 11.7–14.9)
PLATELET # BLD: 95 K/CU MM (ref 140–440)
PMV BLD AUTO: 12.6 FL (ref 7.5–11.1)
POTASSIUM SERPL-SCNC: 4.2 MMOL/L (ref 3.5–5.1)
RBC # BLD: 3.1 M/CU MM (ref 4.2–5.4)
SEGMENTED NEUTROPHILS ABSOLUTE COUNT: 2.6 K/CU MM
SEGMENTED NEUTROPHILS RELATIVE PERCENT: 51 % (ref 36–66)
SODIUM BLD-SCNC: 140 MMOL/L (ref 135–145)
TOTAL IMMATURE NEUTOROPHIL: 0.01 K/CU MM
TOTAL PROTEIN: 5.4 GM/DL (ref 6.4–8.2)
WBC # BLD: 5 K/CU MM (ref 4–10.5)

## 2021-07-17 PROCEDURE — 99203 OFFICE O/P NEW LOW 30 MIN: CPT | Performed by: NURSE PRACTITIONER

## 2021-07-17 PROCEDURE — 36415 COLL VENOUS BLD VENIPUNCTURE: CPT

## 2021-07-17 PROCEDURE — 83605 ASSAY OF LACTIC ACID: CPT

## 2021-07-17 PROCEDURE — 97530 THERAPEUTIC ACTIVITIES: CPT

## 2021-07-17 PROCEDURE — 99226 PR SBSQ OBSERVATION CARE/DAY 35 MINUTES: CPT | Performed by: INTERNAL MEDICINE

## 2021-07-17 PROCEDURE — 80053 COMPREHEN METABOLIC PANEL: CPT

## 2021-07-17 PROCEDURE — G0378 HOSPITAL OBSERVATION PER HR: HCPCS

## 2021-07-17 PROCEDURE — 6370000000 HC RX 637 (ALT 250 FOR IP): Performed by: NURSE PRACTITIONER

## 2021-07-17 PROCEDURE — 97162 PT EVAL MOD COMPLEX 30 MIN: CPT

## 2021-07-17 PROCEDURE — 94761 N-INVAS EAR/PLS OXIMETRY MLT: CPT

## 2021-07-17 PROCEDURE — 2580000003 HC RX 258: Performed by: INTERNAL MEDICINE

## 2021-07-17 PROCEDURE — 85025 COMPLETE CBC W/AUTO DIFF WBC: CPT

## 2021-07-17 PROCEDURE — 6370000000 HC RX 637 (ALT 250 FOR IP): Performed by: INTERNAL MEDICINE

## 2021-07-17 RX ORDER — SODIUM BICARBONATE 650 MG/1
650 TABLET ORAL 2 TIMES DAILY
Status: DISCONTINUED | OUTPATIENT
Start: 2021-07-17 | End: 2021-07-20 | Stop reason: HOSPADM

## 2021-07-17 RX ORDER — SENNA AND DOCUSATE SODIUM 50; 8.6 MG/1; MG/1
2 TABLET, FILM COATED ORAL 2 TIMES DAILY
Status: DISCONTINUED | OUTPATIENT
Start: 2021-07-17 | End: 2021-07-20 | Stop reason: HOSPADM

## 2021-07-17 RX ORDER — LACTULOSE 10 G/15ML
20 SOLUTION ORAL 2 TIMES DAILY
Status: DISCONTINUED | OUTPATIENT
Start: 2021-07-17 | End: 2021-07-20 | Stop reason: HOSPADM

## 2021-07-17 RX ORDER — RIVASTIGMINE 4.6 MG/24H
1 PATCH, EXTENDED RELEASE TRANSDERMAL DAILY
Status: DISCONTINUED | OUTPATIENT
Start: 2021-07-18 | End: 2021-07-20 | Stop reason: HOSPADM

## 2021-07-17 RX ORDER — POLYETHYLENE GLYCOL 3350 17 G/17G
17 POWDER, FOR SOLUTION ORAL 2 TIMES DAILY
Status: DISCONTINUED | OUTPATIENT
Start: 2021-07-17 | End: 2021-07-20 | Stop reason: HOSPADM

## 2021-07-17 RX ORDER — QUETIAPINE FUMARATE 25 MG/1
12.5 TABLET, FILM COATED ORAL DAILY
Status: DISCONTINUED | OUTPATIENT
Start: 2021-07-18 | End: 2021-07-20 | Stop reason: HOSPADM

## 2021-07-17 RX ADMIN — SODIUM CHLORIDE, PRESERVATIVE FREE 10 ML: 5 INJECTION INTRAVENOUS at 20:31

## 2021-07-17 RX ADMIN — DOCUSATE SODIUM 50 MG AND SENNOSIDES 8.6 MG 2 TABLET: 8.6; 5 TABLET, FILM COATED ORAL at 20:29

## 2021-07-17 RX ADMIN — DOCUSATE SODIUM 50 MG AND SENNOSIDES 8.6 MG 1 TABLET: 8.6; 5 TABLET, FILM COATED ORAL at 08:14

## 2021-07-17 RX ADMIN — ATORVASTATIN CALCIUM 10 MG: 10 TABLET, FILM COATED ORAL at 20:29

## 2021-07-17 RX ADMIN — LACTULOSE 20 G: 10 SOLUTION ORAL at 20:28

## 2021-07-17 RX ADMIN — CARBIDOPA AND LEVODOPA 1 TABLET: 25; 100 TABLET ORAL at 14:05

## 2021-07-17 RX ADMIN — CLOTRIMAZOLE AND BETAMETHASONE DIPROPIONATE: 10; .5 CREAM TOPICAL at 20:28

## 2021-07-17 RX ADMIN — CARBIDOPA AND LEVODOPA 1 TABLET: 25; 100 TABLET ORAL at 08:14

## 2021-07-17 RX ADMIN — LATANOPROST 1 DROP: 50 SOLUTION OPHTHALMIC at 20:28

## 2021-07-17 RX ADMIN — ASPIRIN 81 MG CHEWABLE TABLET 81 MG: 81 TABLET CHEWABLE at 08:14

## 2021-07-17 RX ADMIN — SODIUM BICARBONATE TAB 650 MG 650 MG: 650 TAB at 20:29

## 2021-07-17 RX ADMIN — TROSPIUM CHLORIDE 20 MG: 20 TABLET, FILM COATED ORAL at 20:29

## 2021-07-17 RX ADMIN — CARBIDOPA AND LEVODOPA 1 TABLET: 25; 100 TABLET ORAL at 20:29

## 2021-07-17 RX ADMIN — QUETIAPINE FUMARATE 75 MG: 50 TABLET ORAL at 20:29

## 2021-07-17 RX ADMIN — POLYETHYLENE GLYCOL 3350 17 G: 17 POWDER, FOR SOLUTION ORAL at 20:28

## 2021-07-17 RX ADMIN — AMLODIPINE BESYLATE 10 MG: 10 TABLET ORAL at 08:14

## 2021-07-17 ASSESSMENT — PAIN SCALES - GENERAL
PAINLEVEL_OUTOF10: 0
PAINLEVEL_OUTOF10: 2
PAINLEVEL_OUTOF10: 0
PAINLEVEL_OUTOF10: 2
PAINLEVEL_OUTOF10: 0

## 2021-07-17 NOTE — PROGRESS NOTES
Physical Therapy    Facility/Department: [facility] UNIT  Initial Assessment    NAME: Jackson Alonso  : 1935  MRN: [id number]    Date of Service: 2021    Discharge Recommendations:  Continue to assess pending progress, 24 hour supervision or assist, Subacute/Skilled Nursing Facility, ECF with PT      Assessment   Body structures, Functions, Activity limitations: Decreased functional mobility ; Decreased endurance;Decreased coordination;Decreased ADL status; Decreased ROM; Decreased balance;Decreased strength;Decreased vision/visual deficit; Decreased posture;Decreased safe awareness;Decreased high-level IADLs;Decreased cognition  Assessment: Pt is a pleasant 81 yo female who would benefit from skilled PT to address decreased ROM, strength, balance, and functional mobility. Prognosis: Good  Decision Making: Medium Complexity  History: see below  Exam: see below  Clinical Presentation: evolving  PT Education: General Safety;PT Role;Gait Training;Functional Mobility Training; Injury Prevention;Transfer Training; Adaptive Device Training  Barriers to Learning: cognition  REQUIRES PT FOLLOW UP: Yes  Activity Tolerance  Activity Tolerance: Patient Tolerated treatment well;Patient limited by cognitive status       Patient Diagnosis(es): The primary encounter diagnosis was Altered mental status, unspecified altered mental status type. A diagnosis of Hallucinations was also pertinent to this visit. has a past medical history of Colonoscopy refused, Glaucoma, Hyperlipidemia, Hypertension, Major depressive disorder with single episode, Myelodysplastic syndrome ([address] Obesity, Status post total left knee replacement, and Thrombocytopenia ([address] has a past surgical history that includes Cholecystectomy; Hysterectomy (Bilateral, ); Knee arthroscopy (Left, );  Cataract removal (Bilateral); and Total knee arthroplasty (Left, 03/10/2017). Restrictions  Restrictions/Precautions  Restrictions/Precautions: Fall Risk  Vision/Hearing  Vision: Impaired  Vision Exceptions: Wears glasses at all times  Hearing: Within functional limits     Subjective  General  Chart Reviewed: Yes  Patient assessed for rehabilitation services?: Yes  Family / Caregiver Present: No  Follows Commands: Within Functional Limits  General Comment  Comments: pt presented supine in bed with HOB slightly elevated. Subjective  Subjective: pt reports her L knee hurts where she hurt it in her fall. Pt indicated her lateral knee with some bruising and swelling. Pain Screening  Patient Currently in Pain: Yes  Pain Assessment  Pain Assessment: 0-10  Pain Level: 2  Vital Signs  Patient Currently in Pain: Yes       Orientation  Orientation  Overall Orientation Status: Impaired  Orientation Level: Oriented to person (pt reports her birthday is [birth year]  Social/Functional History  Social/Functional History  Lives With: Spouse  Type of Home: House  Home Layout: Two level, Able to Live on Main level with bedroom/bathroom  Home Access:  (pt unable to answer question definitively)  Bathroom Shower/Tub: Walk-in shower  Bathroom Toilet: Handicap height  Bathroom Equipment: Grab bars in shower, Shower chair, Grab bars around toilet  Home Equipment: [address] [facility] Help From: Family  ADL Assistance: Needs assistance (pt reports she is usually able to bathe herself but someone is always there with her. She reports her daughters or her  A)  Homemaking Responsibilities: No  Ambulation Assistance: Needs assistance (pt reports she usually uses a RW)  Active : No  IADL Comments: Pt reports that she has A from family.   Additional Comments: pt is a questionable historian and no family present during the eval to A  Cognition    impaired    Objective     Observation/Palpation  Posture: Fair  Observation: pt presented in bed  Edema: pt demonstrated bruising and edema in L UE and L lateral knee appeared to have older bruising. AROM RLE (degrees)  RLE AROM: Exceptions  RLE General AROM: slightly decreased hip flexion and knee ext  AROM LLE (degrees)  LLE AROM : Exceptions  LLE General AROM: slightly decreased hip flexion and knee ext  Strength RLE  Strength RLE: Exception  R Hip Flexion: 3+/5  R Knee Flexion: 3+/5  R Knee Extension: 3+/5  Strength LLE  Strength LLE: Exception  L Hip Flexion: 3/5  L Knee Flexion: 3/5  L Knee Extension: 3/5  Tone RLE  RLE Tone: Normotonic  Tone LLE  LLE Tone: Normotonic  Motor Control  Gross Motor?: Exceptions  Comments: pt demonstrated some UE tremors. Bed mobility  Supine to Sit: Minimal assistance  Scooting: Minimal assistance  Transfers  Sit to Stand: Minimal Assistance (max cues for hand placement that pt was unable to follow)  Stand to sit: Minimal Assistance (cues for hand placement with improved following in chair vs commode/bed)  Comment: Pt transferred from bed to commode, commode to bed to don depends with a pad inside, and then stood again from EOB, stood at the sink to wash her hands with cues to walk all the way up to the sink and to perform the task. Then pt ambualted and transferred into the chair. Ambulation  Ambulation?: Yes  WB Status: FWB  Ambulation 1  Surface: level tile  Device: Rolling Walker  Assistance: Contact guard assistance;Minimal assistance  Quality of Gait: Pt demonstrated decreased speed, step length, and decreased foot clearance, with 1 small LOB backward requiring PT A to correct. Pt demonstrated diffiuclty with safety awareness, hand placement during transfrs, and managing walker during turns. She reuqired min A to turn walker and herself.  Pt consistently ran into objects on the L  Distance: 3'x2, and 12'  Stairs/Curb  Stairs?: No     Balance  Posture: Fair (pt demonstrated a flexed posture.)  Sitting - Static: Fair  Sitting - Dynamic: Poor  Standing - Static: Fair;-  Standing - Dynamic: Poor        Plan   Plan  Times per week: Mon-Sat 4x/week  Current Treatment Recommendations: Strengthening, IADL Training, Neuromuscular Re-education, Home Exercise Program, ROM, Safety Education & Training, Cognitive/Perceptual Training, Balance Training, Endurance Training, Patient/Caregiver Education & Training, Functional Mobility Training, Equipment Evaluation, Education, & procurement, Transfer Training, Gait Training, Cognitive Reorientation, ADL/Self-care Training, Stair training, Positioning (ther ex, ther act, bed mobility)  Safety Devices  Type of devices: Left in chair, Call light within reach, Nurse notified, Chair alarm in place    AM-PAC Score         Basic Mobility Six [facility] Score Conversion Table   How much difficulty does the patient currently have Unable   (pt is unable to do activity) A Lot   (activity is a struggle, requires great effort/time) A Little   (pt can manage, but takes more effort/time than should) None   (pt has no difficulty) Raw Score Standardized Score CMS -100% Score CMS Modifier        6 23.55 100% CN   Turning over in bed (including adjusting bedclothes, sheets, and blankets)? []1 []2  [x]3  []4  7 26.42 92.36% CM        8 28.58 86.62% CM   Sitting down on and standing up from a chair with arms (e.g. wheelchair, bedside commode, etc.)? []1 [x]2 []3   []4   9 30.55 81.38% CM        10 32.29 76.75% CL   Moving from lying on back to sitting on the side of the bed? []1 [x]2  []3   []4   11 33.86 72.57% CL        12 35.33 68.66% CL   How much help from another person does the patient currently need Total   (Total/Dependent Assist) A Lot   (Max/Mod Assist) A Little   (Min/CGA/Supervision) None   (No human assistance) 13 36.74 64.91% CL        14 38.1 61.29% CL   Moving to and from a bed to a chair (including a wheelchair)? []1  []2   [x]3  []4   15 39.45 57.70% CK        16 40.78 54.16% CK   To walk in a hospital room?  []1 []2   [x]3    []4  17 42.13 50.57% CK        18 43.63

## 2021-07-17 NOTE — PLAN OF CARE
Problem: Pain:  Goal: Pain level will decrease  Description: Pain level will decrease  Outcome: Met This Shift  Goal: Control of acute pain  Description: Control of acute pain  Outcome: Met This Shift  Goal: Control of chronic pain  Description: Control of chronic pain  Outcome: Met This Shift     Problem: Falls - Risk of:  Goal: Will remain free from falls  Description: Will remain free from falls  Outcome: Ongoing  Goal: Absence of physical injury  Description: Absence of physical injury  Outcome: Ongoing     Problem: Skin Integrity:  Goal: Will show no infection signs and symptoms  Description: Will show no infection signs and symptoms  Outcome: Ongoing  Goal: Absence of new skin breakdown  Description: Absence of new skin breakdown  Outcome: Ongoing     Problem: Discharge Planning:  Goal: Discharged to appropriate level of care  Description: Discharged to appropriate level of care  Outcome: Ongoing     Problem: Health Behavior:  Goal: Identification of resources available to assist in meeting health care needs will improve  Description: Identification of resources available to assist in meeting health care needs will improve  Outcome: Ongoing     Problem: Physical Regulation:  Goal: Complications related to the disease process, condition or treatment will be avoided or minimized  Description: Complications related to the disease process, condition or treatment will be avoided or minimized  Outcome: Ongoing  Goal: Compliance with treatment plan for underlying cause of condition will improve  Description: Compliance with treatment plan for underlying cause of condition will improve  Outcome: Ongoing     Problem: Self-Care:  Goal: Ability to participate in self-care as condition permits will improve  Description: Ability to participate in self-care as condition permits will improve  Outcome: Ongoing

## 2021-07-17 NOTE — CONSULTS
Interval 406 ms    QTc Calculation (Bazett) 431 ms    R Axis 2 degrees    T Axis 36 degrees    Diagnosis       Accelerated Junctional rhythm  Abnormal ECG  No previous ECGs available     POCT Glucose    Collection Time: 07/16/21  1:43 PM   Result Value Ref Range    POC Glucose 89 70 - 99 MG/DL   Comprehensive Metabolic Panel w/ Reflex to MG    Collection Time: 07/16/21  2:15 PM   Result Value Ref Range    Sodium 139 135 - 145 MMOL/L    Potassium 4.8 3.5 - 5.1 MMOL/L    Chloride 105 99 - 110 mMol/L    CO2 18 (L) 21 - 32 MMOL/L    BUN 31 (H) 6 - 23 MG/DL    CREATININE 1.7 (H) 0.6 - 1.1 MG/DL    Glucose 95 70 - 99 MG/DL    Calcium 9.3 8.3 - 10.6 MG/DL    Albumin 3.5 3.4 - 5.0 GM/DL    Total Protein 6.2 (L) 6.4 - 8.2 GM/DL    Total Bilirubin 0.4 0.0 - 1.0 MG/DL    ALT 6 (L) 10 - 40 U/L    AST 11 (L) 15 - 37 IU/L    Alkaline Phosphatase 91 40 - 129 IU/L    GFR Non- 29 (L) >60 mL/min/1.73m2    GFR  35 (L) >60 mL/min/1.73m2    Anion Gap 16 4 - 16   Urinalysis with microscopic    Collection Time: 07/16/21  2:15 PM   Result Value Ref Range    Color, UA YELLOW YELLOW    Clarity, UA SL HAZY CLEAR    Glucose, Urine NEGATIVE NEGATIVE MG/DL    Bilirubin Urine NEGATIVE NEGATIVE MG/DL    Ketones, Urine NEGATIVE NEGATIVE MG/DL    Specific Gravity, UA 1.006 1.001 - 1.035    Blood, Urine NEGATIVE NEGATIVE    pH, Urine 6.5 5.0 - 8.0    Protein, UA NEGATIVE NEGATIVE MG/DL    Urobilinogen, Urine 0.2 0.2 - 1.0 MG/DL    Nitrite Urine, Quantitative NEGATIVE NEGATIVE    Leukocyte Esterase, Urine SMALL NEGATIVE    RBC, UA NEGATIVE 0 - 6 /HPF    WBC, UA 3 TO 6 0 - 5 /HPF    Epithelial Cells, UA 3 TO 5 /HPF    Cast Type NEGATIVE (A) NO CAST FORMS SEEN /HPF    Bacteria, UA FEW NEGATIVE /HPF    Crystal Type NEGATIVE NEGATIVE /HPF   CBC Auto Differential    Collection Time: 07/16/21  2:20 PM   Result Value Ref Range    WBC 6.3 4.0 - 10.5 K/CU MM    RBC 3.42 (L) 4.2 - 5.4 M/CU MM    Hemoglobin 10.9 (L) 12.5 - 16.0 GM/DL    Hematocrit 34.9 (L) 37 - 47 %    .0 (H) 78 - 100 FL    MCH 31.9 (H) 27 - 31 PG    MCHC 31.2 (L) 32.0 - 36.0 %    RDW 13.6 11.7 - 14.9 %    Platelets 39 (L) 224 - 440 K/CU MM    MPV 12.2 (H) 7.5 - 11.1 FL    Differential Type AUTOMATED DIFFERENTIAL     Segs Relative 63.4 36 - 66 %    Lymphocytes % 26.8 24 - 44 %    Monocytes % 7.7 (H) 0 - 4 %    Eosinophils % 1.4 0 - 3 %    Basophils % 0.5 0 - 1 %    Segs Absolute 4.0 K/CU MM    Lymphocytes Absolute 1.7 K/CU MM    Monocytes Absolute 0.5 K/CU MM    Eosinophils Absolute 0.1 K/CU MM    Basophils Absolute 0.0 K/CU MM    Immature Neutrophil % 0.2 0 - 0.43 %    Total Immature Neutrophil 0.01 K/CU MM   Ammonia    Collection Time: 07/16/21  8:00 PM   Result Value Ref Range    Ammonia 11 11 - 51 UMOL/L   Iron and TIBC    Collection Time: 07/16/21  9:00 PM   Result Value Ref Range    Iron 51 37 - 145 ug/dL    UIBC 133 110 - 370 ug/dL    TIBC 184 (L) 250 - 450 ug/dL    Transferrin % 28 10 - 44 %   Ferritin    Collection Time: 07/16/21  9:00 PM   Result Value Ref Range    Ferritin 263 (H) 15 - 150 NG/ML   Reticulocytes    Collection Time: 07/16/21  9:00 PM   Result Value Ref Range    Retic Ct Pct 1.1 0.2 - 2.20 %   Vitamin B12 & folate    Collection Time: 07/16/21  9:00 PM   Result Value Ref Range    Vitamin B-12 498.9 211 - 911 pg/ml    Folate 6.4 3.1 - 17.5 NG/ML   CBC auto differential    Collection Time: 07/17/21  5:50 AM   Result Value Ref Range    WBC 5.0 4.0 - 10.5 K/CU MM    RBC 3.10 (L) 4.2 - 5.4 M/CU MM    Hemoglobin 9.7 (L) 12.5 - 16.0 GM/DL    Hematocrit 31.4 (L) 37 - 47 %    .3 (H) 78 - 100 FL    MCH 31.3 (H) 27 - 31 PG    MCHC 30.9 (L) 32.0 - 36.0 %    RDW 13.9 11.7 - 14.9 %    Platelets 95 (L) 576 - 440 K/CU MM    MPV 12.6 (H) 7.5 - 11.1 FL    Differential Type AUTOMATED DIFFERENTIAL     Segs Relative 51.0 36 - 66 %    Lymphocytes % 34.8 24 - 44 %    Monocytes % 9.8 (H) 0 - 4 %    Eosinophils % 3.6 (H) 0 - 3 %    Basophils % 0.6 0 - 1 % Segs Absolute 2.6 K/CU MM    Lymphocytes Absolute 1.7 K/CU MM    Monocytes Absolute 0.5 K/CU MM    Eosinophils Absolute 0.2 K/CU MM    Basophils Absolute 0.0 K/CU MM    Immature Neutrophil % 0.2 0 - 0.43 %    Total Immature Neutrophil 0.01 K/CU MM   Comprehensive Metabolic Panel w/ Reflex to MG    Collection Time: 07/17/21  5:50 AM   Result Value Ref Range    Sodium 140 135 - 145 MMOL/L    Potassium 4.2 3.5 - 5.1 MMOL/L    Chloride 109 99 - 110 mMol/L    CO2 24 21 - 32 MMOL/L    BUN 31 (H) 6 - 23 MG/DL    CREATININE 1.6 (H) 0.6 - 1.1 MG/DL    Glucose 86 70 - 99 MG/DL    Calcium 8.8 8.3 - 10.6 MG/DL    Albumin 3.0 (L) 3.4 - 5.0 GM/DL    Total Protein 5.4 (L) 6.4 - 8.2 GM/DL    Total Bilirubin 0.3 0.0 - 1.0 MG/DL    ALT <5 (L) 10 - 40 U/L    AST 9 (L) 15 - 37 IU/L    Alkaline Phosphatase 83 40 - 129 IU/L    GFR Non- 31 (L) >60 mL/min/1.73m2    GFR  37 (L) >60 mL/min/1.73m2    Anion Gap 7 4 - 16   Lactic acid, plasma    Collection Time: 07/17/21  5:50 AM   Result Value Ref Range    Lactate 0.5 0.4 - 2.0 mMOL/L       Review of Systems:  Reports of no current cardiovascular, respiratory, gastrointestinal, genitourinary, integumentary, neurological, muscuoskeletal, or immunological symptoms today. PSYCHIATRIC: See HPI above.     PSYCHIATRIC EXAMINATION / MENTAL STATUS EXAM    CONSTITUTIONAL:    Vitals:   Vitals:    07/17/21 1220   BP:    Pulse:    Resp:    Temp:    SpO2: 98%      General appearance: [x] appears age, []  appears older than stated age,               [x]  adequately dressed and groomed, [] disheveled,               [x]  in no acute distress, [] appears mildly distressed, [] other           MUSCULOSKELETAL:   Gait:   [] normal, [] antalgic, [] unsteady, [] gait not evaluated   Station:             [x] erect, [x] sitting, [] recumbent, [] other        Strength/tone:  [x] muscle strength and tone appear consistent with age and condition     [] atrophy      [] abnormal movements  PSYCHIATRIC:    Relatedness:  [x] cooperative, [] guarded, [] indifferent, [] hostile,      [] sedated  Speech:  [x] normal prosody, [] pressured, [] decreased volume,    [] increased volume [] slurred [] slowed, [] delayed     [] echolalia, [] incoherent, [] stuttering   Eye contact:  [x] direct, [] fleeting , [] intense []  none  Kinetics:  [x] normal, [] increased, [] decreased  Mood:   [x] stable, [] depressed, [] anxious, [] irritable,     [] labile  [] euphoric   Affect:   [x] normal range, [] constricted, [] depressed , [] anxious,  [] angry, []  blunted     [] mood incongruent, [] blunted  [] restricted   Hallucinations:  [] denies, [] auditory,  [x] visual,  [] olfactory, [] tactile  Delusions:  [] none, [] grandiose,  [x] paranoid,  [] persecutory,  [] somatic,     [] bizarre  [] Latter day/spiritual    Preoccupations:   [] none, [] violence, [] obsessions, [x] other      Suicidal ideation  [x] denies, [] endorses  Homicidal ideation [x] denies, [] endorses  Thought process: [] logical , [x] circumstantial, [] tangential, [] EDILSON,     [] simplistic, [] disorganized  [] FOI  [] concrete  [] nonsensical    Thought Content: [] future oriented [] goal directed  [] self-harm, [] guilt,     [] hopelessness  [] obsessive  [x] superficial  [] preoccupation    Insight:   [] adequate , [] limited , [] impaired    Judgment:  [] adequate , [] limited  [x] impaired  Associations:              []  Logical and coherent , [x] loosening, [] disorganized   Attention and concentration:     [x] intact [] limited [] impaired , [] grossly impaired  Orientation:  [x] person, place,president     [] disoriented to:   time, situation   Memory:             [x] superficially intact, [] impaired       Vitals: Blood pressure (!) 147/75, pulse 70, temperature 97 °F (36.1 °C), temperature source Infrared, resp.  rate 16, height 5' 8\" (1.727 m), weight 162 lb 8 oz (73.7 kg), SpO2 98 %. CONSTITUTIONAL:    Appearance: appears stated age. alert and oriented to person, place, time & situation. no acute distress. Adequate grooming and hygeine. Good eye contact. No prominent physical abnormalities. Attitude: Manner is cooperative and pleasant  Motor: No psychomotor agitation, retardation or abnormal movements noted  Speech: Clearly articulated; normal rate, volume, tone & amount. Language: intact understanding and production  Mood: \"ok\"  Affect: euthymic, full range, non-labile, congruent with mood and content of speech  Thought Production: Spontaneous. Thought Form: Coherent, linear, logical & goal-directed. Noted tangentiality and circumstantiality. No flight of ideas or loosening of associations. Thought Content/Perceptions: No MAU, noted VH, denied auditory hallucinations noted paranoia delusion  Insight:impaired  Judgment-questionable  Memory: Immediate, recent, and remote appear intact, though not formally tested. Attention: maintained throughout interview  Fund of knowledge: Average  Gait/Balance: WNL/WNL    Impression:    Parkinsons dementia with psychosis    Problem List:   AMS (altered mental status)    Plan:  1. Recommend increase seroquel 12.5 mg   daily and then increase seroquel 75 mg  po at bedtime to address, hallucinations and paranoia. 2. Recommend starting exelon patch for memory  3. Recommend decreasing the sinemet to ER  Bid to decrease dopamine in order to decrease hallucinations. 4. If behaviors/hallucinations do not decrease patient may be candidate for SBU pending medical clearance. 5. Due to thrombocytopenia cannot use Depakote for agitation. IF   patient becomes more agitated could consider carbamazepine (but can also decrease platelets). Will continue to monitor patient. 6. For severe agitation would recommend haldol 5mg IM with ativan 1 mg IM with benadryl 25 mg IM q6 prn.  7. Psychiatry will follow loosely; please call if required sooner.   8. Thank you for this consult    PS to Dr Judd Aldana regarding recommendations    Electronically signed by COURTNEY Castorena CNP on 7/17/2021 at 2:11 PM

## 2021-07-17 NOTE — PROGRESS NOTES
Skin assessment completed by this nurse and Poonam Graves RN. Noted scattered bruising to bilateral legs and arms, Bruising to left knee and non pitting swelling to bilateral knees.

## 2021-07-17 NOTE — PLAN OF CARE
Problem: Falls - Risk of:  Goal: Will remain free from falls  Description: Will remain free from falls  Outcome: Ongoing  Goal: Absence of physical injury  Description: Absence of physical injury  Outcome: Ongoing     Problem: Skin Integrity:  Goal: Will show no infection signs and symptoms  Description: Will show no infection signs and symptoms  Outcome: Ongoing  Goal: Absence of new skin breakdown  Description: Absence of new skin breakdown  Outcome: Ongoing     Problem: Discharge Planning:  Goal: Discharged to appropriate level of care  Description: Discharged to appropriate level of care  Outcome: Ongoing     Problem: Health Behavior:  Goal: Identification of resources available to assist in meeting health care needs will improve  Description: Identification of resources available to assist in meeting health care needs will improve  Outcome: Ongoing     Problem: Physical Regulation:  Goal: Complications related to the disease process, condition or treatment will be avoided or minimized  Description: Complications related to the disease process, condition or treatment will be avoided or minimized  Outcome: Ongoing  Goal: Compliance with treatment plan for underlying cause of condition will improve  Description: Compliance with treatment plan for underlying cause of condition will improve  Outcome: Ongoing     Problem: Self-Care:  Goal: Ability to participate in self-care as condition permits will improve  Description: Ability to participate in self-care as condition permits will improve  Outcome: Ongoing

## 2021-07-17 NOTE — PROGRESS NOTES
ATTENDING PHYSICIAN'S PROGRESS NOTES    Patient:  Margarette Sharma      Unit/Bed:006/006-01    YOB: 1935    MRN: [id number]     Acct: [de-identified]     Admit date: 7/16/2021    Patient Seen, Chart, Consults notes, Labs, Radiology studies reviewed. SUBJECTIVE:   Day 1 of stay with change in mental status and currently she is awake, alert and oriented in no apparent distress and no agitation; and most recent (in last 24 hours) has been seen by psychiatry consult with recommendations noted and well appreciated. Patient's  was at the bedside and he gave a detailed history about patient's presentation. All other ROS negative except noted in HPI    Past, Family, Social History unchanged from admission. Diet:  ADULT DIET; Regular; No Added Salt (3-4 gm); Low Potassium (Less than 3000 mg/day); Low Phosphorus (Less than 1000 mg);  Low Fiber; 2000 ml    Medications:  Scheduled Meds:   [START ON 7/18/2021] rivastigmine  1 patch Transdermal Daily    QUEtiapine  75 mg Oral Nightly    [START ON 7/18/2021] QUEtiapine  12.5 mg Oral Daily    amLODIPine  10 mg Oral Daily    aspirin  81 mg Oral Daily    atorvastatin  10 mg Oral Daily    carbidopa-levodopa  1 tablet Oral TID    clotrimazole-betamethasone   Topical BID    sennosides-docusate sodium  1 tablet Oral Daily    lactulose  10 g Oral Daily    latanoprost  1 drop Both Eyes Nightly    linaclotide  290 mcg Oral QAM AC    sodium chloride flush  5-40 mL Intravenous 2 times per day    trospium  20 mg Oral Nightly     Continuous Infusions:   sodium chloride      sodium bicarbonate infusion 50 mL/hr at 07/17/21 0056     PRN Meds:pramox-PE-glycerin-petrolatum, sodium chloride flush, sodium chloride, ondansetron **OR** ondansetron, polyethylene glycol, acetaminophen **OR** acetaminophen    OBJECTIVE:    CBC:   Recent Labs     07/16/21  1420 07/17/21  0550   WBC 6.3 5.0   HGB 10.9* 9.7*   PLT 39* 95*     BMP:    Recent Labs 07/16/21  1415 07/17/21  0550    140   K 4.8 4.2    109   CO2 18* 24   BUN 31* 31*   CREATININE 1.7* 1.6*   GLUCOSE 95 86     Calcium:  Recent Labs     07/17/21  0550   CALCIUM 8.8     Ionized Calcium:No results for input(s): IONCA in the last 72 hours. Magnesium:No results for input(s): MG in the last 72 hours. Phosphorus:No results for input(s): PHOS in the last 72 hours. BNP:No results for input(s): BNP in the last 72 hours. Glucose:  Recent Labs     07/16/21  1343   POCGLU 89     HgbA1C: No results for input(s): LABA1C in the last 72 hours. INR: No results for input(s): INR in the last 72 hours. Hepatic:   Recent Labs     07/17/21  0550   ALKPHOS 83   ALT <5*   AST 9*   PROT 5.4*   BILITOT 0.3   LABALBU 3.0*     Amylase and Lipase:No results for input(s): LACTA, AMYLASE in the last 72 hours. Lactic Acid: No results for input(s): LACTA in the last 72 hours. Troponin: No results for input(s): CKTOTAL, CKMB, TROPONINT in the last 72 hours. BNP: No results for input(s): BNP in the last 72 hours. Lipids: No results for input(s): CHOL, TRIG, HDL, LDLCALC in the last 72 hours. Invalid input(s): LDL  ABGs: No results found for: PH, PCO2, PO2, HCO3, O2SAT    Radiology reports as per the Radiologist  Radiology: CT HEAD WO CONTRAST    Result Date: 7/16/2021  EXAMINATION: CT OF THE HEAD WITHOUT CONTRAST  7/16/2021 2:11 pm TECHNIQUE: CT of the head was performed without the administration of intravenous contrast. Dose modulation, iterative reconstruction, and/or weight based adjustment of the mA/kV was utilized to reduce the radiation dose to as low as reasonably achievable. COMPARISON: 10/01/2019 HISTORY: ORDERING SYSTEM PROVIDED HISTORY: altered mental status TECHNOLOGIST PROVIDED HISTORY: Reason for exam:->altered mental status Has a \"code stroke\" or \"stroke alert\" been called? ->No Decision Support Exception - unselect if not a suspected or confirmed emergency medical condition->Emergency Medical Condition (MA) Additional signs and symptoms: ams FINDINGS: BRAIN/VENTRICLES: There is no acute intracranial hemorrhage, mass effect or midline shift. No abnormal extra-axial fluid collection. The gray-white differentiation is maintained without evidence of an acute infarct. There is no evidence of hydrocephalus. ORBITS: The visualized portion of the orbits demonstrate no acute abnormality. SINUSES: The visualized paranasal sinuses and mastoid air cells demonstrate no acute abnormality. SOFT TISSUES/SKULL:  No acute abnormality of the visualized skull or soft tissues. No acute intracranial abnormality. CT CERVICAL SPINE WO CONTRAST    Result Date: 7/16/2021  EXAMINATION: CT OF THE CERVICAL SPINE WITHOUT CONTRAST 7/16/2021 8:31 pm TECHNIQUE: CT of the cervical spine was performed without the administration of intravenous contrast. Multiplanar reformatted images are provided for review. Dose modulation, iterative reconstruction, and/or weight based adjustment of the mA/kV was utilized to reduce the radiation dose to as low as reasonably achievable. COMPARISON: None. HISTORY: ORDERING SYSTEM PROVIDED HISTORY: fal, concern for traumatic injury TECHNOLOGIST PROVIDED HISTORY: Reason for exam:->fal, concern for traumatic injury Reason for Exam: fall Acuity: Acute Type of Exam: Initial Mechanism of Injury: fall Relevant Medical/Surgical History: fall FINDINGS: BONES/ALIGNMENT: There is no acute fracture or traumatic malalignment. DEGENERATIVE CHANGES: No significant degenerative changes. There is diffuse idiopathic skeletal hyperostosis. SOFT TISSUES: There is no prevertebral soft tissue swelling. No acute abnormality of the cervical spine.      XR CHEST PORTABLE    Result Date: 7/16/2021  EXAMINATION: ONE XRAY VIEW OF THE CHEST 7/16/2021 2:10 pm COMPARISON: September 21, 2018 HISTORY: ORDERING SYSTEM PROVIDED HISTORY: altered mental status, concern for infection TECHNOLOGIST PROVIDED HISTORY: Reason for exam:->altered mental status, concern for infection FINDINGS: No evidence of pneumonia, edema or other acute pulmonary process. No evidence of acute process of the cardiac or mediastinal structures. No evidence of pneumothorax or pleural effusion. No evidence of acute cardiopulmonary disease. Physical Exam:  Vitals: BP (!) 147/75   Pulse 70   Temp 97 °F (36.1 °C) (Infrared)   Resp 16   Ht 5' 8\" (1.727 m)   Wt 162 lb 8 oz (73.7 kg)   SpO2 98%   BMI 24.71 kg/m²   24 hour intake/output:    Intake/Output Summary (Last 24 hours) at 7/17/2021 1644  Last data filed at 7/17/2021 0900  Gross per 24 hour   Intake 469.2 ml   Output --   Net 469.2 ml     Last 3 weights: Wt Readings from Last 3 Encounters:   07/16/21 162 lb 8 oz (73.7 kg)   05/21/21 172 lb 6.4 oz (78.2 kg)   05/10/21 175 lb 6.4 oz (79.6 kg)       General appearance - oriented to person, place, and time and acyanotic, in no respiratory distress  HEENT: Normocephalic, Atraumatic, Conjuctiva pink, PERRL, Oral mucosa normal, Lips, teeth and gums normal, Trachea midline, Thyroid normal and No noted lymphadenopathy  Chest - clear to auscultation, no wheezes, rales or rhonchi, symmetric air entry  Cardiovascular - normal rate, regular rhythm, normal S1, S2, no murmurs, rubs, clicks or gallops  Abdomen - soft, nontender, nondistended, no masses or organomegaly   Neurological - Alert and oriented, Normal speech, No focal findings or movement disorder noted and Motor and sensory grossly normal bilaterally  Integumentary - Skin color, texture, turgor normal. No Rashes or lesions  Musculoskeletal -Full ROM times 4 extremities, No clubbing or cyanosis and No peripheral edema      DVT prophylaxis: [] Lovenox                                 [] SCDs                                 [] SQ Heparin                                 [x] Encourage ambulation           [] Already on Anticoagulation                 ASSESSMENT / PLAN :    Principal Problem:     AMS (altered mental status) with auditory and visual hallucinations  Work-up so far unremarkable. Patient seen by psychiatry consult and recommendations as noted. She is back to baseline alert and oriented with no agitation. We will continue to monitor closely. Parkinson's disease  Currently on home dose of Sinemet which has been decreased by psychiatry consult. Patient follows up with a neurologist Dr. Nikole Cristobal in [facility] and will be following up with him upon discharge.        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 bicarb to control acidosis. Avoid all nephrotoxins and renally dose all medications and maintain patient on renal diet.     Active Problems:    Essential hypertension  Home Norvasc dose 10 mg daily resumed, will adjust medications to optimize blood pressure control       Mixed hyperlipidemia  Resume home dose of statin and follow-up lipid panel in the morning       Glaucoma of both eyes  Eye prescriptions resumed. 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 Lately it increased from 44 yesterday to 95.       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       Skin tear of left elbow without complication  For skin tear on the left elbow to be covered with Band-Aid for now. Wound care nurse to follow tomorrow further recommendations.     Resolved Problems:    * No resolved hospital problems.  *      Electronically signed by Sandra Candelario MD on 7/17/2021 at 4:44 PM    [address] [facility]

## 2021-07-18 ENCOUNTER — APPOINTMENT (OUTPATIENT)
Dept: GENERAL RADIOLOGY | Age: 86
End: 2021-07-18
Payer: MEDICARE

## 2021-07-18 LAB
ALBUMIN SERPL-MCNC: 3.3 GM/DL (ref 3.4–5)
ANION GAP SERPL CALCULATED.3IONS-SCNC: 8 MMOL/L (ref 4–16)
BUN BLDV-MCNC: 28 MG/DL (ref 6–23)
CALCIUM SERPL-MCNC: 9 MG/DL (ref 8.3–10.6)
CHLORIDE BLD-SCNC: 108 MMOL/L (ref 99–110)
CO2: 25 MMOL/L (ref 21–32)
CREAT SERPL-MCNC: 1.5 MG/DL (ref 0.6–1.1)
CULTURE: NORMAL
EKG ATRIAL RATE: 68 BPM
EKG DIAGNOSIS: NORMAL
EKG Q-T INTERVAL: 406 MS
EKG QRS DURATION: 76 MS
EKG QTC CALCULATION (BAZETT): 431 MS
EKG R AXIS: 2 DEGREES
EKG T AXIS: 36 DEGREES
EKG VENTRICULAR RATE: 68 BPM
GFR AFRICAN AMERICAN: 40 ML/MIN/1.73M2
GFR NON-AFRICAN AMERICAN: 33 ML/MIN/1.73M2
GLUCOSE BLD-MCNC: 84 MG/DL (ref 70–99)
Lab: NORMAL
MAGNESIUM: 1.9 MG/DL (ref 1.8–2.4)
PHOSPHORUS: 3.6 MG/DL (ref 2.5–4.9)
POTASSIUM SERPL-SCNC: 3.8 MMOL/L (ref 3.5–5.1)
SODIUM BLD-SCNC: 141 MMOL/L (ref 135–145)
SPECIMEN: NORMAL

## 2021-07-18 PROCEDURE — 36415 COLL VENOUS BLD VENIPUNCTURE: CPT

## 2021-07-18 PROCEDURE — 93010 ELECTROCARDIOGRAM REPORT: CPT | Performed by: INTERNAL MEDICINE

## 2021-07-18 PROCEDURE — 2580000003 HC RX 258: Performed by: INTERNAL MEDICINE

## 2021-07-18 PROCEDURE — 6370000000 HC RX 637 (ALT 250 FOR IP): Performed by: INTERNAL MEDICINE

## 2021-07-18 PROCEDURE — 83735 ASSAY OF MAGNESIUM: CPT

## 2021-07-18 PROCEDURE — G0378 HOSPITAL OBSERVATION PER HR: HCPCS

## 2021-07-18 PROCEDURE — 99226 PR SBSQ OBSERVATION CARE/DAY 35 MINUTES: CPT | Performed by: INTERNAL MEDICINE

## 2021-07-18 PROCEDURE — 74018 RADEX ABDOMEN 1 VIEW: CPT

## 2021-07-18 PROCEDURE — 6370000000 HC RX 637 (ALT 250 FOR IP): Performed by: NURSE PRACTITIONER

## 2021-07-18 PROCEDURE — 80069 RENAL FUNCTION PANEL: CPT

## 2021-07-18 RX ORDER — HYDRALAZINE HYDROCHLORIDE 25 MG/1
25 TABLET, FILM COATED ORAL EVERY 8 HOURS SCHEDULED
Status: DISCONTINUED | OUTPATIENT
Start: 2021-07-18 | End: 2021-07-20 | Stop reason: HOSPADM

## 2021-07-18 RX ADMIN — QUETIAPINE FUMARATE 75 MG: 50 TABLET ORAL at 20:22

## 2021-07-18 RX ADMIN — CARBIDOPA AND LEVODOPA 1 TABLET: 25; 100 TABLET ORAL at 20:23

## 2021-07-18 RX ADMIN — HYDRALAZINE HYDROCHLORIDE 25 MG: 25 TABLET, FILM COATED ORAL at 20:23

## 2021-07-18 RX ADMIN — CLOTRIMAZOLE AND BETAMETHASONE DIPROPIONATE: 10; .5 CREAM TOPICAL at 20:24

## 2021-07-18 RX ADMIN — LACTULOSE 20 G: 10 SOLUTION ORAL at 08:42

## 2021-07-18 RX ADMIN — HYDRALAZINE HYDROCHLORIDE 25 MG: 25 TABLET, FILM COATED ORAL at 08:45

## 2021-07-18 RX ADMIN — HYDRALAZINE HYDROCHLORIDE 25 MG: 25 TABLET, FILM COATED ORAL at 14:26

## 2021-07-18 RX ADMIN — DOCUSATE SODIUM 50 MG AND SENNOSIDES 8.6 MG 2 TABLET: 8.6; 5 TABLET, FILM COATED ORAL at 08:43

## 2021-07-18 RX ADMIN — TROSPIUM CHLORIDE 20 MG: 20 TABLET, FILM COATED ORAL at 20:24

## 2021-07-18 RX ADMIN — LATANOPROST 1 DROP: 50 SOLUTION OPHTHALMIC at 20:24

## 2021-07-18 RX ADMIN — ASPIRIN 81 MG CHEWABLE TABLET 81 MG: 81 TABLET CHEWABLE at 08:43

## 2021-07-18 RX ADMIN — ATORVASTATIN CALCIUM 10 MG: 10 TABLET, FILM COATED ORAL at 20:23

## 2021-07-18 RX ADMIN — SODIUM BICARBONATE TAB 650 MG 650 MG: 650 TAB at 08:43

## 2021-07-18 RX ADMIN — SODIUM CHLORIDE, PRESERVATIVE FREE 10 ML: 5 INJECTION INTRAVENOUS at 08:43

## 2021-07-18 RX ADMIN — CARBIDOPA AND LEVODOPA 1 TABLET: 25; 100 TABLET ORAL at 08:43

## 2021-07-18 RX ADMIN — CARBIDOPA AND LEVODOPA 1 TABLET: 25; 100 TABLET ORAL at 14:26

## 2021-07-18 RX ADMIN — QUETIAPINE FUMARATE 12.5 MG: 25 TABLET ORAL at 08:43

## 2021-07-18 RX ADMIN — AMLODIPINE BESYLATE 10 MG: 10 TABLET ORAL at 08:43

## 2021-07-18 RX ADMIN — DOCUSATE SODIUM 50 MG AND SENNOSIDES 8.6 MG 2 TABLET: 8.6; 5 TABLET, FILM COATED ORAL at 20:24

## 2021-07-18 RX ADMIN — POLYETHYLENE GLYCOL 3350 17 G: 17 POWDER, FOR SOLUTION ORAL at 08:42

## 2021-07-18 RX ADMIN — POLYETHYLENE GLYCOL 3350 17 G: 17 POWDER, FOR SOLUTION ORAL at 20:22

## 2021-07-18 RX ADMIN — SODIUM BICARBONATE TAB 650 MG 650 MG: 650 TAB at 20:23

## 2021-07-18 RX ADMIN — SODIUM CHLORIDE, PRESERVATIVE FREE 10 ML: 5 INJECTION INTRAVENOUS at 20:37

## 2021-07-18 ASSESSMENT — PAIN SCALES - GENERAL
PAINLEVEL_OUTOF10: 0

## 2021-07-18 NOTE — PROGRESS NOTES
Pt refusing to take pills. Stating they are her husbands pills. This nurse tried to reorient pt, was unsuccessful. pt requested to talk to her  Gianna Colbert. This nurse doan pt  and let pt talk to .  was able to talk pt into taking her pills.

## 2021-07-19 ENCOUNTER — APPOINTMENT (OUTPATIENT)
Dept: GENERAL RADIOLOGY | Age: 86
End: 2021-07-19
Payer: MEDICARE

## 2021-07-19 LAB
ALBUMIN SERPL-MCNC: 3.2 GM/DL (ref 3.4–5)
ANION GAP SERPL CALCULATED.3IONS-SCNC: 4 MMOL/L (ref 4–16)
BASOPHILS ABSOLUTE: 0 K/CU MM
BASOPHILS RELATIVE PERCENT: 0.1 % (ref 0–1)
BUN BLDV-MCNC: 28 MG/DL (ref 6–23)
CALCIUM SERPL-MCNC: 8.9 MG/DL (ref 8.3–10.6)
CHLORIDE BLD-SCNC: 102 MMOL/L (ref 99–110)
CO2: 28 MMOL/L (ref 21–32)
CREAT SERPL-MCNC: 1.5 MG/DL (ref 0.6–1.1)
DIFFERENTIAL TYPE: ABNORMAL
EOSINOPHILS ABSOLUTE: 0.1 K/CU MM
EOSINOPHILS RELATIVE PERCENT: 1.9 % (ref 0–3)
GFR AFRICAN AMERICAN: 40 ML/MIN/1.73M2
GFR NON-AFRICAN AMERICAN: 33 ML/MIN/1.73M2
GLUCOSE BLD-MCNC: 86 MG/DL (ref 70–99)
HCT VFR BLD CALC: 32 % (ref 37–47)
HEMOGLOBIN: 9.9 GM/DL (ref 12.5–16)
IMMATURE NEUTROPHIL %: 0.3 % (ref 0–0.43)
LYMPHOCYTES ABSOLUTE: 1.5 K/CU MM
LYMPHOCYTES RELATIVE PERCENT: 21.1 % (ref 24–44)
MAGNESIUM: 1.9 MG/DL (ref 1.8–2.4)
MCH RBC QN AUTO: 31.6 PG (ref 27–31)
MCHC RBC AUTO-ENTMCNC: 30.9 % (ref 32–36)
MCV RBC AUTO: 102.2 FL (ref 78–100)
MONOCYTES ABSOLUTE: 0.7 K/CU MM
MONOCYTES RELATIVE PERCENT: 10 % (ref 0–4)
PDW BLD-RTO: 14.1 % (ref 11.7–14.9)
PHOSPHORUS: 3.5 MG/DL (ref 2.5–4.9)
PLATELET # BLD: 43 K/CU MM (ref 140–440)
PMV BLD AUTO: 12.7 FL (ref 7.5–11.1)
POTASSIUM SERPL-SCNC: 3.9 MMOL/L (ref 3.5–5.1)
RBC # BLD: 3.13 M/CU MM (ref 4.2–5.4)
SEGMENTED NEUTROPHILS ABSOLUTE COUNT: 4.9 K/CU MM
SEGMENTED NEUTROPHILS RELATIVE PERCENT: 66.6 % (ref 36–66)
SODIUM BLD-SCNC: 134 MMOL/L (ref 135–145)
TOTAL IMMATURE NEUTOROPHIL: 0.02 K/CU MM
WBC # BLD: 7.3 K/CU MM (ref 4–10.5)

## 2021-07-19 PROCEDURE — G0378 HOSPITAL OBSERVATION PER HR: HCPCS

## 2021-07-19 PROCEDURE — 85025 COMPLETE CBC W/AUTO DIFF WBC: CPT

## 2021-07-19 PROCEDURE — 97166 OT EVAL MOD COMPLEX 45 MIN: CPT

## 2021-07-19 PROCEDURE — 83735 ASSAY OF MAGNESIUM: CPT

## 2021-07-19 PROCEDURE — 97530 THERAPEUTIC ACTIVITIES: CPT

## 2021-07-19 PROCEDURE — 80069 RENAL FUNCTION PANEL: CPT

## 2021-07-19 PROCEDURE — 6370000000 HC RX 637 (ALT 250 FOR IP): Performed by: NURSE PRACTITIONER

## 2021-07-19 PROCEDURE — 99225 PR SBSQ OBSERVATION CARE/DAY 25 MINUTES: CPT | Performed by: INTERNAL MEDICINE

## 2021-07-19 PROCEDURE — 36415 COLL VENOUS BLD VENIPUNCTURE: CPT

## 2021-07-19 PROCEDURE — 6370000000 HC RX 637 (ALT 250 FOR IP): Performed by: INTERNAL MEDICINE

## 2021-07-19 PROCEDURE — 2580000003 HC RX 258: Performed by: INTERNAL MEDICINE

## 2021-07-19 PROCEDURE — 74018 RADEX ABDOMEN 1 VIEW: CPT

## 2021-07-19 RX ADMIN — DOCUSATE SODIUM 50 MG AND SENNOSIDES 8.6 MG 2 TABLET: 8.6; 5 TABLET, FILM COATED ORAL at 20:06

## 2021-07-19 RX ADMIN — CLOTRIMAZOLE AND BETAMETHASONE DIPROPIONATE: 10; .5 CREAM TOPICAL at 10:31

## 2021-07-19 RX ADMIN — LACTULOSE 20 G: 10 SOLUTION ORAL at 10:14

## 2021-07-19 RX ADMIN — CARBIDOPA AND LEVODOPA 1 TABLET: 25; 100 TABLET ORAL at 20:07

## 2021-07-19 RX ADMIN — QUETIAPINE FUMARATE 75 MG: 50 TABLET ORAL at 20:06

## 2021-07-19 RX ADMIN — CARBIDOPA AND LEVODOPA 1 TABLET: 25; 100 TABLET ORAL at 16:24

## 2021-07-19 RX ADMIN — QUETIAPINE FUMARATE 12.5 MG: 25 TABLET ORAL at 10:15

## 2021-07-19 RX ADMIN — TROSPIUM CHLORIDE 20 MG: 20 TABLET, FILM COATED ORAL at 20:07

## 2021-07-19 RX ADMIN — POLYETHYLENE GLYCOL 3350 17 G: 17 POWDER, FOR SOLUTION ORAL at 10:14

## 2021-07-19 RX ADMIN — ATORVASTATIN CALCIUM 10 MG: 10 TABLET, FILM COATED ORAL at 20:07

## 2021-07-19 RX ADMIN — CARBIDOPA AND LEVODOPA 1 TABLET: 25; 100 TABLET ORAL at 10:15

## 2021-07-19 RX ADMIN — SODIUM BICARBONATE TAB 650 MG 650 MG: 650 TAB at 20:07

## 2021-07-19 RX ADMIN — SODIUM CHLORIDE, PRESERVATIVE FREE 10 ML: 5 INJECTION INTRAVENOUS at 10:16

## 2021-07-19 RX ADMIN — AMLODIPINE BESYLATE 10 MG: 10 TABLET ORAL at 10:15

## 2021-07-19 RX ADMIN — POLYETHYLENE GLYCOL 3350 17 G: 17 POWDER, FOR SOLUTION ORAL at 20:08

## 2021-07-19 RX ADMIN — DOCUSATE SODIUM 50 MG AND SENNOSIDES 8.6 MG 2 TABLET: 8.6; 5 TABLET, FILM COATED ORAL at 10:19

## 2021-07-19 RX ADMIN — HYDRALAZINE HYDROCHLORIDE 25 MG: 25 TABLET, FILM COATED ORAL at 20:06

## 2021-07-19 RX ADMIN — LATANOPROST 1 DROP: 50 SOLUTION OPHTHALMIC at 20:07

## 2021-07-19 RX ADMIN — HYDRALAZINE HYDROCHLORIDE 25 MG: 25 TABLET, FILM COATED ORAL at 06:09

## 2021-07-19 RX ADMIN — CLOTRIMAZOLE AND BETAMETHASONE DIPROPIONATE: 10; .5 CREAM TOPICAL at 20:07

## 2021-07-19 RX ADMIN — SODIUM BICARBONATE TAB 650 MG 650 MG: 650 TAB at 10:16

## 2021-07-19 RX ADMIN — HYDRALAZINE HYDROCHLORIDE 25 MG: 25 TABLET, FILM COATED ORAL at 16:24

## 2021-07-19 RX ADMIN — ASPIRIN 81 MG CHEWABLE TABLET 81 MG: 81 TABLET CHEWABLE at 10:16

## 2021-07-19 RX ADMIN — SODIUM CHLORIDE, PRESERVATIVE FREE 10 ML: 5 INJECTION INTRAVENOUS at 21:00

## 2021-07-19 ASSESSMENT — PAIN SCALES - GENERAL: PAINLEVEL_OUTOF10: 0

## 2021-07-19 NOTE — CARE COORDINATION
CM met with the patient, her spouse Sydney Tan, and her daughter. Patient sitting in recliner resting quietly. CM advised that PT has recommended SNF placement, Fredy Ambrosio stated that he has spoke with his children and the family is in agreement with SNF placement. Fredy Ambrosio explained that a family member is employed with [facility] and they have already reached out to the facility and have been told beds are available. The patient's insurance will require pre-certification. CM will follow. 10:58 AM  CM spoke with Kashmir Soriano at [facility] to initiate referral.  CM will follow. OT evaluation is still pending at this time. CM will follow. 1 PM  PASRR completed online.

## 2021-07-19 NOTE — PROGRESS NOTES
Physical Therapy Treatment Note  Name: [place] Alonso MRN: [id number] :   1935   Date:  2021   Admission Date: 2021 Room:  [address] [place] [address]     Restrictions/Precautions:  Restrictions/Precautions  Restrictions/Precautions: Fall Risk         Communication with other providers:      Subjective:  Patient states: \"My shoulders are real sore, i'm so weak. \"   Pain:   Location, Type, Intensity (0/10 to 10/10): unable to rate     Objective:    Observation: Pt awake in bed, HOB elevated    Treatment, including education/measures:    Bed Mobility:           []   Pt received out of bed   Rolling R/L:    Scooting:  Min assist at hips for EOB  Lying --> Sit:  Max assist at trunk, max increased time to move LE's to EOB  Sit --> lying:      Transfers:    Sit--> Stand: min assist for steadying and lift   Stand --> Sit:   Min-mod assist for eccentric lowering to chair   Chair-->Bed/Bed --> Chair: Min assist   Car Transfers: Toilet Transfer (if applicable): Other:    Assistive device required for transfer:  FWW    Gait:    Distance: 3 ft (bed to chair)  Assistance: min assist for balance   Device:  FWW  Gait Quality:  Decreased step length bilaterally, decreased balance/postural control, poor walker management     Stairs   # Completed:  Not attempted  Assistance:    Supportive Device:      Therapeutic Activity Training: Pt requires increased assistance for bed mobility this session at hips and trunk to reach EOB. Once EOB patient is able to sit with B UE support on bed with close SBA. Pt is very unsteady in standing with FWW requiring at least min assist for balance with visible LE shaking/fatigue. Pt completes stand step transfer to chair with max verbal cues and up to mod assist for walker steering, turning hips, and lowering to sitting in chair. Pt left with all needs met, RN aware pt in chair with call light.      Therapeutic Exercise: none    Safety Measures: Gait belt used, Left in chair, Pull Alarm activated and call light left in reach    Assessment / Impression:       Patient's tolerance of treatment: Poor/fair   Adverse Reaction: none  Significant change in status and impact:  none  Barriers to improvement:  Weakness, fatigue, cognition    Plan for Next Session:  Transfers, strengthening    Time in: 0930  Time out:  0959  Timed treatment minutes:  29  Billed Units:  2 TA    Previously filed items:  Social/Functional History  Lives With: Spouse  Type of Home: House  Home Layout: Two level, Able to Live on Main level with bedroom/bathroom  Home Access:  (Pt was unable to answer)  Bathroom Shower/Tub: Walk-in shower  Bathroom Toilet: Handicap height  Bathroom Equipment: Grab bars in shower, Shower chair, Grab bars around toilet  Home Equipment: 4 wheeled walker  Receives Help From: Family  ADL Assistance: Needs assistance  Homemaking Responsibilities: No  Ambulation Assistance: Needs assistance (RW)  Transfer Assistance: Needs assistance  Active : No  IADL Comments: Pt states her  takes care of her  Additional Comments: pt is a questionable historian and no family present during the eval to A  Short term goals  Time Frame for Short term goals: 1 week or until discharge:  Short term goal 1: Pt will demonstrate the ability to safely perform bed mobility with supervision. Short term goal 2: Pt will demonstrate the ability to safely transfer sit to stand and stand to sit from 3 different height surfaces with proper hand placement with 1 cue per transfer. Short term goal 3: Pt will demonstrate the ability to safely ambulate 50 feet with no rest break and RW and CGA.        Electronically signed by:    Coco Zaldivar PT   07/19/21 1:07 PM

## 2021-07-19 NOTE — PROGRESS NOTES
Occupational Therapy   Occupational Therapy Initial Assessment  Date: 2021   Patient Name: Mireya Myers  MRN: [id number]     : 1935    Date of Service: 2021    Discharge Recommendations:  Subacute/Skilled Nursing Facility       Assessment   Performance deficits / Impairments: Decreased functional mobility ; Decreased ADL status; Decreased ROM; Decreased strength;Decreased safe awareness;Decreased high-level IADLs;Decreased balance;Decreased vision/visual deficit; Decreased endurance;Decreased coordination;Decreased posture;Decreased cognition  Assessment: 81 yo female admitted to the hospital with altered mental status. She presents today with decreased I adls, transfers, decreased AROM and strength, and impaired cognition. OT to see pt to maximize I with adls, transfers and improve orientation  Treatment Diagnosis: AMS  Prognosis: Fair  Decision Making: Medium Complexity  History: see above  Exam: see above  OT Education: OT Role;Transfer Training  REQUIRES OT FOLLOW UP: Yes  Activity Tolerance  Activity Tolerance: Patient limited by fatigue;Patient limited by pain;Treatment limited secondary to decreased cognition  Safety Devices  Safety Devices in place: Yes  Type of devices: Call light within reach; Chair alarm in place;Gait belt;Left in chair           Patient Diagnosis(es): The primary encounter diagnosis was Altered mental status, unspecified altered mental status type. A diagnosis of Hallucinations was also pertinent to this visit. has a past medical history of Colonoscopy refused, Glaucoma, Hyperlipidemia, Hypertension, Major depressive disorder with single episode, Myelodysplastic syndrome ([address] Obesity, Status post total left knee replacement, and Thrombocytopenia ([address] has a past surgical history that includes Cholecystectomy; Hysterectomy (Bilateral, ); Knee arthroscopy (Left, );  Cataract removal (Bilateral); and Total knee arthroplasty (Left, Dependent/Total  Tone RUE  RUE Tone:  (some increased tone)  Coordination  Coordination and Movement description: Fine motor impairments;Gross motor impairments;Right UE;Left UE  Quality of Movement Other  Comment: seemed to have some rigidity in RUE     Bed mobility  Supine to Sit: Maximum assistance  Scooting: Moderate assistance  Transfers  Sit to stand: Minimal assistance  Stand to sit: Contact guard assistance     Cognition  Overall Cognitive Status: Exceptions  Following Commands: Follows one step commands with increased time; Follows one step commands with repetition  Attention Span: Attends with cues to redirect  Memory: Decreased recall of biographical Information;Decreased recall of recent events;Decreased short term memory  Safety Judgement: Decreased awareness of need for safety  Problem Solving: Assistance required to generate solutions;Assistance required to implement solutions;Decreased awareness of errors;Assistance required to correct errors made;Assistance required to identify errors made  Insights: Decreased awareness of deficits  Initiation: Requires cues for all  Sequencing: Requires cues for all        Sensation  Overall Sensation Status: WFL        LUE AROM (degrees)  LUE General AROM: 0-110 shoulder flex/abd,  elbow, wrist wfl; Pt was slightly resistive so difficult to assess  RUE AROM (degrees)  RUE General AROM: 0-60 shoulder flex/ext(Pt did not move her shoulder much but uncertain how much was cognitive difficulties and fear, elbow extension impaired  LUE Strength  LUE Strength Comment: general weakness  RUE Strength  RUE Strength Comment: general weakness, some increased tone                   Plan   Plan  Times per week: 2+  Current Treatment Recommendations: Strengthening, ROM, Balance Training, Functional Mobility Training, Endurance Training, Self-Care / ADL, Safety Education & Training, Patient/Caregiver Education & Training, Cognitive Reorientation, Cognitive/Perceptual Training    G-Code     OutComes Score                                                  AM-PAC Score    AM-PAC 6 click short form for inpatient daily activity:   How much help from another person does the patient currently need. .. Unable  Dep A Lot  Max A A Lot   Mod A A Little  Min A A Little   CGA  SBA None   Mod I  Indep  Sup   1. Putting on and taking off regular lower body clothing? [x] 1    [] 2   [] 2   [] 3   [] 3   [] 4      2. Bathing (including washing, rinsing, drying)? [] 1   [x] 2   [] 2 [] 3 [] 3 [] 4   3. Toileting, which includes using toilet, bedpan, or urinal? [x] 1    [] 2   [] 2   [] 3   [] 3   [] 4     4. Putting on and taking off regular upper body clothing? [] 1   [] 2   [] 2   [x] 3   [] 3    [] 4      5. Taking care of personal grooming such as brushing teeth? [] 1   [] 2    [] 2 [x] 3    [] 3   [] 4      6. Eating meals? [] 1   [] 2   [] 2   [] 3   [x] 3   [] 4      Raw Score:14/24  60-79% impaired     [24=0% impaired(CH), 23=1-19%(CI), 20-22=20-39%(CJ), 15-19=40-59%(CK), 10-14=60-79%(CL), 7-9=80-99%(CM), 6=100%(CN)]            Goals  Short term goals  Time Frame for Short term goals: until discharge  Short term goal 1: Pt will be CGA/SBAfor functional adl transfers to the chair, toilet or bed w/o LOB or falls with min vc  Short term goal 2: Pt will be min A+ min vc for ub bathing and dressing/Mod A for LB adls using necessary a.e  Short term goal 3: Pt will be min vc for UB ROM and strengthening to assist with transfers and adls;  Core strengthening for balance and transfers  Patient Goals   Patient goals : She wants to be with her        Therapy Time   Individual Concurrent Group Co-treatment   Time In       0930   Time Out       0959   Minutes       29   Timed Code Treatment Minutes: [address] [facility], OT

## 2021-07-19 NOTE — PROGRESS NOTES
ATTENDING PHYSICIAN'S PROGRESS NOTES    Patient:  Tamia [facility]      Unit/Bed:006/006-01    YOB: 1935    MRN: [id number]     Acct: [de-identified]     Admit date: 7/16/2021    Patient Seen, Chart, Consults notes, Labs, Radiology studies reviewed. SUBJECTIVE:   Day 1 of stay with change in mental status and currently she is awake, alert and oriented in no apparent distress and no agitation; and most recent (in last 24 hours) has been seen by psychiatry consult with recommendations noted and well appreciated. Patient has also had multiple bowel movements after a single soapsuds enema together with the adjustment to her stool softeners and laxatives. Patient's  and daughter where at the bedside and we had a detailed discussion about patient's current status and disposition. All other ROS negative except noted in HPI    Past, Family, Social History unchanged from admission. Diet:  ADULT DIET; Regular; No Added Salt (3-4 gm); Low Potassium (Less than 3000 mg/day); Low Phosphorus (Less than 1000 mg);  Low Fiber; 2000 ml    Medications:  Scheduled Meds:   hydrALAZINE  25 mg Oral 3 times per day    rivastigmine  1 patch Transdermal Daily    QUEtiapine  75 mg Oral Nightly    QUEtiapine  12.5 mg Oral Daily    sodium bicarbonate  650 mg Oral BID    sennosides-docusate sodium  2 tablet Oral BID    lactulose  20 g Oral BID    polyethylene glycol  17 g Oral BID    amLODIPine  10 mg Oral Daily    aspirin  81 mg Oral Daily    atorvastatin  10 mg Oral Daily    carbidopa-levodopa  1 tablet Oral TID    clotrimazole-betamethasone   Topical BID    latanoprost  1 drop Both Eyes Nightly    linaclotide  290 mcg Oral QAM AC    sodium chloride flush  5-40 mL Intravenous 2 times per day    trospium  20 mg Oral Nightly     Continuous Infusions:   sodium chloride       PRN Meds:pramox-PE-glycerin-petrolatum, sodium chloride flush, sodium chloride, ondansetron **OR** ondansetron, acetaminophen **OR** acetaminophen    OBJECTIVE:    CBC:   Recent Labs     07/16/21  1420 07/17/21  0550 07/19/21  0600   WBC 6.3 5.0 7.3   HGB 10.9* 9.7* 9.9*   PLT 39* 95* 43*     BMP:    Recent Labs     07/17/21  0550 07/18/21  0800 07/19/21  0600    141 134*   K 4.2 3.8 3.9    108 102   CO2 24 25 28   BUN 31* 28* 28*   CREATININE 1.6* 1.5* 1.5*   GLUCOSE 86 84 86     Calcium:  Recent Labs     07/19/21  0600   CALCIUM 8.9     Ionized Calcium:No results for input(s): IONCA in the last 72 hours. Magnesium:  Recent Labs     07/19/21  0600   MG 1.9     Phosphorus:  Recent Labs     07/19/21  0600   PHOS 3.5     BNP:No results for input(s): BNP in the last 72 hours. Glucose:  Recent Labs     07/16/21  1343   POCGLU 89     HgbA1C: No results for input(s): LABA1C in the last 72 hours. INR: No results for input(s): INR in the last 72 hours. Hepatic:   Recent Labs     07/17/21  0550 07/18/21  0800 07/19/21  0600   ALKPHOS 83  --   --    ALT <5*  --   --    AST 9*  --   --    PROT 5.4*  --   --    BILITOT 0.3  --   --    LABALBU 3.0*   < > 3.2*    < > = values in this interval not displayed. Amylase and Lipase:No results for input(s): LACTA, AMYLASE in the last 72 hours. Lactic Acid: No results for input(s): LACTA in the last 72 hours. Troponin: No results for input(s): CKTOTAL, CKMB, TROPONINT in the last 72 hours. BNP: No results for input(s): BNP in the last 72 hours. Lipids: No results for input(s): CHOL, TRIG, HDL, LDLCALC in the last 72 hours.     Invalid input(s): LDL  ABGs: No results found for: PH, PCO2, PO2, HCO3, O2SAT    Radiology reports as per the Radiologist  Radiology: CT HEAD WO CONTRAST    Result Date: 7/16/2021  EXAMINATION: CT OF THE HEAD WITHOUT CONTRAST  7/16/2021 2:11 pm TECHNIQUE: CT of the head was performed without the administration of intravenous contrast. Dose modulation, iterative reconstruction, and/or weight based adjustment of the mA/kV was utilized to reduce the radiation dose to as low as reasonably achievable. COMPARISON: 10/01/2019 HISTORY: ORDERING SYSTEM PROVIDED HISTORY: altered mental status TECHNOLOGIST PROVIDED HISTORY: Reason for exam:->altered mental status Has a \"code stroke\" or \"stroke alert\" been called? ->No Decision Support Exception - unselect if not a suspected or confirmed emergency medical condition->Emergency Medical Condition (MA) Additional signs and symptoms: ams FINDINGS: BRAIN/VENTRICLES: There is no acute intracranial hemorrhage, mass effect or midline shift. No abnormal extra-axial fluid collection. The gray-white differentiation is maintained without evidence of an acute infarct. There is no evidence of hydrocephalus. ORBITS: The visualized portion of the orbits demonstrate no acute abnormality. SINUSES: The visualized paranasal sinuses and mastoid air cells demonstrate no acute abnormality. SOFT TISSUES/SKULL:  No acute abnormality of the visualized skull or soft tissues. No acute intracranial abnormality. CT CERVICAL SPINE WO CONTRAST    Result Date: 7/16/2021  EXAMINATION: CT OF THE CERVICAL SPINE WITHOUT CONTRAST 7/16/2021 8:31 pm TECHNIQUE: CT of the cervical spine was performed without the administration of intravenous contrast. Multiplanar reformatted images are provided for review. Dose modulation, iterative reconstruction, and/or weight based adjustment of the mA/kV was utilized to reduce the radiation dose to as low as reasonably achievable. COMPARISON: None. HISTORY: ORDERING SYSTEM PROVIDED HISTORY: fal, concern for traumatic injury TECHNOLOGIST PROVIDED HISTORY: Reason for exam:->fal, concern for traumatic injury Reason for Exam: fall Acuity: Acute Type of Exam: Initial Mechanism of Injury: fall Relevant Medical/Surgical History: fall FINDINGS: BONES/ALIGNMENT: There is no acute fracture or traumatic malalignment. DEGENERATIVE CHANGES: No significant degenerative changes. There is diffuse idiopathic skeletal hyperostosis. SOFT TISSUES: There is no prevertebral soft tissue swelling. No acute abnormality of the cervical spine. XR CHEST PORTABLE    Result Date: 7/16/2021  EXAMINATION: ONE XRAY VIEW OF THE CHEST 7/16/2021 2:10 pm COMPARISON: September 21, 2018 HISTORY: ORDERING SYSTEM PROVIDED HISTORY: altered mental status, concern for infection TECHNOLOGIST PROVIDED HISTORY: Reason for exam:->altered mental status, concern for infection FINDINGS: No evidence of pneumonia, edema or other acute pulmonary process. No evidence of acute process of the cardiac or mediastinal structures. No evidence of pneumothorax or pleural effusion. No evidence of acute cardiopulmonary disease. Physical Exam:  Vitals: BP (!) 111/53   Pulse 75   Temp 96.4 °F (35.8 °C)   Resp 16   Ht 5' 8\" (1.727 m)   Wt 162 lb 11.2 oz (73.8 kg)   SpO2 96%   BMI 24.74 kg/m²   24 hour intake/output:    Intake/Output Summary (Last 24 hours) at 7/19/2021 1310  Last data filed at 7/19/2021 0915  Gross per 24 hour   Intake 120 ml   Output --   Net 120 ml     Last 3 weights:   Wt Readings from Last 3 Encounters:   07/19/21 162 lb 11.2 oz (73.8 kg)   05/21/21 172 lb 6.4 oz (78.2 kg)   05/10/21 175 lb 6.4 oz (79.6 kg)       General appearance - oriented to person, place, and time and acyanotic, in no respiratory distress  HEENT: Normocephalic, Atraumatic, Conjuctiva pink, PERRL, Oral mucosa normal, Lips, teeth and gums normal, Trachea midline, Thyroid normal and No noted lymphadenopathy  Chest - clear to auscultation, no wheezes, rales or rhonchi, symmetric air entry  Cardiovascular - normal rate, regular rhythm, normal S1, S2, no murmurs, rubs, clicks or gallops  Abdomen - soft, nontender, nondistended, no masses or organomegaly   Neurological - Alert and oriented, Normal speech, No focal findings or movement disorder noted and Motor and sensory grossly normal bilaterally  Integumentary - Skin color, texture, turgor normal. No Rashes or disturbance (HCC) / 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[facility]  Continue on home medication regimen as ordered, psychiatry consult follow up with further recommendations detailed and appreciated.       Skin tear of left elbow without complication  Healing with no redness or infection.     Resolved Problems:    * No resolved hospital problems. *  Patient to be discharged to the [facility] whenever pre-CERT is approved by her insurance.     Electronically signed by Juan David Cross MD on 7/19/2021 at 1:10 PM    Rounding Hospitalist

## 2021-07-19 NOTE — DISCHARGE INSTR - COC
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    Other fatigue R53.83    Dementia with behavioral disturbance (HCC) F03.91    Other constipation K59.09    AMS (altered mental status) R41.82    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[facility] G20, F02.80    Skin tear of left elbow without complication F47.038L       Isolation/Infection:   Isolation            No Isolation          Patient Infection Status       None to display            Nurse Assessment:  Last Vital Signs: BP (!) 111/53   Pulse 75   Temp 96.4 °F (35.8 °C)   Resp 16   Ht 5' 8\" (1.727 m)   Wt 162 lb 11.2 oz (73.8 kg)   SpO2 96%   BMI 24.74 kg/m²     Last documented pain score (0-10 scale): Pain Level: 0  Last Weight:   Wt Readings from Last 1 Encounters:   07/19/21 162 lb 11.2 oz (73.8 kg)     Mental Status:  Disoriented, history of dementia    IV Access:  - None    Nursing Mobility/ADLs:  Walking   Assisted  Transfer  Assisted  Bathing  Assisted  Dressing  Assisted  Toileting  Assisted  Feeding  Assisted  Med Admin  Assisted  Med Delivery   whole    Wound Care Documentation and Therapy:        Elimination:  Continence:   · Bowel: Yes  · Bladder: Yes  Urinary Catheter: None   Colostomy/Ileostomy/Ileal Conduit: No       Date of Last BM: 7-19-21    Intake/Output Summary (Last 24 hours) at 7/19/2021 1104  Last data filed at 7/19/2021 0915  Gross per 24 hour   Intake 240 ml   Output --   Net 240 ml     I/O last 3 completed shifts: In: 120 [P.O.:120]  Out: -     Safety Concerns:     S[employer] [place], History of Falls (last 30 days) and At Risk for Falls    Impairments/Disabilities:      None    Nutrition Therapy:  Current Nutrition Therapy:   - Oral Diet:  General    Routes of Feeding: Oral  Liquids:  Thin Liquids  Daily Fluid Restriction: no  Last Modified Barium Swallow with Video (Video Swallowing Test): not done    Treatments at the Time of Hospital Discharge:   Respiratory Treatments: NA    Oxygen Therapy:  is not on home oxygen therapy. Ventilator:    - No ventilator support    Rehab Therapies: Physical Therapy and Occupational Therapy  Weight Bearing Status/Restrictions: No weight bearing restirctions  Other Medical Equipment (for information only, NOT a DME order):  walker  Other Treatments: ***    Patient's personal belongings (please select all that are sent with patient):  Glasses, Dentures upper and lower    RN SIGNATURE:  Electronically signed by Raheel Knott RN on 7/20/21 at 6:01 PM EDT    CASE MANAGEMENT/SOCIAL WORK SECTION    Inpatient Status Date: 07/16/2021     Readmission Risk Assessment Score:  Readmission Risk              Risk of Unplanned Readmission:  0           Discharging to Facility/ Agency   · Name: [address] [place] [address]   · Address: [address] [place] [address],[address] [place] [address]  · Phone: [phone]  · Fax: [phone]    Dialysis Facility (if applicable)   · Name:  · Address:  · Dialysis Schedule:  · Phone:  · Fax:    / signature: Electronically signed by An Donald RN on 7/19/21 at 11:06 AM EDT    PHYSICIAN SECTION    Prognosis: Good    Condition at Discharge: Stable    Rehab Potential (if transferring to Rehab): Good    Recommended Labs or Other Treatments After Discharge: weekly CMP to monitor potassium, creatinine and bicarb levels; Evaluate twice a week for fecal disimpaction    Physician Certification: I certify the above information and transfer of Gavino Bynum  is necessary for the continuing treatment of the diagnosis listed and that she requires [facility] for less 30 days.      Update Admission H&P: No change in H&P    PHYSICIAN SIGNATURE:  Electronically signed by Cisco Alexis DO on 7/20/21 at 2:21 PM EDT

## 2021-07-19 NOTE — PLAN OF CARE
Problem: Falls - Risk of:  Goal: Will remain free from falls  Description: Will remain free from falls  Outcome: Ongoing  Goal: Absence of physical injury  Description: Absence of physical injury  Outcome: Ongoing     Problem: Skin Integrity:  Goal: Will show no infection signs and symptoms  Description: Will show no infection signs and symptoms  Outcome: Ongoing  Goal: Absence of new skin breakdown  Description: Absence of new skin breakdown  Outcome: Ongoing     Problem: Discharge Planning:  Goal: Discharged to appropriate level of care  Description: Discharged to appropriate level of care  Outcome: Ongoing     Problem: Health Behavior:  Goal: Identification of resources available to assist in meeting health care needs will improve  Description: Identification of resources available to assist in meeting health care needs will improve  Outcome: Ongoing     Problem: Physical Regulation:  Goal: Complications related to the disease process, condition or treatment will be avoided or minimized  Description: Complications related to the disease process, condition or treatment will be avoided or minimized  Outcome: Ongoing  Goal: Compliance with treatment plan for underlying cause of condition will improve  Description: Compliance with treatment plan for underlying cause of condition will improve  Outcome: Ongoing     Problem: Self-Care:  Goal: Ability to participate in self-care as condition permits will improve  Description: Ability to participate in self-care as condition permits will improve  Outcome: Ongoing     Problem: Pain:  Goal: Pain level will decrease  Description: Pain level will decrease  Outcome: Ongoing  Goal: Control of acute pain  Description: Control of acute pain  Outcome: Ongoing  Goal: Control of chronic pain  Description: Control of chronic pain  Outcome: Ongoing

## 2021-07-20 VITALS
BODY MASS INDEX: 24.66 KG/M2 | HEART RATE: 91 BPM | RESPIRATION RATE: 16 BRPM | DIASTOLIC BLOOD PRESSURE: 58 MMHG | SYSTOLIC BLOOD PRESSURE: 117 MMHG | HEIGHT: 68 IN | WEIGHT: 162.7 LBS | OXYGEN SATURATION: 97 % | TEMPERATURE: 98.2 F

## 2021-07-20 LAB
ALBUMIN SERPL-MCNC: 3.2 GM/DL (ref 3.4–5)
ANION GAP SERPL CALCULATED.3IONS-SCNC: 4 MMOL/L (ref 4–16)
BUN BLDV-MCNC: 27 MG/DL (ref 6–23)
CALCIUM SERPL-MCNC: 8.7 MG/DL (ref 8.3–10.6)
CHLORIDE BLD-SCNC: 104 MMOL/L (ref 99–110)
CO2: 29 MMOL/L (ref 21–32)
CREAT SERPL-MCNC: 1.5 MG/DL (ref 0.6–1.1)
GFR AFRICAN AMERICAN: 40 ML/MIN/1.73M2
GFR NON-AFRICAN AMERICAN: 33 ML/MIN/1.73M2
GLUCOSE BLD-MCNC: 89 MG/DL (ref 70–99)
MAGNESIUM: 1.9 MG/DL (ref 1.8–2.4)
PHOSPHORUS: 3.2 MG/DL (ref 2.5–4.9)
POTASSIUM SERPL-SCNC: 4.2 MMOL/L (ref 3.5–5.1)
SODIUM BLD-SCNC: 137 MMOL/L (ref 135–145)

## 2021-07-20 PROCEDURE — 80069 RENAL FUNCTION PANEL: CPT

## 2021-07-20 PROCEDURE — 97530 THERAPEUTIC ACTIVITIES: CPT

## 2021-07-20 PROCEDURE — G0378 HOSPITAL OBSERVATION PER HR: HCPCS

## 2021-07-20 PROCEDURE — 6370000000 HC RX 637 (ALT 250 FOR IP): Performed by: NURSE PRACTITIONER

## 2021-07-20 PROCEDURE — 36415 COLL VENOUS BLD VENIPUNCTURE: CPT

## 2021-07-20 PROCEDURE — 2580000003 HC RX 258: Performed by: INTERNAL MEDICINE

## 2021-07-20 PROCEDURE — 6370000000 HC RX 637 (ALT 250 FOR IP): Performed by: INTERNAL MEDICINE

## 2021-07-20 PROCEDURE — 97535 SELF CARE MNGMENT TRAINING: CPT

## 2021-07-20 PROCEDURE — 83735 ASSAY OF MAGNESIUM: CPT

## 2021-07-20 RX ORDER — ONDANSETRON 4 MG/1
4 TABLET, ORALLY DISINTEGRATING ORAL EVERY 8 HOURS PRN
Qty: 10 TABLET | Refills: 0 | Status: SHIPPED | OUTPATIENT
Start: 2021-07-20 | End: 2021-08-03 | Stop reason: ALTCHOICE

## 2021-07-20 RX ORDER — LACTULOSE 10 G/15ML
20 SOLUTION ORAL 2 TIMES DAILY
Qty: 1800 ML | Refills: 0 | Status: SHIPPED | OUTPATIENT
Start: 2021-07-20 | End: 2021-08-03

## 2021-07-20 RX ORDER — QUETIAPINE FUMARATE 25 MG/1
12.5 TABLET, FILM COATED ORAL DAILY
Qty: 60 TABLET | Refills: 3 | Status: SHIPPED | OUTPATIENT
Start: 2021-07-21 | End: 2021-08-03

## 2021-07-20 RX ORDER — SENNA AND DOCUSATE SODIUM 50; 8.6 MG/1; MG/1
2 TABLET, FILM COATED ORAL 2 TIMES DAILY
Qty: 120 TABLET | Refills: 0 | Status: SHIPPED | OUTPATIENT
Start: 2021-07-20 | End: 2021-08-03

## 2021-07-20 RX ORDER — METOPROLOL TARTRATE 5 MG/5ML
INJECTION INTRAVENOUS
Status: DISCONTINUED
Start: 2021-07-20 | End: 2021-07-20 | Stop reason: HOSPADM

## 2021-07-20 RX ORDER — ASPIRIN 81 MG/1
81 TABLET, CHEWABLE ORAL DAILY
Qty: 30 TABLET | Refills: 3 | Status: SHIPPED | OUTPATIENT
Start: 2021-07-21

## 2021-07-20 RX ORDER — QUETIAPINE FUMARATE 25 MG/1
75 TABLET, FILM COATED ORAL NIGHTLY
Qty: 60 TABLET | Refills: 3 | Status: SHIPPED | OUTPATIENT
Start: 2021-07-20 | End: 2021-08-03

## 2021-07-20 RX ORDER — POLYETHYLENE GLYCOL 3350 17 G/17G
17 POWDER, FOR SOLUTION ORAL 2 TIMES DAILY
Qty: 527 G | Refills: 1 | Status: SHIPPED | OUTPATIENT
Start: 2021-07-20 | End: 2021-08-19

## 2021-07-20 RX ORDER — SODIUM BICARBONATE 650 MG/1
650 TABLET ORAL 2 TIMES DAILY
Qty: 60 TABLET | Refills: 0 | Status: SHIPPED | OUTPATIENT
Start: 2021-07-20 | End: 2021-08-03 | Stop reason: ALTCHOICE

## 2021-07-20 RX ORDER — RIVASTIGMINE 4.6 MG/24H
1 PATCH, EXTENDED RELEASE TRANSDERMAL DAILY
Qty: 30 PATCH | Refills: 3 | Status: SHIPPED | OUTPATIENT
Start: 2021-07-21 | End: 2021-10-18 | Stop reason: ALTCHOICE

## 2021-07-20 RX ORDER — HYDRALAZINE HYDROCHLORIDE 25 MG/1
25 TABLET, FILM COATED ORAL EVERY 8 HOURS SCHEDULED
Qty: 90 TABLET | Refills: 3 | Status: SHIPPED | OUTPATIENT
Start: 2021-07-20 | End: 2021-08-03 | Stop reason: ALTCHOICE

## 2021-07-20 RX ADMIN — CARBIDOPA AND LEVODOPA 1 TABLET: 25; 100 TABLET ORAL at 14:54

## 2021-07-20 RX ADMIN — POLYETHYLENE GLYCOL 3350 17 G: 17 POWDER, FOR SOLUTION ORAL at 09:19

## 2021-07-20 RX ADMIN — QUETIAPINE FUMARATE 12.5 MG: 25 TABLET ORAL at 09:18

## 2021-07-20 RX ADMIN — HYDRALAZINE HYDROCHLORIDE 25 MG: 25 TABLET, FILM COATED ORAL at 14:54

## 2021-07-20 RX ADMIN — ASPIRIN 81 MG CHEWABLE TABLET 81 MG: 81 TABLET CHEWABLE at 09:18

## 2021-07-20 RX ADMIN — SODIUM CHLORIDE, PRESERVATIVE FREE 10 ML: 5 INJECTION INTRAVENOUS at 09:19

## 2021-07-20 RX ADMIN — AMLODIPINE BESYLATE 10 MG: 10 TABLET ORAL at 09:18

## 2021-07-20 RX ADMIN — CARBIDOPA AND LEVODOPA 1 TABLET: 25; 100 TABLET ORAL at 09:18

## 2021-07-20 RX ADMIN — DOCUSATE SODIUM 50 MG AND SENNOSIDES 8.6 MG 2 TABLET: 8.6; 5 TABLET, FILM COATED ORAL at 09:23

## 2021-07-20 RX ADMIN — CLOTRIMAZOLE AND BETAMETHASONE DIPROPIONATE: 10; .5 CREAM TOPICAL at 09:29

## 2021-07-20 RX ADMIN — SODIUM BICARBONATE TAB 650 MG 650 MG: 650 TAB at 09:18

## 2021-07-20 RX ADMIN — LACTULOSE 20 G: 10 SOLUTION ORAL at 09:19

## 2021-07-20 RX ADMIN — HYDRALAZINE HYDROCHLORIDE 25 MG: 25 TABLET, FILM COATED ORAL at 06:23

## 2021-07-20 NOTE — PLAN OF CARE
Problem: Falls - Risk of:  Goal: Will remain free from falls  Description: Will remain free from falls  7/20/2021 0019 by Stevenson Rene RN  Outcome: Ongoing  7/19/2021 1204 by Vitor Freire  Outcome: Ongoing  Goal: Absence of physical injury  Description: Absence of physical injury  7/20/2021 0019 by Stevenson Rene RN  Outcome: Ongoing  7/19/2021 1204 by Vitor Freire  Outcome: Ongoing     Problem: Skin Integrity:  Goal: Will show no infection signs and symptoms  Description: Will show no infection signs and symptoms  7/20/2021 0019 by Stevenson Rene RN  Outcome: Ongoing  7/19/2021 1204 by Vitor Freire  Outcome: Ongoing  Goal: Absence of new skin breakdown  Description: Absence of new skin breakdown  7/20/2021 0019 by Stevenson Rene RN  Outcome: Ongoing  7/19/2021 1204 by Vitor Freire  Outcome: Ongoing     Problem: Discharge Planning:  Goal: Discharged to appropriate level of care  Description: Discharged to appropriate level of care  7/20/2021 0019 by Stevenson Rene RN  Outcome: Ongoing  7/19/2021 1204 by Vitor Freire  Outcome: Ongoing     Problem: Health Behavior:  Goal: Identification of resources available to assist in meeting health care needs will improve  Description: Identification of resources available to assist in meeting health care needs will improve  7/20/2021 0019 by Stevenson Rene RN  Outcome: Ongoing  7/19/2021 1204 by Vitor Freire  Outcome: Ongoing     Problem: Physical Regulation:  Goal: Complications related to the disease process, condition or treatment will be avoided or minimized  Description: Complications related to the disease process, condition or treatment will be avoided or minimized  7/20/2021 0019 by Stevenson Rene RN  Outcome: Ongoing  7/19/2021 1204 by Vitor Freire  Outcome: Ongoing  Goal: Compliance with treatment plan for underlying cause of condition will improve  Description: Compliance with treatment plan for underlying cause of condition will improve  7/20/2021 0019 by Hue Tijerina RN  Outcome: Ongoing  7/19/2021 1204 by Abhijit Woodall  Outcome: Ongoing     Problem: Self-Care:  Goal: Ability to participate in self-care as condition permits will improve  Description: Ability to participate in self-care as condition permits will improve  7/20/2021 0019 by Hue Tijerina RN  Outcome: Ongoing  7/19/2021 1204 by Abhijit Woodall  Outcome: Ongoing     Problem: Pain:  Description: Pain management should include both nonpharmacologic and pharmacologic interventions.   Goal: Pain level will decrease  Description: Pain level will decrease  7/20/2021 0019 by Hue Tijerina RN  Outcome: Ongoing  7/19/2021 1204 by Abhijit Woodall  Outcome: Ongoing  Goal: Control of acute pain  Description: Control of acute pain  7/20/2021 0019 by Hue Tijerina RN  Outcome: Ongoing  7/19/2021 1204 by Abhijit Woodall  Outcome: Ongoing  Goal: Control of chronic pain  Description: Control of chronic pain  7/20/2021 0019 by Hue Tijerina RN  Outcome: Ongoing  7/19/2021 1204 by Abhijit Woodall  Outcome: Ongoing

## 2021-07-20 NOTE — PLAN OF CARE
Problem: Falls - Risk of:  Goal: Will remain free from falls  Description: Will remain free from falls  7/20/2021 0933 by Giana Dumont RN  Outcome: Ongoing  7/20/2021 0019 by Jerzy Quevedo RN  Outcome: Ongoing  Goal: Absence of physical injury  Description: Absence of physical injury  7/20/2021 0933 by Giana Dumont RN  Outcome: Ongoing  7/20/2021 0019 by Jerzy Quevedo RN  Outcome: Ongoing     Problem: Skin Integrity:  Goal: Will show no infection signs and symptoms  Description: Will show no infection signs and symptoms  7/20/2021 0933 by Giana Dumont RN  Outcome: Ongoing  7/20/2021 0019 by Jerzy Quevedo RN  Outcome: Ongoing  Goal: Absence of new skin breakdown  Description: Absence of new skin breakdown  7/20/2021 0933 by Giana Dumont RN  Outcome: Ongoing  7/20/2021 0019 by Jerzy Quevedo RN  Outcome: Ongoing     Problem: Discharge Planning:  Goal: Discharged to appropriate level of care  Description: Discharged to appropriate level of care  7/20/2021 0933 by Giana Dumont RN  Outcome: Ongoing  7/20/2021 0019 by Jerzy Quevedo RN  Outcome: Ongoing     Problem: Health Behavior:  Goal: Identification of resources available to assist in meeting health care needs will improve  Description: Identification of resources available to assist in meeting health care needs will improve  7/20/2021 0933 by Giana Dumont RN  Outcome: Ongoing  7/20/2021 0019 by Jerzy Quevedo RN  Outcome: Ongoing     Problem: Physical Regulation:  Goal: Complications related to the disease process, condition or treatment will be avoided or minimized  Description: Complications related to the disease process, condition or treatment will be avoided or minimized  7/20/2021 0933 by Giana Dumont RN  Outcome: Ongoing  7/20/2021 0019 by Jerzy Quevedo RN  Outcome: Ongoing  Goal: Compliance with treatment plan for underlying cause of condition will improve  Description: Compliance with treatment plan for underlying cause of condition will improve  7/20/2021 0933 by Raheel Knott RN  Outcome: Ongoing  7/20/2021 0019 by Mahnaz Saleh RN  Outcome: Ongoing     Problem: Self-Care:  Goal: Ability to participate in self-care as condition permits will improve  Description: Ability to participate in self-care as condition permits will improve  7/20/2021 0933 by Raheel Knott RN  Outcome: Ongoing  7/20/2021 0019 by Mahnaz Saleh RN  Outcome: Ongoing     Problem: Pain:  Goal: Pain level will decrease  Description: Pain level will decrease  7/20/2021 0933 by Raheel Knott RN  Outcome: Ongoing  7/20/2021 0019 by Mahnaz Saleh RN  Outcome: Ongoing  Goal: Control of acute pain  Description: Control of acute pain  7/20/2021 0933 by Raheel Knott RN  Outcome: Ongoing  7/20/2021 0019 by Mahnaz Saleh RN  Outcome: Ongoing  Goal: Control of chronic pain  Description: Control of chronic pain  7/20/2021 0933 by Raheel Knott RN  Outcome: Ongoing  7/20/2021 0019 by Mahnaz Saleh RN  Outcome: Ongoing

## 2021-07-20 NOTE — PROGRESS NOTES
Occupational Therapy    Occupational Therapy Treatment Note  Name: Larisa Gonzalez MRN: [id number] :   1935   Date:  2021   Admission Date: 2021 Room:  [address] [place] [address]   Restrictions/Precautions:  Restrictions/Precautions  Restrictions/Precautions: Fall Risk     Communication with other providers:   Cleared for treatment by RN. Subjective:  Patient states:  \"I can get up and try to use the bathroom\"  Pain:   Location, Type, Intensity (0/10 to 10/10): No pain    Objective:    Observation:  Pt alert and oriented. Treatment, including education:  Transfers  Supine to sit :Min A  Sit to supine : Mod A  Scooting :SBA  Sit to stand :Min A and slight unsteadiness once standing  Stand to sit :CGA  SPT:CGA  Toilet:Min A pt fearful of sitting down and mod verbal/tactile cues to sit down due to lower surface. Self Care Training:   Activities performed today included the following: Toileting  Max A for toilet hygiene and clothing management      Therapeutic Activity Training:  Pt completed functional mobility in room with RW with CGA and mod cues for directions to navigate in room. Safety Measures: Gait belt used, Left in bed, Pull/Bed Alarm activated and call light left in reach        Assessment / Impression:        Patient's tolerance of treatment: Good   Adverse Reaction: None  Significant change in status and impact:  None  Barriers to improvement: Dec strength and endurance. Plan for Next Session:    Continue with POC.     Time in:  1410  Time out:  1424  Total treatment time:  14  Billed Units: 1 ADL  Electronically signed by:    Charly Curran, [address]    2021, 2:49 PM    Previously filed values:  Patient Goals   Patient goals : She wants to be with her   Short term goals  Time Frame for Short term goals: until discharge  Short term goal 1: Pt will be CGA/SBAfor functional adl transfers to the chair, toilet or bed w/o LOB or falls with min vc  Short term goal 2: Pt will be min A+ min vc for ub bathing and dressing/Mod A for LB adls using necessary a.e  Short term goal 3: Pt will be min vc for UB ROM and strengthening to assist with transfers and adls;  Core strengthening for balance and transfers

## 2021-07-20 NOTE — DISCHARGE SUMMARY
Discharge Summary      Patient ID: Viktor Colmenares      Patient's PCP: Cuong Drake MD    Admit Date: 7/16/2021     Discharge Date:  7/20/21    Admitting Provider: Jada Andrews MD    Discharging Provider: Lu Herrera DO     Reason for this admission: 82 y.o. female with significant past medical history of essential hypertension, Parkinson's disease with associated dementia, CKD stage III, glaucoma, myelodysplastic syndrome; who presents with change in mental status       Discharge Diagnoses: Active Hospital Problems    Diagnosis Date Noted    AMS (altered mental status) [R41.82] 07/16/2021    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[address] [G20, F02.80] 07/16/2021    Skin tear of left elbow without complication [V01.533C] 46/91/6891    Dementia with behavioral disturbance ([address] [F03.91] 01/05/2021    Myelodysplastic syndrome ([address] [D46.9] 10/22/2018    Thrombocytopenia ([address] [D69.6] 09/23/2018    Major depressive disorder with single episode [F32.9] 09/13/2018    Glaucoma of both eyes [H40.9] 07/12/2016    Essential hypertension [I10]     Mixed hyperlipidemia [E78.2]        Procedures:  none    Consults:   IP CONSULT TO CASE MANAGEMENT  IP CONSULT TO SOCIAL WORK  IP CONSULT TO PSYCHIATRY      Briefly:   82 y.o. female with significant past medical history of essential hypertension, Parkinson's disease with associated dementia, CKD stage III, glaucoma, myelodysplastic syndrome; who presents with change in mental status . patient could not recognize the  and therefore he had activated the squad and patient was brought to the ED. Patient could not give any meaningful history although she was able to tell me she fell with injury to the left elbow. Extensive work-up including CT brain and chest x-ray had been unremarkable. UA was also negative and there was no focus of infection.      Hospital Course:   Patient admitted with AMS (altered mental status) with auditory and visual hallucinations from worsening parkinson demential. ; Work-up so far unremarkable. Patient seen by psychiatry consult and seroquel dose adjusted, exelon patch added, decreased sinemet. Patient follows up with a neurologist Dr. Marlin Penaloza in [facility] and will be following up with her upon discharge to home.       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 Continue with p.o. bicarb to control acidosis.  Avoid all nephrotoxins and renally dose all medications and maintain patient on renal diet.     As for the  Essential hypertension - stable during hospitalzaiton with norvasc 10mg daily     As for  Mixed hyperlipidemia - resume statin       She was continued on eye drops for Glaucoma of both eyes     As for the  Major depressive disorder with single episode  Home medication regimen resumed, will involve psychiatrist to assess her for further changes to her medication regimen      As for the  Myelodysplastic syndrome (HCC) with thrombocytopenia ([address]  Maintained on SCDs for DVT prophylaxis, no heparin or Lovenox indicated to prevent bleeding due to thrombocytopenia.       Healing with no redness or infection.   Active Hospital Problems    Diagnosis Date Noted    AMS (altered mental status) [R41.82] 07/16/2021    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[address] [G20, F02.80] 07/16/2021    Skin tear of left elbow without complication [X55.174D] 51/50/7262    Dementia with behavioral disturbance ([address] [F03.91] 01/05/2021    Myelodysplastic syndrome ([address] [D46.9] 10/22/2018    Thrombocytopenia ([address] [D69.6] 09/23/2018    Major depressive disorder with single episode [F32.9] 09/13/2018    Glaucoma of both eyes [H40.9] 07/12/2016    Essential hypertension [I10]     Mixed hyperlipidemia [E78.2]        Disposition: SNF    Discharged Condition: Stable    Vital Signs  Temp: 96.2 °F (35.7 °C)  Pulse: 71  Resp: 20  BP: (!) 122/58  SpO2: 97 %  O2 Device: None (Room air) Vital signs reviewed in electronic chart. Physical exam  Constitutional:  Well developed, well nourished, no acute distress. Respiratory:  No respiratory distress, no wheezing, rales or rhonchi detected. Cardiovascular:  Normal rate, normal rhythm, no murmurs, no gallops, no rubs. Musculoskeletal:  1+ edema, cyanosis or obvious acute deformity Moving all extremities. But required 2 person to get from chair back into bed. Integument:  Warm and dry. No rash. Neurologic:  Alert & oriented x 3    Activity: activity as tolerated  Diet: renal diet  Follow Up: Primary Care Physician in 1 week after discharge from SNF  Patient to proceed with the following lab 1  week ): BMP     Labs:  For convenience and continuity at follow-up the following most recent labs are provided:  Recent Results (from the past 24 hour(s))   Magnesium    Collection Time: 07/20/21  6:00 AM   Result Value Ref Range    Magnesium 1.9 1.8 - 2.4 mg/dl   Renal Function Panel    Collection Time: 07/20/21  6:00 AM   Result Value Ref Range    Sodium 137 135 - 145 MMOL/L    Potassium 4.2 3.5 - 5.1 MMOL/L    Chloride 104 99 - 110 mMol/L    CO2 29 21 - 32 MMOL/L    Anion Gap 4 4 - 16    BUN 27 (H) 6 - 23 MG/DL    CREATININE 1.5 (H) 0.6 - 1.1 MG/DL    Glucose 89 70 - 99 MG/DL    Calcium 8.7 8.3 - 10.6 MG/DL    GFR Non- 33 (L) >60 mL/min/1.73m2    GFR  40 (L) >60 mL/min/1.73m2    Albumin 3.2 (L) 3.4 - 5.0 GM/DL    Phosphorus 3.2 2.5 - 4.9 MG/DL        CBC:   Lab Results   Component Value Date    WBC 7.3 07/19/2021    HGB 9.9 07/19/2021    HCT 32.0 07/19/2021    PLT 43 07/19/2021       RENAL:   Lab Results   Component Value Date     07/20/2021    K 4.2 07/20/2021     07/20/2021    CO2 29 07/20/2021    BUN 27 07/20/2021    CREATININE 1.5 07/20/2021         Discharge Medications:     Current Discharge Medication List           Details   aspirin 81 MG chewable tablet Take 1 tablet by mouth daily  Qty: 30 tablet, Refills: 3      sodium bicarbonate 650 MG tablet Take 1 tablet by mouth 2 times daily  Qty: 60 tablet, Refills: 0      ondansetron (ZOFRAN-ODT) 4 MG disintegrating tablet Take 1 tablet by mouth every 8 hours as needed for Nausea or Vomiting  Qty: 10 tablet, Refills: 0      hydrALAZINE (APRESOLINE) 25 MG tablet Take 1 tablet by mouth every 8 hours  Qty: 90 tablet, Refills: 3      polyethylene glycol (GLYCOLAX) 17 g packet Take 17 g by mouth 2 times daily  Qty: 527 g, Refills: 1      rivastigmine (EXELON) 4.6 MG/24HR Place 1 patch onto the skin daily  Qty: 30 patch, Refills: 3              Details   !! QUEtiapine (SEROQUEL) 25 MG tablet Take 0.5 tablets by mouth daily  Qty: 60 tablet, Refills: 3      !! QUEtiapine (SEROQUEL) 25 MG tablet Take 3 tablets by mouth nightly  Qty: 60 tablet, Refills: 3      sennosides-docusate sodium (SENOKOT-S) 8.6-50 MG tablet Take 2 tablets by mouth 2 times daily  Qty: 120 tablet, Refills: 0      lactulose (CHRONULAC) 10 GM/15ML solution Take 30 mLs by mouth 2 times daily  Qty: 1800 mL, Refills: 0       !! - Potential duplicate medications found. Please discuss with provider. Details   LINZESS 290 MCG CAPS capsule TAKE 1 CAPSULE BY MOUTH EVERY DAY      amLODIPine (NORVASC) 10 MG tablet TAKE 1 TABLET DAILY  Qty: 90 tablet, Refills: 1      atorvastatin (LIPITOR) 10 MG tablet TAKE 1 TABLET DAILY  Qty: 90 tablet, Refills: 1      carbidopa-levodopa (SINEMET)  MG per tablet TAKE 1 (ONE) TABLET BY MOUTH 3 (THREE) TIMES A DAY . tolterodine (DETROL LA) 4 MG extended release capsule Take 4 mg by mouth daily Dr. Kayce Rizvi      latanoprost (XALATAN) 0.005 % ophthalmic solution 1 drop nightly.       pramoxine-zinc oxide in mineral oil (TUCKS HEMORRHOIDAL) 1-12.5 % OINT ointment Place rectally 3 times daily as needed (pain relief)  Qty: 1 Tube, Refills: 5    Associated Diagnoses: Chronic constipation; Grade I hemorrhoids      clotrimazole-betamethasone (LOTRISONE) 1-0.05 % cream Apply topically 2 times daily. Qty: 45 g, Refills: 1                Total discharge time 45 minutes    Signed:  Electronically signed by Gagandeep Coates DO on 7/20/2021 at 2:22 PM       Thank you Biju Tristan MD for the opportunity to be involved in this patient's care.

## 2021-07-20 NOTE — CARE COORDINATION
NURSING: The patient's pre-cert has been approved and [facility] can accept the patient today. Please complete the [address] [place], [place] [address]s [place] form and call report to [phone]. Transportation has been arranged at 7 PM through [facility].  has already notified the patient's spouse Mega Whitley.

## 2021-07-20 NOTE — PLAN OF CARE
Problem: Falls - Risk of:  Goal: Will remain free from falls  Description: Will remain free from falls  7/20/2021 1828 by Jose Pérez RN  Outcome: Completed  7/20/2021 0933 by Jose Pérez RN  Outcome: Ongoing  Goal: Absence of physical injury  Description: Absence of physical injury  7/20/2021 1828 by Jose Pérez RN  Outcome: Completed  7/20/2021 0933 by Jose Pérez RN  Outcome: Ongoing     Problem: Skin Integrity:  Goal: Will show no infection signs and symptoms  Description: Will show no infection signs and symptoms  7/20/2021 1828 by Jose Pérez RN  Outcome: Completed  7/20/2021 0933 by Jose Pérez RN  Outcome: Ongoing  Goal: Absence of new skin breakdown  Description: Absence of new skin breakdown  7/20/2021 1828 by Jose Pérez RN  Outcome: Completed  7/20/2021 0933 by Jose Pérez RN  Outcome: Ongoing     Problem: Discharge Planning:  Goal: Discharged to appropriate level of care  Description: Discharged to appropriate level of care  7/20/2021 1828 by Jose Pérez RN  Outcome: Completed  7/20/2021 0933 by Jose Pérez RN  Outcome: Ongoing     Problem: Health Behavior:  Goal: Identification of resources available to assist in meeting health care needs will improve  Description: Identification of resources available to assist in meeting health care needs will improve  7/20/2021 1828 by Jose Pérez RN  Outcome: Completed  7/20/2021 0933 by Jose Pérez RN  Outcome: Ongoing     Problem: Physical Regulation:  Goal: Complications related to the disease process, condition or treatment will be avoided or minimized  Description: Complications related to the disease process, condition or treatment will be avoided or minimized  7/20/2021 1828 by Jose Pérez RN  Outcome: Completed  7/20/2021 0933 by Jose Pérez RN  Outcome: Ongoing  Goal: Compliance with treatment plan for underlying cause of condition will improve  Description: Compliance with treatment plan for underlying cause of condition will improve  7/20/2021 1828 by Cyntha Kehr, RN  Outcome: Completed  7/20/2021 0933 by Cyntha Kehr, RN  Outcome: Ongoing     Problem: Self-Care:  Goal: Ability to participate in self-care as condition permits will improve  Description: Ability to participate in self-care as condition permits will improve  7/20/2021 1828 by Cyntha Kehr, RN  Outcome: Completed  7/20/2021 0933 by Cyntha Kehr, RN  Outcome: Ongoing     Problem: Pain:  Goal: Pain level will decrease  Description: Pain level will decrease  7/20/2021 1828 by Cyntha Kehr, RN  Outcome: Completed  7/20/2021 0933 by Cyntha Kehr, RN  Outcome: Ongoing  Goal: Control of acute pain  Description: Control of acute pain  7/20/2021 1828 by Cyntha Kehr, RN  Outcome: Completed  7/20/2021 0933 by Cyntha Kehr, RN  Outcome: Ongoing  Goal: Control of chronic pain  Description: Control of chronic pain  7/20/2021 1828 by Cyntha Kehr, RN  Outcome: Completed  7/20/2021 0933 by Cyntha Kehr, RN  Outcome: Ongoing

## 2021-07-20 NOTE — CARE COORDINATION
CM received a voicemail from [place] [facility] stating that the patient's pre-cert has been approved and they can accept the patient today. CM notified Dr. Rosa Roman via [employer]. 1:45 PM  CM spoke with the patient's  Dena Small to notify of the pre-cert approval.  Dena Small feels that private transportation to the SNF will be best.  CM will follow. 1:53 PM  CM arranged transportation at 7 PM via [employer].       1:56 PM   CM notified [place] of transportation time. [place] confirmed that a rapid COVID test is not required.

## 2021-07-20 NOTE — PROGRESS NOTES
Physical Therapy Treatment Note  Name: Howard Holley MRN: [id number] :   1935   Date:  2021   Admission Date: 2021 Room:  [address] [place] [address]     Restrictions/Precautions:  Restrictions/Precautions  Restrictions/Precautions: Fall Risk         Communication with other providers:      Subjective:  Patient states: \"I guess I should try and go to the bathroom\"  Pain:   Location, Type, Intensity (0/10 to 10/10): unable to rate     Objective:    Observation: Pt awake in bed, HOB elevated     Treatment, including education/measures:    Bed Mobility:           []   Pt received out of bed   Rolling R/L:    Scooting: CGA at EOB  Lying --> Sit:  Mod a at trunk   Sit --> lying: Mod-max a for LE's into bed     Transfers:    Sit--> Stand: min assist for steadying and lift   Stand --> Sit: CGA to EOB  Chair-->Bed/Bed --> Chair: n/a  Car Transfers: n/a   Toilet Transfer (if applicable): mod assist for stand to sit   Other:    Assistive device required for transfer:  FWW    Gait:    Distance: 10 ft x 2 trials  Assistance: min assist for balance/walker management   Device:  FWW  Gait Quality:  Decreased step length bilaterally, decreased balance/postural control, poor walker management, shuffling gait      Stairs   # Completed:  Not attempted  Assistance:    Supportive Device:      Therapeutic Activity Training: Commode transfer requiring assist for controlled eccentric lowering, tactile cues for anterior trunk lean, and max verbal cues for technique.      Therapeutic Exercise: none    Safety Measures: Gait belt used, Left in bed, Bed Alarm activated and call light left in reach    Assessment / Impression:       Patient's tolerance of treatment: Poor/fair   Adverse Reaction: none  Significant change in status and impact:  none  Barriers to improvement:  Weakness, fatigue    Plan for Next Session:  Transfers, strengthening    Time in: 1410  Time out: 1423  Timed treatment minutes: 13  Billed Units:  1 TA    Previously filed items:  Social/Functional History  Lives With: Spouse  Type of Home: House  Home Layout: Two level, Able to Live on Main level with bedroom/bathroom  Home Access:  (Pt was unable to answer)  Bathroom Shower/Tub: Walk-in shower  Bathroom Toilet: Handicap height  Bathroom Equipment: Grab bars in shower, Shower chair, Grab bars around toilet  Home Equipment: 4 wheeled walker  Receives Help From: Family  ADL Assistance: Needs assistance  Homemaking Responsibilities: No  Ambulation Assistance: Needs assistance (RW)  Transfer Assistance: Needs assistance  Active : No  IADL Comments: Pt states her  takes care of her  Additional Comments: pt is a questionable historian and no family present during the eval to A  Short term goals  Time Frame for Short term goals: 1 week or until discharge:  Short term goal 1: Pt will demonstrate the ability to safely perform bed mobility with supervision. Short term goal 2: Pt will demonstrate the ability to safely transfer sit to stand and stand to sit from 3 different height surfaces with proper hand placement with 1 cue per transfer. Short term goal 3: Pt will demonstrate the ability to safely ambulate 50 feet with no rest break and RW and CGA.        Electronically signed by:    Karen Nevarez PT   07/20/21 2:53 PM

## 2021-08-03 ENCOUNTER — OFFICE VISIT (OUTPATIENT)
Dept: INTERNAL MEDICINE CLINIC | Age: 86
End: 2021-08-03
Payer: MEDICARE

## 2021-08-03 ENCOUNTER — TELEPHONE (OUTPATIENT)
Dept: INTERNAL MEDICINE CLINIC | Age: 86
End: 2021-08-03

## 2021-08-03 VITALS
BODY MASS INDEX: 25.94 KG/M2 | OXYGEN SATURATION: 93 % | DIASTOLIC BLOOD PRESSURE: 64 MMHG | TEMPERATURE: 97.6 F | WEIGHT: 170.6 LBS | RESPIRATION RATE: 20 BRPM | HEART RATE: 82 BPM | SYSTOLIC BLOOD PRESSURE: 102 MMHG

## 2021-08-03 DIAGNOSIS — H40.9 GLAUCOMA OF BOTH EYES, UNSPECIFIED GLAUCOMA TYPE: ICD-10-CM

## 2021-08-03 DIAGNOSIS — F03.91 DEMENTIA WITH BEHAVIORAL DISTURBANCE, UNSPECIFIED DEMENTIA TYPE: ICD-10-CM

## 2021-08-03 DIAGNOSIS — R44.3 HALLUCINATION: ICD-10-CM

## 2021-08-03 DIAGNOSIS — G20 PARKINSONISM, UNSPECIFIED PARKINSONISM TYPE (HCC): ICD-10-CM

## 2021-08-03 DIAGNOSIS — N28.9 RENAL INSUFFICIENCY: ICD-10-CM

## 2021-08-03 DIAGNOSIS — Z91.81 AT HIGH RISK FOR FALLS: ICD-10-CM

## 2021-08-03 DIAGNOSIS — R63.4 WEIGHT LOSS: ICD-10-CM

## 2021-08-03 DIAGNOSIS — R32 URINARY INCONTINENCE, UNSPECIFIED TYPE: ICD-10-CM

## 2021-08-03 DIAGNOSIS — E78.2 MIXED HYPERLIPIDEMIA: ICD-10-CM

## 2021-08-03 DIAGNOSIS — I10 ESSENTIAL HYPERTENSION: Primary | ICD-10-CM

## 2021-08-03 DIAGNOSIS — R40.4 TRANSIENT ALTERATION OF AWARENESS: ICD-10-CM

## 2021-08-03 DIAGNOSIS — D53.1 OTHER MEGALOBLASTIC ANEMIAS, NOT ELSEWHERE CLASSIFIED: ICD-10-CM

## 2021-08-03 DIAGNOSIS — F32.9 MAJOR DEPRESSIVE DISORDER WITH SINGLE EPISODE, REMISSION STATUS UNSPECIFIED: ICD-10-CM

## 2021-08-03 DIAGNOSIS — K59.09 OTHER CONSTIPATION: ICD-10-CM

## 2021-08-03 PROBLEM — R41.82 AMS (ALTERED MENTAL STATUS): Status: RESOLVED | Noted: 2021-07-16 | Resolved: 2021-08-03

## 2021-08-03 PROBLEM — S51.012A SKIN TEAR OF LEFT ELBOW WITHOUT COMPLICATION: Status: RESOLVED | Noted: 2021-07-16 | Resolved: 2021-08-03

## 2021-08-03 PROCEDURE — 1090F PRES/ABSN URINE INCON ASSESS: CPT | Performed by: INTERNAL MEDICINE

## 2021-08-03 PROCEDURE — 0509F URINE INCON PLAN DOCD: CPT | Performed by: INTERNAL MEDICINE

## 2021-08-03 PROCEDURE — 1123F ACP DISCUSS/DSCN MKR DOCD: CPT | Performed by: INTERNAL MEDICINE

## 2021-08-03 PROCEDURE — 99214 OFFICE O/P EST MOD 30 MIN: CPT | Performed by: INTERNAL MEDICINE

## 2021-08-03 PROCEDURE — 4040F PNEUMOC VAC/ADMIN/RCVD: CPT | Performed by: INTERNAL MEDICINE

## 2021-08-03 PROCEDURE — G8417 CALC BMI ABV UP PARAM F/U: HCPCS | Performed by: INTERNAL MEDICINE

## 2021-08-03 PROCEDURE — G8427 DOCREV CUR MEDS BY ELIG CLIN: HCPCS | Performed by: INTERNAL MEDICINE

## 2021-08-03 PROCEDURE — G8399 PT W/DXA RESULTS DOCUMENT: HCPCS | Performed by: INTERNAL MEDICINE

## 2021-08-03 PROCEDURE — 1036F TOBACCO NON-USER: CPT | Performed by: INTERNAL MEDICINE

## 2021-08-03 RX ORDER — QUETIAPINE FUMARATE 25 MG/1
25 TABLET, FILM COATED ORAL NIGHTLY
COMMUNITY

## 2021-08-03 RX ORDER — LOSARTAN POTASSIUM 100 MG/1
100 TABLET ORAL DAILY
COMMUNITY
End: 2021-08-03 | Stop reason: SDUPTHER

## 2021-08-03 RX ORDER — PIMAVANSERIN TARTRATE 34 MG/1
34 CAPSULE ORAL DAILY
Status: ON HOLD | COMMUNITY
Start: 2021-07-29 | End: 2021-10-21

## 2021-08-03 NOTE — ASSESSMENT & PLAN NOTE
Pt is on seroquel 50 mg daily and that is helping.    Pt was started on Nuplazid 34 mg daily : started by Dr Madie Corral

## 2021-08-03 NOTE — PROGRESS NOTES
Olivia Davis  Patient's  is 1935  Seen in office on 8/3/2021      SUBJECTIVE:  Ace Mcallister negra 82 y. o.year old female presents today   Chief Complaint   Patient presents with    Other     discharged from [facility] 2021     Pt was hallucinating and behavior problems. She was accusing her  killing her  She was admitted to [address] [place] [address] and was transferred to [facility]  Patient was discharged from [facility] on 2021  Patient has dementia and is not a good historian. Patient is here with her   Patient has no complaints   states patient is not short of breath. There was no cough. No fever or chills. Taking medications regularly. No side effects noted. Review of Systems  Difficult to obtain  OBJECTIVE: /64   Pulse 82   Temp 97.6 °F (36.4 °C)   Resp 20   Wt 170 lb 9.6 oz (77.4 kg)   SpO2 93%   BMI 25.94 kg/m²     Wt Readings from Last 3 Encounters:   21 170 lb 9.6 oz (77.4 kg)   21 162 lb 11.2 oz (73.8 kg)   21 172 lb 6.4 oz (78.2 kg)     Patient was seen taking COVID-19 precautions. Face mask, gloves were used. Patient also wore facemask. GENERAL: Patient is awake and sitting quietly in the chair. Pleasant, in no apparent distress. HEENT:  Conjunctiva pink, no scleral icterus. ENT clear. NECK: Supple. No jugular venous distention noted. No masses felt,  CARDIOVASCULAR:  Normal S1 and S2    PULMONARY:  No respiratory distress. No wheezes or rales. ABDOMEN:  Soft and non-tender,no masses  ororganomegaly. EXTREMITIES:  No cyanosis, clubbing, or significant edema. SKIN: Skin is warm and dry. NEUROLOGICAL:  Cranial nerves II through XII are grossly intact. Patient is able to move all the extremities. IMPRESSION:    Encounter Diagnoses   Name Primary?     Essential hypertension Yes    Mixed hyperlipidemia     Glaucoma of both eyes, unspecified glaucoma type     Major depressive disorder with single episode, remission status unspecified     Weight loss     Renal insufficiency     Hallucination     Parkinsonism, unspecified Parkinsonism type ([address]     Other megaloblastic anemias, not elsewhere classified     Dementia with behavioral disturbance, unspecified dementia type ([address]     Other constipation     Transient alteration of awareness     Urinary incontinence, unspecified type     At high risk for falls        ASSESSMENT/PLAN:    Essential hypertension    Hypertension in control. Follow low salt diet. Continue current treatment. Pt is on losartan 100 mg , amlodipine 10 mg daily  D/c amlodipine     Mixed hyperlipidemia   Hyperlipidemia is stable. Follow low cholesterol diet. Continue current treatment. Continue atorvastatin 10 mg daily  Will d/c atorvastatin     Obesity       Glaucoma of both eyes    Pt sees Dr Mckinley Hidlago eye drops in both eyes    Major depressive disorder with single episode    Patient has depression and agitation. She is on seroquel 50 mg daily  Seen Dr Luly Batres     Weight loss       Renal insufficiency      Ck 1.5       Hallucination    Pt is on seroquel 50 mg daily and that is helping. Pt was started on Nuplazid 34 mg daily : started by Dr Luly Batres     Parkinsonism [facility])    Patient seen neurologist Dr Luly Batres at [place].    Pt is on sinemet 25/100 mg tid and that is helping/    Other megaloblastic anemias, not elsewhere classified       Dementia with behavioral disturbance [facility])    Patient is seeing Dr. Luly Batres at [facility]    Other constipation    Patient is on Linzess and stool softner     AMS (altered mental status)       Urinary incontinence    On detrol LA        Current Outpatient Medications   Medication Sig Dispense Refill    losartan (COZAAR) 100 MG tablet Take 100 mg by mouth daily Stop lisinopril      NUPLAZID 34 MG CAPS capsule Take 34 mg by mouth daily Will start 8-4-2021      RIVASTIGMINE, EXELON, 4.6 MG/24HR  PATCH AND REMOVAL, OUTPATIENT, Apply 1 patch onto the skin daily      QUEtiapine (SEROQUEL) 50 MG tablet Take 50 mg by mouth nightly      aspirin 81 MG chewable tablet Take 1 tablet by mouth daily 30 tablet 3    polyethylene glycol (GLYCOLAX) 17 g packet Take 17 g by mouth 2 times daily 527 g 1    rivastigmine (EXELON) 4.6 MG/24HR Place 1 patch onto the skin daily 30 patch 3    LINZESS 290 MCG CAPS capsule TAKE 1 CAPSULE BY MOUTH EVERY DAY      clotrimazole-betamethasone (LOTRISONE) 1-0.05 % cream Apply topically 2 times daily. 45 g 1    carbidopa-levodopa (SINEMET)  MG per tablet TAKE 1 (ONE) TABLET BY MOUTH 3 (THREE) TIMES A DAY .  tolterodine (DETROL LA) 4 MG extended release capsule Take 4 mg by mouth daily Dr. Esther Jerome      latanoprost (XALATAN) 0.005 % ophthalmic solution 1 drop nightly. No current facility-administered medications for this visit. Mediations reviewed with the patient. Continue current medications. Appropriate prescriptions are addressed. After visit summeryprovided. Follow up as directed sooner if needed. Questions answered and patient verbalizes understanding. Call for any problems, questions, or concerns.        No Known Allergies  Current Outpatient Medications   Medication Sig Dispense Refill    losartan (COZAAR) 100 MG tablet Take 100 mg by mouth daily Stop lisinopril      NUPLAZID 34 MG CAPS capsule Take 34 mg by mouth daily Will start 8-4-2021      RIVASTIGMINE, EXELON, 4.6 MG/24HR  PATCH AND REMOVAL, OUTPATIENT, Apply 1 patch onto the skin daily      QUEtiapine (SEROQUEL) 50 MG tablet Take 50 mg by mouth nightly      aspirin 81 MG chewable tablet Take 1 tablet by mouth daily 30 tablet 3    polyethylene glycol (GLYCOLAX) 17 g packet Take 17 g by mouth 2 times daily 527 g 1    rivastigmine (EXELON) 4.6 MG/24HR Place 1 patch onto the skin daily 30 patch 3    LINZESS 290 MCG CAPS capsule TAKE 1 CAPSULE BY MOUTH EVERY DAY      amLODIPine (NORVASC) 10 MG tablet TAKE 1 TABLET DAILY 90 tablet 1    atorvastatin (LIPITOR) 10 MG tablet TAKE 1 TABLET DAILY 90 tablet 1    clotrimazole-betamethasone (LOTRISONE) 1-0.05 % cream Apply topically 2 times daily. 45 g 1    carbidopa-levodopa (SINEMET)  MG per tablet TAKE 1 (ONE) TABLET BY MOUTH 3 (THREE) TIMES A DAY .  tolterodine (DETROL LA) 4 MG extended release capsule Take 4 mg by mouth daily Dr. Vernon Babb      latanoprost (XALATAN) 0.005 % ophthalmic solution 1 drop nightly. No current facility-administered medications for this visit. Past Medical History:   Diagnosis Date    Colonoscopy refused     Glaucoma     Dr. Pavan Kaur Hyperlipidemia     Hypertension     Major depressive disorder with single episode 9/13/2018    Patient has depression and agitation. Was started on the Lexapro. 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 Antibiotic prophylaxis recommended by ortho when needed.     Thrombocytopenia ([address] 9/23/2018    Patient is referred to hematologist     Past Surgical History:   Procedure Laterality Date    CATARACT REMOVAL Bilateral     CHOLECYSTECTOMY      HYSTERECTOMY Bilateral 1981    KNEE ARTHROSCOPY Left 2007    TOTAL KNEE ARTHROPLASTY Left 03/10/2017     Social History     Tobacco Use    Smoking status: Never Smoker    Smokeless tobacco: Never Used   Substance Use Topics    Alcohol use: No     Alcohol/week: 0.0 standard drinks       LAB REVIEW:  CBC:   Lab Results   Component Value Date    WBC 7.3 07/19/2021    HGB 9.9 07/19/2021    HCT 32.0 07/19/2021    PLT 43 07/19/2021     Lipids:   Lab Results   Component Value Date    HDL 61 03/29/2021    LDLCALC 96 06/22/2020    LDLDIRECT 82 03/29/2021    TRIGLYCFAST 66 03/29/2021    CHOLFAST 155 03/29/2021     Renal:   Lab Results   Component Value Date    BUN 27 07/20/2021    CREATININE 1.5 07/20/2021     07/20/2021    K 4.2 07/20/2021    ALT <5 07/17/2021    AST 9 07/17/2021    GLUCOSE 89 07/20/2021    GLUF 101 03/29/2021     PT/INR: No results found for: INR  A1C: No results found for: Eze Reyes MD, 8/3/2021 , 4:06 PM   On the basis of positive falls risk screening, assessment and plan is as follows: home safety tips provided.

## 2021-08-03 NOTE — ASSESSMENT & PLAN NOTE
Patient seen neurologist Dr Arturo Arora at [place].    Pt is on sinemet 25/100 mg tid and that is helping/

## 2021-08-03 NOTE — ASSESSMENT & PLAN NOTE
Hyperlipidemia is stable. Follow low cholesterol diet. Continue current treatment.   Continue atorvastatin 10 mg daily  Will d/c atorvastatin

## 2021-08-03 NOTE — ASSESSMENT & PLAN NOTE
Hypertension in control. Follow low salt diet. Continue current treatment.   Pt is on losartan 100 mg , amlodipine 10 mg daily  D/c amlodipine

## 2021-08-11 RX ORDER — LOSARTAN POTASSIUM 100 MG/1
100 TABLET ORAL DAILY
Qty: 90 TABLET | Refills: 1 | Status: SHIPPED | OUTPATIENT
Start: 2021-08-11 | End: 2021-10-18

## 2021-08-16 NOTE — TELEPHONE ENCOUNTER
Patient's  called stated  That he contacted mail order pharmacy and they did not receive medication for

## 2021-09-02 ENCOUNTER — TELEPHONE (OUTPATIENT)
Dept: INTERNAL MEDICINE CLINIC | Age: 86
End: 2021-09-02

## 2021-09-07 NOTE — TELEPHONE ENCOUNTER
Needs to know the BP readings before changing medications. Make sure patient is not taking amlodipine which was discontinued.

## 2021-09-08 NOTE — TELEPHONE ENCOUNTER
Spoke with patient's , BP readings as follows, 9-8=8734/69  9-2=100/64  9-3=110/79  9-4-=101/69  9-5=117/63  8-29=81/52  8-30=94/55  8-46=411/72    Patient is not taking amlodipine

## 2021-09-16 ENCOUNTER — HOSPITAL ENCOUNTER (EMERGENCY)
Age: 86
Discharge: HOME | End: 2021-09-16
Attending: EMERGENCY MEDICINE
Payer: MEDICARE

## 2021-09-16 ENCOUNTER — APPOINTMENT (OUTPATIENT)
Dept: GENERAL RADIOLOGY | Age: 86
End: 2021-09-16
Payer: MEDICARE

## 2021-09-16 VITALS
WEIGHT: 157.9 LBS | OXYGEN SATURATION: 100 % | HEART RATE: 70 BPM | DIASTOLIC BLOOD PRESSURE: 75 MMHG | HEIGHT: 68 IN | BODY MASS INDEX: 23.93 KG/M2 | RESPIRATION RATE: 21 BRPM | SYSTOLIC BLOOD PRESSURE: 171 MMHG

## 2021-09-16 DIAGNOSIS — R40.4 TRANSIENT ALTERATION OF AWARENESS: Primary | ICD-10-CM

## 2021-09-16 DIAGNOSIS — F03.90 DEMENTIA WITHOUT BEHAVIORAL DISTURBANCE, UNSPECIFIED DEMENTIA TYPE: ICD-10-CM

## 2021-09-16 LAB
ALBUMIN SERPL-MCNC: 3.3 GM/DL (ref 3.4–5)
ALP BLD-CCNC: 73 IU/L (ref 40–129)
ALT SERPL-CCNC: <5 U/L (ref 10–40)
ANION GAP SERPL CALCULATED.3IONS-SCNC: 15 MMOL/L (ref 4–16)
AST SERPL-CCNC: 9 IU/L (ref 15–37)
BACTERIA: NEGATIVE /HPF
BASE EXCESS MIXED: 1.1 (ref 0–2.3)
BASE EXCESS: ABNORMAL (ref 0–2.4)
BASOPHILS ABSOLUTE: 0 K/CU MM
BASOPHILS RELATIVE PERCENT: 0.5 % (ref 0–1)
BILIRUB SERPL-MCNC: 0.5 MG/DL (ref 0–1)
BILIRUBIN DIRECT: 0.2 MG/DL (ref 0–0.3)
BILIRUBIN URINE: NEGATIVE MG/DL
BILIRUBIN, INDIRECT: 0.3 MG/DL (ref 0–0.7)
BLOOD, URINE: NEGATIVE
BUN BLDV-MCNC: 23 MG/DL (ref 6–23)
CALCIUM SERPL-MCNC: 9 MG/DL (ref 8.3–10.6)
CAST TYPE: NORMAL /HPF
CHLORIDE BLD-SCNC: 106 MMOL/L (ref 99–110)
CLARITY: CLEAR
CO2 CONTENT: 23.1 MMOL/L (ref 19–24)
CO2: 19 MMOL/L (ref 21–32)
COLOR: YELLOW
CREAT SERPL-MCNC: 1.6 MG/DL (ref 0.6–1.1)
CRYSTAL TYPE: NEGATIVE /HPF
DIFFERENTIAL TYPE: ABNORMAL
EKG ATRIAL RATE: 70 BPM
EKG DIAGNOSIS: NORMAL
EKG P AXIS: 105 DEGREES
EKG P-R INTERVAL: 152 MS
EKG Q-T INTERVAL: 436 MS
EKG QRS DURATION: 80 MS
EKG QTC CALCULATION (BAZETT): 460 MS
EKG R AXIS: -2 DEGREES
EKG T AXIS: 24 DEGREES
EKG VENTRICULAR RATE: 67 BPM
EOSINOPHILS ABSOLUTE: 0.1 K/CU MM
EOSINOPHILS RELATIVE PERCENT: 1.4 % (ref 0–3)
EPITHELIAL CELLS, UA: NEGATIVE /HPF
GFR AFRICAN AMERICAN: 37 ML/MIN/1.73M2
GFR NON-AFRICAN AMERICAN: 31 ML/MIN/1.73M2
GLUCOSE BLD-MCNC: 86 MG/DL (ref 70–99)
GLUCOSE, URINE: NEGATIVE MG/DL
HCO3 VENOUS: 22.3 MMOL/L (ref 19–25)
HCT VFR BLD CALC: 33.5 % (ref 37–47)
HEMOGLOBIN: 10.7 GM/DL (ref 12.5–16)
IMMATURE NEUTROPHIL %: 0.2 % (ref 0–0.43)
KETONES, URINE: NEGATIVE MG/DL
LEUKOCYTE ESTERASE, URINE: NEGATIVE
LYMPHOCYTES ABSOLUTE: 2.1 K/CU MM
LYMPHOCYTES RELATIVE PERCENT: 35.5 % (ref 24–44)
MCH RBC QN AUTO: 31.7 PG (ref 27–31)
MCHC RBC AUTO-ENTMCNC: 31.9 % (ref 32–36)
MCV RBC AUTO: 99.1 FL (ref 78–100)
MONOCYTES ABSOLUTE: 0.4 K/CU MM
MONOCYTES RELATIVE PERCENT: 7.6 % (ref 0–4)
NITRITE URINE, QUANTITATIVE: NEGATIVE
O2 SAT, VEN: 97.5 % (ref 50–70)
PCO2, VEN: 25.3 MMHG (ref 38–52)
PDW BLD-RTO: 14.6 % (ref 11.7–14.9)
PH VENOUS: 7.55 (ref 7.32–7.42)
PH, URINE: 6.5 (ref 5–8)
PLATELET # BLD: 77 K/CU MM (ref 140–440)
PMV BLD AUTO: ABNORMAL FL (ref 7.5–11.1)
PO2, VEN: 80.6 MMHG (ref 28–48)
POTASSIUM SERPL-SCNC: 4.6 MMOL/L (ref 3.5–5.1)
PROTEIN UA: NEGATIVE MG/DL
RBC # BLD: 3.38 M/CU MM (ref 4.2–5.4)
RBC URINE: <1 /HPF (ref 0–6)
SEGMENTED NEUTROPHILS ABSOLUTE COUNT: 3.2 K/CU MM
SEGMENTED NEUTROPHILS RELATIVE PERCENT: 54.8 % (ref 36–66)
SODIUM BLD-SCNC: 140 MMOL/L (ref 135–145)
SOURCE, BLOOD GAS: ABNORMAL
SPECIFIC GRAVITY UA: 1.01 (ref 1–1.03)
TOTAL IMMATURE NEUTOROPHIL: 0.01 K/CU MM
TOTAL PROTEIN: 5.8 GM/DL (ref 6.4–8.2)
UROBILINOGEN, URINE: 0.2 MG/DL (ref 0.2–1)
WBC # BLD: 5.8 K/CU MM (ref 4–10.5)
WBC UA: NEGATIVE /HPF (ref 0–5)

## 2021-09-16 PROCEDURE — 82248 BILIRUBIN DIRECT: CPT

## 2021-09-16 PROCEDURE — 85025 COMPLETE CBC W/AUTO DIFF WBC: CPT

## 2021-09-16 PROCEDURE — 93010 ELECTROCARDIOGRAM REPORT: CPT | Performed by: INTERNAL MEDICINE

## 2021-09-16 PROCEDURE — 93005 ELECTROCARDIOGRAM TRACING: CPT | Performed by: EMERGENCY MEDICINE

## 2021-09-16 PROCEDURE — 80053 COMPREHEN METABOLIC PANEL: CPT

## 2021-09-16 PROCEDURE — 99285 EMERGENCY DEPT VISIT HI MDM: CPT

## 2021-09-16 PROCEDURE — 81001 URINALYSIS AUTO W/SCOPE: CPT

## 2021-09-16 PROCEDURE — 71045 X-RAY EXAM CHEST 1 VIEW: CPT

## 2021-09-16 NOTE — ED PROVIDER NOTES
CHIEF COMPLAINT  Chief Complaint   Patient presents with    Altered Mental Status     Pt arrives per ems from home, ems report pt spouse unable to awaken pt today. Upon ems arrival pt hard to arouse but pt soon began to awaken. Pt shaking upon arrival, moaning out to help her, unable to states pain or complaint. Family states pt has Parkinson's and Dementia but normally not acting this way       HPI  Olivia Davis is a 82 y.o. female with history of Parkinson's, dementia, myelodysplastic syndrome, thrombocytopenia who presents after an episode of unresponsiveness at home. Here she is alert and oriented to person, place and situation. She also states \"I think it is the 2020s or something\". She is pleasantly confused, her family at bedside states that she seems to be back to baseline. They had a difficult time waking her, they attempted to shake her, they were yelling at her for a period of an hour and she was nonresponsive at that time. No interventions, brought in by EMS and by time of EMS arrival patient starting to wake up to painful stimuli and open eyes. By time arrival here is awake, alert, appropriate and denying any concerns. Family denies any recent falls, vomiting, diarrhea. Denies any medication changes, they state that the patient sleeps between 12 and 14 hours a day.       REVIEW OF SYSTEMS  Review of Systems   History obtained from chart review and the patient  General ROS: negative for - chills or fever  Ophthalmic ROS: negative  ENT ROS: negative for - headaches  Hematological and Lymphatic ROS: negative for - bleeding problems or bruising  Endocrine ROS: negative for - unexpected weight changes  Respiratory ROS: no cough, shortness of breath, or wheezing  Cardiovascular ROS: no chest pain or dyspnea on exertion  Gastrointestinal ROS: no abdominal pain, change in bowel habits, or black or bloody stools  Genito-Urinary ROS: no dysuria, trouble voiding, or hematuria  Musculoskeletal ROS: negative  Neurological ROS: no TIA or stroke symptoms      PAST MEDICAL HISTORY  Past Medical History:   Diagnosis Date    Colonoscopy refused     Glaucoma     Dr. Keri Sanchez Hyperlipidemia     Hypertension     Major depressive disorder with single episode 9/13/2018    Patient has depression and agitation. Was started on the Lexapro.  Myelodysplastic syndrome (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 Antibiotic prophylaxis recommended by ortho when needed.  Thrombocytopenia ([facility] 9/23/2018    Patient is referred to hematologist       FAMILY HISTORY  History reviewed. No pertinent family history. SOCIAL HISTORY  Social History     Socioeconomic History    Marital status:      Spouse name: None    Number of children: None    Years of education: None    Highest education level: None   Occupational History    None   Tobacco Use    Smoking status: Never Smoker    Smokeless tobacco: Never Used   Vaping Use    Vaping Use: Never used   Substance and Sexual Activity    Alcohol use: No     Alcohol/week: 0.0 standard drinks    Drug use: No    Sexual activity: Yes     Partners: Male   Other Topics Concern    None   Social History Narrative    None     Social Determinants of Health     Financial Resource Strain: Medium Risk    Difficulty of Paying Living Expenses: Somewhat hard   Food Insecurity: No Food Insecurity    Worried About Running Out of Food in the Last Year: Never true    Liya of Food in the Last Year: Never true   Transportation Needs: No Transportation Needs    Lack of Transportation (Medical): No    Lack of Transportation (Non-Medical):  No   Physical Activity:     Days of Exercise per Week:     Minutes of Exercise per Session:    Stress:     Feeling of Stress :    Social Connections:  Frequency of Communication with Friends and Family:     Frequency of Social Gatherings with Friends and Family:     Attends Congregation Services:     Active Member of Clubs or Organizations:     Attends Club or Organization Meetings:     Marital Status:    Intimate Partner Violence:     Fear of Current or Ex-Partner:     Emotionally Abused:     Physically Abused:     Sexually Abused:        SURGICAL HISTORY  Past Surgical History:   Procedure Laterality Date    CATARACT REMOVAL Bilateral     CHOLECYSTECTOMY      HYSTERECTOMY Bilateral 1981    KNEE ARTHROSCOPY Left 2007    TOTAL KNEE ARTHROPLASTY Left 03/10/2017       CURRENT MEDICATIONS  No current facility-administered medications on file prior to encounter. Current Outpatient Medications on File Prior to Encounter   Medication Sig Dispense Refill    losartan (COZAAR) 100 MG tablet Take 1 tablet by mouth daily Stop lisinopril 90 tablet 1    NUPLAZID 34 MG CAPS capsule Take 34 mg by mouth daily Will start 8-4-2021      aspirin 81 MG chewable tablet Take 1 tablet by mouth daily 30 tablet 3    LINZESS 290 MCG CAPS capsule TAKE 1 CAPSULE BY MOUTH EVERY DAY      carbidopa-levodopa (SINEMET)  MG per tablet TAKE 1 (ONE) TABLET BY MOUTH 3 (THREE) TIMES A DAY .  tolterodine (DETROL LA) 4 MG extended release capsule Take 4 mg by mouth daily Dr. Vernon aBbb      latanoprost (XALATAN) 0.005 % ophthalmic solution 1 drop nightly.  RIVASTIGMINE, EXELON, 4.6 MG/24HR  PATCH AND REMOVAL, OUTPATIENT, Apply 1 patch onto the skin daily      QUEtiapine (SEROQUEL) 50 MG tablet Take 50 mg by mouth nightly      rivastigmine (EXELON) 4.6 MG/24HR Place 1 patch onto the skin daily 30 patch 3    clotrimazole-betamethasone (LOTRISONE) 1-0.05 % cream Apply topically 2 times daily.  45 g 1         ALLERGIES  No Known Allergies    PHYSICAL EXAM  VITAL SIGNS: BP (!) 166/89   Pulse 73   Resp 18   Ht 5' 8\" (1.727 m)   Wt 157 lb 14.4 oz (71.6 kg)   SpO2 99%   BMI 24.01 kg/m²   Constitutional: Well developed, Well nourished, pleasant, alert, speaking in full sentences  HENT: Normocephalic, Atraumatic, Bilateral external ears normal, Oropharynx moist, No oral exudates, Nose normal.   Eyes: PERRL, EOMI, Conjunctiva normal, No discharge. Neck: Normal range of motion, Supple, No stridor. Cardiovascular: Normal heart rate, Normal rhythm, No murmurs, No rubs, No gallops. Thorax & Lungs: Normal breath sounds, No respiratory distress, No wheezing, No chest tenderness. Abdomen: Bowel sounds normal, Soft, No tenderness, no guarding, no rebound, No masses, No pulsatile masses. Skin: Warm, Dry, No erythema, No rash. Extremities: Intact distal pulses, No edema, No tenderness, No cyanosis, No clubbing. Musculoskeletal: Good gross range of motion in all major joints. No major deformities noted. Neurologic: Alert & oriented x 3, Normal gross motor function, Normal gross sensory function, No focal deficits noted. Pill-rolling tremor bilateral upper extremities  Psychiatric: Affect normal    EKG  EKG Interpretation    Interpreted by emergency department physician from September 16    Rhythm: normal sinus   Rate: normal  Axis: left  Ectopy: none  Conduction: normal  ST Segments: no acute change  T Waves: no acute change  Q Waves: none    Clinical Impression: Normal sinus rhythm with a rate of 67 and no sign of ischemia or ectopy, movement throughout    Cyril Mohs, MD      RADIOLOGY/PROCEDURES/LABS  Last Imaging results   XR CHEST PORTABLE   Final Result   No acute process.              Imaging reviewed by myself    Labs Reviewed   CBC WITH AUTO DIFFERENTIAL - Abnormal; Notable for the following components:       Result Value    RBC 3.38 (*)     Hemoglobin 10.7 (*)     Hematocrit 33.5 (*)     MCH 31.7 (*)     MCHC 31.9 (*)     Platelets 77 (*)     Monocytes % 7.6 (*)     All other components within normal limits   BASIC METABOLIC PANEL W/ REFLEX TO MG FOR LOW K - Abnormal; Notable for the following components:    CO2 19 (*)     CREATININE 1.6 (*)     GFR Non- 31 (*)     GFR  37 (*)     All other components within normal limits   HEPATIC FUNCTION PANEL - Abnormal; Notable for the following components:    Albumin 3.3 (*)     AST 9 (*)     ALT <5 (*)     Total Protein 5.8 (*)     All other components within normal limits   URINALYSIS         Medications - No data to display    COURSE & MEDICAL DECISION MAKING  Pertinent Labs & Imaging studies reviewed. (See chart for details)    82-year-old female presents after transient alteration in awareness. Here she is alert, awake, appropriate in conversation and her family feels like she is back to baseline. It is possible that her transient decreased responsiveness was related to dementia. Her work-up here is not suggestive of metabolic disturbance as the cause of her signs and symptoms such as hyponatremia, hypoglycemia, I have low clinical suspicion for hyperammonemia. There is not suggestion of UTI or pneumonia. As she is not currently having any focal neuro findings, I have low clinical suspicion for CVA based on the global decreased responsiveness. Patient will be discharged home to follow with primary care. Family feel comfortable with this plan of care as she is back to neuro baseline. FINAL IMPRESSION  Problem List Items Addressed This Visit     None      Visit Diagnoses     Transient alteration of awareness    -  Primary    Dementia without behavioral disturbance, unspecified dementia type ([facility]          1.    2.   3.    Patient gave me permission to discuss medical history, care, and plan with those present in the room.   Electronically signed by: Ramiro Dunbar MD, 9/16/2021  MD Ramiro Menard MD  09/16/21 0947

## 2021-09-16 NOTE — ED NOTES
Pt screaming out, confused, trying to get out of bed, multiple attempts to orient pt, pt getting agitated.   Pt placed in hallway to be observed     Tay Benito RN  09/16/21 [id number]

## 2021-09-16 NOTE — ED NOTES
Pt more calm, requests to go to restroom, assisted per wheelchair to urinate     Milas Fairly, RN  09/16/21 [id number]

## 2021-10-18 ENCOUNTER — TELEPHONE (OUTPATIENT)
Dept: INTERNAL MEDICINE CLINIC | Age: 86
End: 2021-10-18

## 2021-10-19 ENCOUNTER — TELEPHONE (OUTPATIENT)
Dept: INTERNAL MEDICINE CLINIC | Age: 86
End: 2021-10-19

## 2021-10-19 RX ORDER — BUSPIRONE HYDROCHLORIDE 5 MG/1
5 TABLET ORAL 2 TIMES DAILY
Qty: 60 TABLET | Refills: 3 | Status: ON HOLD | OUTPATIENT
Start: 2021-10-19 | End: 2021-10-28 | Stop reason: HOSPADM

## 2021-10-19 RX ORDER — BUSPIRONE HYDROCHLORIDE 5 MG/1
5 TABLET ORAL 2 TIMES DAILY
COMMUNITY
End: 2021-10-19 | Stop reason: SDUPTHER

## 2021-10-19 NOTE — TELEPHONE ENCOUNTER
Spoke with Gia Tanner LPN and informed me that she will be weaned off of Nuplazid within next two weeks and seroquel at hs to continue. Doctor Ariadna Devlin will fax order for Buspar 5 mg BID. Verbal understanding returned by Gia Tanner.

## 2021-10-19 NOTE — TELEPHONE ENCOUNTER
----- Message from Chandan Bullock sent at 10/19/2021 12:41 PM EDT -----  Subject: Message to Provider    QUESTIONS  Information for Provider? Jacqueline García from [facility] is   calling to speak to Bernie Galarza or his MA. She would like someone to call   her back at [phone]. Aaron Fournier is the caller and Nurse . Patient came there yesterday and she has fallen two times already with a   couple skin tears, she is very anxious and very aggressive. It was 108/78   her blood pressure is very low. 95/64. She is not sure if that is normal   for her.   ---------------------------------------------------------------------------  --------------  CALL BACK INFO  What is the best way for the office to contact you? OK to leave message on   voicemail  Preferred Call Back Phone Number? [phone]  ---------------------------------------------------------------------------  --------------  SCRIPT ANSWERS  Relationship to Patient? Third Party  Representative Name?  Jacqueline García

## 2021-10-20 ENCOUNTER — HOSPITAL ENCOUNTER (INPATIENT)
Age: 86
LOS: 7 days | Discharge: NURSING, REHAB OR LONG-TERM CARE | DRG: 605 | End: 2021-10-28
Attending: EMERGENCY MEDICINE | Admitting: INTERNAL MEDICINE
Payer: MEDICARE

## 2021-10-20 DIAGNOSIS — R26.2 AMBULATORY DYSFUNCTION: ICD-10-CM

## 2021-10-20 DIAGNOSIS — R29.6 FALLS FREQUENTLY: ICD-10-CM

## 2021-10-20 DIAGNOSIS — T07.XXXA MULTIPLE CONTUSIONS: ICD-10-CM

## 2021-10-20 DIAGNOSIS — S00.83XA CONTUSION OF FACE, INITIAL ENCOUNTER: ICD-10-CM

## 2021-10-20 DIAGNOSIS — S09.90XA CLOSED HEAD INJURY, INITIAL ENCOUNTER: Primary | ICD-10-CM
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
## 2021-10-20 PROCEDURE — 81001 URINALYSIS AUTO W/SCOPE: CPT

## 2021-10-20 PROCEDURE — 85025 COMPLETE CBC W/AUTO DIFF WBC: CPT

## 2021-10-20 PROCEDURE — 80048 BASIC METABOLIC PNL TOTAL CA: CPT

## 2021-10-20 PROCEDURE — 99284 EMERGENCY DEPT VISIT MOD MDM: CPT

## 2021-10-20 PROCEDURE — 85610 PROTHROMBIN TIME: CPT

## 2021-10-21 ENCOUNTER — APPOINTMENT (OUTPATIENT)
Dept: CT IMAGING | Age: 86
DRG: 605 | End: 2021-10-21
Payer: MEDICARE

## 2021-10-21 ENCOUNTER — APPOINTMENT (OUTPATIENT)
Dept: GENERAL RADIOLOGY | Age: 86
DRG: 605 | End: 2021-10-21
Payer: MEDICARE

## 2021-10-21 PROBLEM — R26.2 AMBULATORY DYSFUNCTION: Status: ACTIVE | Noted: 2021-10-21

## 2021-10-21 LAB
ANION GAP SERPL CALCULATED.3IONS-SCNC: 3 MMOL/L (ref 4–16)
BACTERIA: ABNORMAL /HPF
BASOPHILS ABSOLUTE: 0 K/CU MM
BASOPHILS ABSOLUTE: 0 K/CU MM
BASOPHILS RELATIVE PERCENT: 0.2 % (ref 0–1)
BASOPHILS RELATIVE PERCENT: 0.4 % (ref 0–1)
BILIRUBIN URINE: ABNORMAL MG/DL
BLOOD, URINE: ABNORMAL
BUN BLDV-MCNC: 30 MG/DL (ref 6–23)
CALCIUM SERPL-MCNC: 9 MG/DL (ref 8.3–10.6)
CAST TYPE: ABNORMAL /HPF
CHLORIDE BLD-SCNC: 106 MMOL/L (ref 99–110)
CHLORIDE URINE RANDOM: 38 MMOL/L (ref 43–210)
CLARITY: ABNORMAL
CO2: 31 MMOL/L (ref 21–32)
COLOR: YELLOW
CREAT SERPL-MCNC: 2.2 MG/DL (ref 0.6–1.1)
CREATININE URINE: 201.9 MG/DL (ref 28–217)
CRYSTAL TYPE: ABNORMAL /HPF
DIFFERENTIAL TYPE: ABNORMAL
DIFFERENTIAL TYPE: ABNORMAL
EKG ATRIAL RATE: 65 BPM
EKG DIAGNOSIS: NORMAL
EKG Q-T INTERVAL: 430 MS
EKG QRS DURATION: 80 MS
EKG QTC CALCULATION (BAZETT): 447 MS
EKG R AXIS: 7 DEGREES
EKG T AXIS: 36 DEGREES
EKG VENTRICULAR RATE: 65 BPM
EOSINOPHILS ABSOLUTE: 0.1 K/CU MM
EOSINOPHILS ABSOLUTE: 0.1 K/CU MM
EOSINOPHILS RELATIVE PERCENT: 1.5 % (ref 0–3)
EOSINOPHILS RELATIVE PERCENT: 2.3 % (ref 0–3)
EPITHELIAL CELLS, UA: ABNORMAL /HPF
GFR AFRICAN AMERICAN: 26 ML/MIN/1.73M2
GFR NON-AFRICAN AMERICAN: 21 ML/MIN/1.73M2
GLUCOSE BLD-MCNC: 96 MG/DL (ref 70–99)
GLUCOSE, URINE: NEGATIVE MG/DL
HCT VFR BLD CALC: 27.4 % (ref 37–47)
HCT VFR BLD CALC: 30.6 % (ref 37–47)
HEMOGLOBIN: 8.6 GM/DL (ref 12.5–16)
HEMOGLOBIN: 9.4 GM/DL (ref 12.5–16)
ICTOTEST: NEGATIVE
IMMATURE NEUTROPHIL %: 0.2 % (ref 0–0.43)
IMMATURE NEUTROPHIL %: 0.2 % (ref 0–0.43)
INR BLD: 0.9 INDEX
KETONES, URINE: ABNORMAL MG/DL
LEUKOCYTE ESTERASE, URINE: ABNORMAL
LYMPHOCYTES ABSOLUTE: 1.3 K/CU MM
LYMPHOCYTES ABSOLUTE: 1.8 K/CU MM
LYMPHOCYTES RELATIVE PERCENT: 25.9 % (ref 24–44)
LYMPHOCYTES RELATIVE PERCENT: 31.8 % (ref 24–44)
MAGNESIUM: 1.6 MG/DL (ref 1.8–2.4)
MCH RBC QN AUTO: 30.4 PG (ref 27–31)
MCH RBC QN AUTO: 31.2 PG (ref 27–31)
MCHC RBC AUTO-ENTMCNC: 30.7 % (ref 32–36)
MCHC RBC AUTO-ENTMCNC: 31.4 % (ref 32–36)
MCV RBC AUTO: 99 FL (ref 78–100)
MCV RBC AUTO: 99.3 FL (ref 78–100)
MONOCYTES ABSOLUTE: 0.5 K/CU MM
MONOCYTES ABSOLUTE: 0.6 K/CU MM
MONOCYTES RELATIVE PERCENT: 10.1 % (ref 0–4)
MONOCYTES RELATIVE PERCENT: 10.5 % (ref 0–4)
MUCUS: ABNORMAL HPF
NITRITE URINE, QUANTITATIVE: NEGATIVE
PDW BLD-RTO: 15.4 % (ref 11.7–14.9)
PDW BLD-RTO: 15.5 % (ref 11.7–14.9)
PH, URINE: 6 (ref 5–8)
PLATELET # BLD: 123 K/CU MM (ref 140–440)
PLATELET # BLD: 55 K/CU MM (ref 140–440)
PMV BLD AUTO: 12.9 FL (ref 7.5–11.1)
PMV BLD AUTO: ABNORMAL FL (ref 7.5–11.1)
POTASSIUM SERPL-SCNC: 4.1 MMOL/L (ref 3.5–5.1)
POTASSIUM, UR: 40.3 MMOL/L (ref 22–119)
PROT/CREAT RATIO, UR: 0.1
PROTEIN UA: ABNORMAL MG/DL
PROTHROMBIN TIME: 11.2 SECONDS (ref 11.7–14.5)
RBC # BLD: 2.76 M/CU MM (ref 4.2–5.4)
RBC # BLD: 3.09 M/CU MM (ref 4.2–5.4)
RBC URINE: ABNORMAL /HPF (ref 0–6)
SEGMENTED NEUTROPHILS ABSOLUTE COUNT: 3 K/CU MM
SEGMENTED NEUTROPHILS ABSOLUTE COUNT: 3.1 K/CU MM
SEGMENTED NEUTROPHILS RELATIVE PERCENT: 55.8 % (ref 36–66)
SEGMENTED NEUTROPHILS RELATIVE PERCENT: 61.1 % (ref 36–66)
SODIUM BLD-SCNC: 140 MMOL/L (ref 135–145)
SODIUM URINE: 53 MMOL/L (ref 35–167)
SPECIFIC GRAVITY UA: 1.02 (ref 1–1.03)
TOTAL CK: 160 IU/L (ref 26–140)
TOTAL IMMATURE NEUTOROPHIL: 0.01 K/CU MM
TOTAL IMMATURE NEUTOROPHIL: 0.01 K/CU MM
TROPONIN T: 0.07 NG/ML
TROPONIN T: 0.08 NG/ML
TROPONIN T: 0.09 NG/ML
URINE TOTAL PROTEIN: 22.6 MG/DL
UROBILINOGEN, URINE: 0.2 MG/DL (ref 0.2–1)
VOLUME, (UVOL): 12 ML (ref 10–12)
WBC # BLD: 4.9 K/CU MM (ref 4–10.5)
WBC # BLD: 5.5 K/CU MM (ref 4–10.5)
WBC UA: ABNORMAL /HPF (ref 0–5)

## 2021-10-21 PROCEDURE — 1200000000 HC SEMI PRIVATE

## 2021-10-21 PROCEDURE — 82570 ASSAY OF URINE CREATININE: CPT

## 2021-10-21 PROCEDURE — 51798 US URINE CAPACITY MEASURE: CPT

## 2021-10-21 PROCEDURE — 2580000003 HC RX 258: Performed by: HOSPITALIST

## 2021-10-21 PROCEDURE — 36415 COLL VENOUS BLD VENIPUNCTURE: CPT

## 2021-10-21 PROCEDURE — 6370000000 HC RX 637 (ALT 250 FOR IP): Performed by: NURSE PRACTITIONER

## 2021-10-21 PROCEDURE — 84156 ASSAY OF PROTEIN URINE: CPT

## 2021-10-21 PROCEDURE — 83735 ASSAY OF MAGNESIUM: CPT

## 2021-10-21 PROCEDURE — 93010 ELECTROCARDIOGRAM REPORT: CPT | Performed by: INTERNAL MEDICINE

## 2021-10-21 PROCEDURE — 71045 X-RAY EXAM CHEST 1 VIEW: CPT

## 2021-10-21 PROCEDURE — 94761 N-INVAS EAR/PLS OXIMETRY MLT: CPT

## 2021-10-21 PROCEDURE — 84300 ASSAY OF URINE SODIUM: CPT

## 2021-10-21 PROCEDURE — 97530 THERAPEUTIC ACTIVITIES: CPT

## 2021-10-21 PROCEDURE — 82436 ASSAY OF URINE CHLORIDE: CPT

## 2021-10-21 PROCEDURE — 84484 ASSAY OF TROPONIN QUANT: CPT

## 2021-10-21 PROCEDURE — 73030 X-RAY EXAM OF SHOULDER: CPT

## 2021-10-21 PROCEDURE — 70486 CT MAXILLOFACIAL W/O DYE: CPT

## 2021-10-21 PROCEDURE — 72170 X-RAY EXAM OF PELVIS: CPT

## 2021-10-21 PROCEDURE — 82550 ASSAY OF CK (CPK): CPT

## 2021-10-21 PROCEDURE — 2580000003 HC RX 258: Performed by: NURSE PRACTITIONER

## 2021-10-21 PROCEDURE — 85025 COMPLETE CBC W/AUTO DIFF WBC: CPT

## 2021-10-21 PROCEDURE — 97162 PT EVAL MOD COMPLEX 30 MIN: CPT

## 2021-10-21 PROCEDURE — 6360000002 HC RX W HCPCS: Performed by: NURSE PRACTITIONER

## 2021-10-21 PROCEDURE — 51701 INSERT BLADDER CATHETER: CPT

## 2021-10-21 PROCEDURE — 93005 ELECTROCARDIOGRAM TRACING: CPT | Performed by: EMERGENCY MEDICINE

## 2021-10-21 PROCEDURE — 84133 ASSAY OF URINE POTASSIUM: CPT

## 2021-10-21 PROCEDURE — 70450 CT HEAD/BRAIN W/O DYE: CPT

## 2021-10-21 PROCEDURE — 97166 OT EVAL MOD COMPLEX 45 MIN: CPT

## 2021-10-21 PROCEDURE — 72125 CT NECK SPINE W/O DYE: CPT

## 2021-10-21 PROCEDURE — 6360000002 HC RX W HCPCS: Performed by: HOSPITALIST

## 2021-10-21 RX ORDER — ONDANSETRON 4 MG/1
4 TABLET, ORALLY DISINTEGRATING ORAL EVERY 8 HOURS PRN
Status: DISCONTINUED | OUTPATIENT
Start: 2021-10-21 | End: 2021-10-28 | Stop reason: HOSPADM

## 2021-10-21 RX ORDER — LORAZEPAM 2 MG/ML
0.5 INJECTION INTRAMUSCULAR ONCE
Status: COMPLETED | OUTPATIENT
Start: 2021-10-21 | End: 2021-10-21

## 2021-10-21 RX ORDER — POLYETHYLENE GLYCOL 3350 17 G
2 POWDER IN PACKET (EA) ORAL
Status: DISCONTINUED | OUTPATIENT
Start: 2021-10-21 | End: 2021-10-28 | Stop reason: HOSPADM

## 2021-10-21 RX ORDER — HYDRALAZINE HYDROCHLORIDE 20 MG/ML
5 INJECTION INTRAMUSCULAR; INTRAVENOUS EVERY 4 HOURS PRN
Status: DISCONTINUED | OUTPATIENT
Start: 2021-10-21 | End: 2021-10-23

## 2021-10-21 RX ORDER — MAGNESIUM SULFATE IN WATER 40 MG/ML
2000 INJECTION, SOLUTION INTRAVENOUS PRN
Status: DISCONTINUED | OUTPATIENT
Start: 2021-10-21 | End: 2021-10-23

## 2021-10-21 RX ORDER — SODIUM CHLORIDE 9 MG/ML
INJECTION, SOLUTION INTRAVENOUS CONTINUOUS
Status: ACTIVE | OUTPATIENT
Start: 2021-10-21 | End: 2021-10-22

## 2021-10-21 RX ORDER — HEPARIN SODIUM 5000 [USP'U]/ML
5000 INJECTION, SOLUTION INTRAVENOUS; SUBCUTANEOUS EVERY 8 HOURS SCHEDULED
Status: CANCELLED | OUTPATIENT
Start: 2021-10-21

## 2021-10-21 RX ORDER — SODIUM CHLORIDE 0.9 % (FLUSH) 0.9 %
10 SYRINGE (ML) INJECTION PRN
Status: DISCONTINUED | OUTPATIENT
Start: 2021-10-21 | End: 2021-10-28 | Stop reason: HOSPADM

## 2021-10-21 RX ORDER — QUETIAPINE FUMARATE 50 MG/1
50 TABLET, FILM COATED ORAL NIGHTLY
Status: DISCONTINUED | OUTPATIENT
Start: 2021-10-21 | End: 2021-10-28 | Stop reason: HOSPADM

## 2021-10-21 RX ORDER — SODIUM CHLORIDE 9 MG/ML
25 INJECTION, SOLUTION INTRAVENOUS PRN
Status: DISCONTINUED | OUTPATIENT
Start: 2021-10-21 | End: 2021-10-23

## 2021-10-21 RX ORDER — ACETAMINOPHEN 325 MG/1
650 TABLET ORAL EVERY 6 HOURS PRN
Status: DISCONTINUED | OUTPATIENT
Start: 2021-10-21 | End: 2021-10-28 | Stop reason: HOSPADM

## 2021-10-21 RX ORDER — POTASSIUM CHLORIDE 20 MEQ/1
40 TABLET, EXTENDED RELEASE ORAL PRN
Status: DISCONTINUED | OUTPATIENT
Start: 2021-10-21 | End: 2021-10-28 | Stop reason: HOSPADM

## 2021-10-21 RX ORDER — POTASSIUM CHLORIDE 7.45 MG/ML
10 INJECTION INTRAVENOUS PRN
Status: DISCONTINUED | OUTPATIENT
Start: 2021-10-21 | End: 2021-10-23

## 2021-10-21 RX ORDER — ASPIRIN 81 MG/1
81 TABLET, CHEWABLE ORAL DAILY
Status: DISCONTINUED | OUTPATIENT
Start: 2021-10-21 | End: 2021-10-28 | Stop reason: HOSPADM

## 2021-10-21 RX ORDER — SODIUM CHLORIDE 0.9 % (FLUSH) 0.9 %
5-40 SYRINGE (ML) INJECTION EVERY 12 HOURS SCHEDULED
Status: DISCONTINUED | OUTPATIENT
Start: 2021-10-21 | End: 2021-10-23

## 2021-10-21 RX ORDER — ACETAMINOPHEN 650 MG/1
650 SUPPOSITORY RECTAL EVERY 6 HOURS PRN
Status: DISCONTINUED | OUTPATIENT
Start: 2021-10-21 | End: 2021-10-28 | Stop reason: HOSPADM

## 2021-10-21 RX ORDER — LATANOPROST 50 UG/ML
1 SOLUTION/ DROPS OPHTHALMIC NIGHTLY
Status: DISCONTINUED | OUTPATIENT
Start: 2021-10-21 | End: 2021-10-28 | Stop reason: HOSPADM

## 2021-10-21 RX ORDER — ONDANSETRON 2 MG/ML
4 INJECTION INTRAMUSCULAR; INTRAVENOUS EVERY 6 HOURS PRN
Status: DISCONTINUED | OUTPATIENT
Start: 2021-10-21 | End: 2021-10-23

## 2021-10-21 RX ORDER — TROSPIUM CHLORIDE 20 MG/1
20 TABLET, FILM COATED ORAL NIGHTLY
Status: DISCONTINUED | OUTPATIENT
Start: 2021-10-21 | End: 2021-10-23

## 2021-10-21 RX ORDER — POLYETHYLENE GLYCOL 3350 17 G/17G
17 POWDER, FOR SOLUTION ORAL DAILY PRN
Status: DISCONTINUED | OUTPATIENT
Start: 2021-10-21 | End: 2021-10-28 | Stop reason: HOSPADM

## 2021-10-21 RX ADMIN — CEFTRIAXONE SODIUM 1000 MG: 1 INJECTION, POWDER, FOR SOLUTION INTRAMUSCULAR; INTRAVENOUS at 12:45

## 2021-10-21 RX ADMIN — HYDRALAZINE HYDROCHLORIDE 5 MG: 20 INJECTION INTRAMUSCULAR; INTRAVENOUS at 13:18

## 2021-10-21 RX ADMIN — QUETIAPINE FUMARATE 50 MG: 50 TABLET ORAL at 20:20

## 2021-10-21 RX ADMIN — SODIUM CHLORIDE 25 ML: 9 INJECTION, SOLUTION INTRAVENOUS at 12:43

## 2021-10-21 RX ADMIN — CARBIDOPA AND LEVODOPA 1 TABLET: 25; 100 TABLET ORAL at 10:05

## 2021-10-21 RX ADMIN — CARBIDOPA AND LEVODOPA 1 TABLET: 25; 100 TABLET ORAL at 13:16

## 2021-10-21 RX ADMIN — SODIUM CHLORIDE: 9 INJECTION, SOLUTION INTRAVENOUS at 05:55

## 2021-10-21 RX ADMIN — LATANOPROST 1 DROP: 50 SOLUTION OPHTHALMIC at 20:19

## 2021-10-21 RX ADMIN — ASPIRIN 81 MG CHEWABLE TABLET 81 MG: 81 TABLET CHEWABLE at 10:05

## 2021-10-21 RX ADMIN — LORAZEPAM 0.5 MG: 2 INJECTION INTRAMUSCULAR; INTRAVENOUS at 20:39

## 2021-10-21 RX ADMIN — SODIUM CHLORIDE: 9 INJECTION, SOLUTION INTRAVENOUS at 16:20

## 2021-10-21 RX ADMIN — TROSPIUM CHLORIDE 20 MG: 20 TABLET, FILM COATED ORAL at 20:20

## 2021-10-21 RX ADMIN — CARBIDOPA AND LEVODOPA 1 TABLET: 25; 100 TABLET ORAL at 20:20

## 2021-10-21 ASSESSMENT — PAIN SCALES - GENERAL
PAINLEVEL_OUTOF10: 0

## 2021-10-21 ASSESSMENT — ENCOUNTER SYMPTOMS
ABDOMINAL PAIN: 0
VISUAL CHANGE: 0
NAUSEA: 0
RESPIRATORY NEGATIVE: 1
EYES NEGATIVE: 1
GASTROINTESTINAL NEGATIVE: 1
BOWEL INCONTINENCE: 0
VOMITING: 0

## 2021-10-21 NOTE — H&P
History and Physical      Name:  Sandhya Crenshaw /Age/Sex: 1935  (82 y.o. female)   MRN & CSN:  [id number] & [id number] Admission Date/Time: 10/20/2021 11:01 PM   Location:   PCP: Gilda Man MD       Hospital Day: 2        Admitting Physician: Dr. Tara Nuno and Plan:   Sandhya Crenshaw is a 82 y.o. female who presents with  [facility])    Ambulatory dysfunction resulting in fall with injury   Admit to [place] [address]   Fall precautions   PT/OT for placement   Neurochecks   Pending UA    Acute kidney injury on CKD stage IV- sCr 2.2 with baseline 1.5-1.6. Monitor lab trends with IVF   Pending urine indices    Holding nephrotoxic agents as able     Elevated troponin (0.068) in the setting of ERICKA   Telemetry monitoring/troponin trend   Denies chest pain/anginal symptoms    Facial abrasion/contusion   Skin tear left wrist  Multiple contusions left shoulder   CT head/facial bones/cervical spine (10/20/2021)   No acute intracranial abnormality. No acute fracture or dislocation in the facial and cranial bones. No acute fracture or subluxation in the cervical spine. Wound care    Parkinson's disease   Dementia with behavioral disturbances   continue Sinemet/Seroquel    Myelodysplastic syndrome  Chronic anemia H/H 9.4/30.6-appears stable  Chronic thrombocytopenia PLT 55   DVT prophylaxis held due to declining trend of platelets    Other chronic comorbidities  Major depressive disorder  Glaucoma  Hypertension     Patient case discussed with ED provider    Diet  adult diet   DVT Prophylaxis [] Lovenox, []  Heparin, [x] SCDs, [] Ambulation  [] Long term AC   GI Prophylaxis [] PPI,  [] H2 Blocker,  [] Carafate,  [x] Diet/Tube Feeds   Code Status Full     Disposition Admit to inpatient. Will likely need skilled nursing care upon discharge     History of Present Illness:     Chief Complaint: [facility])    Sandhya Crenshaw is a 82 y. o. ecchymoses. MS -No gross joint deformities. SKIN -Normal coloration, warm, dry. Multiple contusions to lateral/posterior aspect of left shoulder. Skin tear to right wrist with multiple contusions  NEURO-Cranial nerves appear grossly intact, normal speech, no lateralizing weakness. PSYC-Awake, alert, oriented x 1-she can tell me that she is in the hospital, flat affect. Past Medical History:      Past Medical History:   Diagnosis Date    Colonoscopy refused     Glaucoma     Dr. Vicente Alonso Hyperlipidemia     Hypertension     Major depressive disorder with single episode 9/13/2018    Patient has depression and agitation. Was started on the Lexapro.  Myelodysplastic syndrome ([facility] [address] 10/22/2018    Dx 10/2018 : Myelodysplastic syndrome with multilineage dysplasia Dr Shira Bhatt    Obesity     Status post total left knee replacement 4/27/2017    Pt had  Left TKA on 3/2017 in [place] Dr Cindia Cabot. Antibiotic prophylaxis recommended by ortho when needed.  Thrombocytopenia ([address] 9/23/2018    Patient is referred to hematologist       Past Surgical  History:    has a past surgical history that includes Cholecystectomy; Hysterectomy (Bilateral, 1981); Knee arthroscopy (Left, 2007); Cataract removal (Bilateral); and Total knee arthroplasty (Left, 03/10/2017).     Social History:    FAM HX: Reviewed and noncontributory    Soc HX:   Social History     Socioeconomic History    Marital status:      Spouse name: Not on file    Number of children: Not on file    Years of education: Not on file    Highest education level: Not on file   Occupational History    Not on file   Tobacco Use    Smoking status: Never Smoker    Smokeless tobacco: Never Used   Vaping Use    Vaping Use: Never used   Substance and Sexual Activity    Alcohol use: No     Alcohol/week: 0.0 standard drinks    Drug use: No    Sexual activity: Yes     Partners: Male   Other Topics Concern    Not on file   Social History Narrative  Not on file     Social Determinants of Health     Financial Resource Strain: Medium Risk    Difficulty of Paying Living Expenses: Somewhat hard   Food Insecurity: No Food Insecurity    Worried About Running Out of Food in the Last Year: Never true    Ran Out of Food in the Last Year: Never true   Transportation Needs: No Transportation Needs    Lack of Transportation (Medical): No    Lack of Transportation (Non-Medical): No   Physical Activity:     Days of Exercise per Week:     Minutes of Exercise per Session:    Stress:     Feeling of Stress :    Social Connections:     Frequency of Communication with Friends and Family:     Frequency of Social Gatherings with Friends and Family:     Attends Yazidism Services:     Active Member of Clubs or Organizations:     Attends Club or Organization Meetings:     Marital Status:    Intimate Partner Violence:     Fear of Current or Ex-Partner:     Emotionally Abused:     Physically Abused:     Sexually Abused:      TOBACCO:   reports that she has never smoked. She has never used smokeless tobacco.  ETOH:   reports no history of alcohol use. Drugs:  reports no history of drug use. Allergies: No Known Allergies    Home Medications:     Prior to Admission medications    Medication Sig Start Date End Date Taking?  Authorizing Provider   busPIRone (BUSPAR) 5 MG tablet Take 1 tablet by mouth 2 times daily 10/19/21   Jay Jay Naranjo MD   NUPLAZID 34 MG CAPS capsule Take 34 mg by mouth daily Will start 8-4-2021 7/29/21   Historical Provider, MD   QUEtiapine (SEROQUEL) 50 MG tablet Take 50 mg by mouth nightly    Historical Provider, MD   aspirin 81 MG chewable tablet Take 1 tablet by mouth daily 7/21/21   Claire Melara DO   LINZESS 290 MCG CAPS capsule TAKE 1 CAPSULE BY MOUTH EVERY DAY 4/1/21   Historical Provider, MD   carbidopa-levodopa (SINEMET)  MG per tablet TAKE 1 (ONE) TABLET BY MOUTH 3 (THREE) TIMES A DAY . 8/21/20   Historical Provider, MD tolterodine (DETROL LA) 4 MG extended release capsule Take 4 mg by mouth daily Dr. Patel Medico Provider, MD   latanoprost (XALATAN) 0.005 % ophthalmic solution 1 drop nightly. Historical Provider, MD         Medications:   Medications:    Infusions:   PRN Meds:     Data:     Laboratory this visit:  Reviewed  Recent Labs     10/20/21  2330   WBC 5.5   HGB 9.4*   HCT 30.6*   PLT 55*      Recent Labs     10/20/21  2330      K 4.1      CO2 31   BUN 30*   CREATININE 2.2*     Recent Labs     10/20/21  2330   INR 0.90         Radiology this visit:  Reviewed. XR PELVIS (1-2 VIEWS)    Result Date: 10/21/2021  EXAMINATION: ONE XRAY VIEW OF THE PELVIS 10/21/2021 2:28 am COMPARISON: Abdominal radiograph dated 07/19/2012 HISTORY: ORDERING SYSTEM PROVIDED HISTORY: trauma TECHNOLOGIST PROVIDED HISTORY: Reason for exam:->trauma Reason for Exam: fall Acuity: Acute Type of Exam: Initial Mechanism of Injury: fall Relevant Medical/Surgical History: fall FINDINGS: Evaluation of sacrum is limited due to overlying bowel gas. The sacroiliac joints appears symmetric. The pubic symphysis appears congruent. Mild osteoarthritis affects the bilateral hip joints. The bones appear demineralized without radiographic evidence of displaced hip fractures seen. Degenerative arthritic changes affect the visualized lower lumbar spine. There is redemonstration of surgical changes of the lower abdomen and pelvis. Right. The bones appear demineralized without convincing radiographic evidence of displaced hip fracture or fracture of the bones of the pelvis seen. RECOMMENDATION: If there is a clinical concern for occult fracture, particularly of the hip, MRI is recommended for further evaluation.      CT HEAD WO CONTRAST    Result Date: 10/21/2021  EXAMINATION: CT OF THE HEAD WITHOUT CONTRAST; CT OF THE FACE WITHOUT CONTRAST; CT OF THE CERVICAL SPINE WITHOUT CONTRAST  10/21/2021 1:20 am TECHNIQUE: CT of the head was performed without the administration of intravenous contrast. Dose modulation, iterative reconstruction, and/or weight based adjustment of the mA/kV was utilized to reduce the radiation dose to as low as reasonably achievable.; CT of the face was performed without the administration of intravenous contrast. Multiplanar reformatted images are provided for review. Dose modulation, iterative reconstruction, and/or weight based adjustment of the mA/kV was utilized to reduce the radiation dose to as low as reasonably achievable.; CT of the cervical spine was performed without the administration of intravenous contrast. Multiplanar reformatted images are provided for review. Dose modulation, iterative reconstruction, and/or weight based adjustment of the mA/kV was utilized to reduce the radiation dose to as low as reasonably achievable. COMPARISON: None. HISTORY: ORDERING SYSTEM PROVIDED HISTORY: Pain after fall TECHNOLOGIST PROVIDED HISTORY: Has a \"code stroke\" or \"stroke alert\" been called? ->No Reason for exam:->head pain fall hematoma frontal bone Reason for Exam: fall Acuity: Acute Type of Exam: Initial Mechanism of Injury: fall Relevant Medical/Surgical History: fall FINDINGS: Head and facial bones: BRAIN/VENTRICLES: There is no acute intracranial hemorrhage, mass effect or midline shift. No abnormal extra-axial fluid collection. The gray-white differentiation is maintained without evidence of an acute infarct. There is no evidence of hydrocephalus. ORBITS: The bilateral globes are intact. SINUSES: Small polyp versus retention cyst noted in the right posterior ethmoid air cells. Other paranasal sinuses and mastoid air cells are clear. SOFT TISSUES/BONES:  No acute abnormality of the visualized skull or soft tissues. Cervical spine: BONES/ALIGNMENT: There is no acute fracture or traumatic malalignment. DEGENERATIVE CHANGES: Multilevel cervical spondylosis is seen.  SOFT TISSUES: There is no prevertebral soft tissue swelling. Vascular calcification is noted. There is no apical pneumothorax. No acute intracranial abnormality. No acute fracture or dislocation in the facial and cranial bones. No acute fracture or subluxation in the cervical spine. CT FACIAL BONES WO CONTRAST    Result Date: 10/21/2021  EXAMINATION: CT OF THE HEAD WITHOUT CONTRAST; CT OF THE FACE WITHOUT CONTRAST; CT OF THE CERVICAL SPINE WITHOUT CONTRAST  10/21/2021 1:20 am TECHNIQUE: CT of the head was performed without the administration of intravenous contrast. Dose modulation, iterative reconstruction, and/or weight based adjustment of the mA/kV was utilized to reduce the radiation dose to as low as reasonably achievable.; CT of the face was performed without the administration of intravenous contrast. Multiplanar reformatted images are provided for review. Dose modulation, iterative reconstruction, and/or weight based adjustment of the mA/kV was utilized to reduce the radiation dose to as low as reasonably achievable.; CT of the cervical spine was performed without the administration of intravenous contrast. Multiplanar reformatted images are provided for review. Dose modulation, iterative reconstruction, and/or weight based adjustment of the mA/kV was utilized to reduce the radiation dose to as low as reasonably achievable. COMPARISON: None. HISTORY: ORDERING SYSTEM PROVIDED HISTORY: Pain after fall TECHNOLOGIST PROVIDED HISTORY: Has a \"code stroke\" or \"stroke alert\" been called? ->No Reason for exam:->head pain fall hematoma frontal bone Reason for Exam: fall Acuity: Acute Type of Exam: Initial Mechanism of Injury: fall Relevant Medical/Surgical History: fall FINDINGS: Head and facial bones: BRAIN/VENTRICLES: There is no acute intracranial hemorrhage, mass effect or midline shift. No abnormal extra-axial fluid collection. The gray-white differentiation is maintained without evidence of an acute infarct. There is no evidence of hydrocephalus. ORBITS: The bilateral globes are intact. SINUSES: Small polyp versus retention cyst noted in the right posterior ethmoid air cells. Other paranasal sinuses and mastoid air cells are clear. SOFT TISSUES/BONES:  No acute abnormality of the visualized skull or soft tissues. Cervical spine: BONES/ALIGNMENT: There is no acute fracture or traumatic malalignment. DEGENERATIVE CHANGES: Multilevel cervical spondylosis is seen. SOFT TISSUES: There is no prevertebral soft tissue swelling. Vascular calcification is noted. There is no apical pneumothorax. No acute intracranial abnormality. No acute fracture or dislocation in the facial and cranial bones. No acute fracture or subluxation in the cervical spine. CT CERVICAL SPINE WO CONTRAST    Result Date: 10/21/2021  EXAMINATION: CT OF THE HEAD WITHOUT CONTRAST; CT OF THE FACE WITHOUT CONTRAST; CT OF THE CERVICAL SPINE WITHOUT CONTRAST  10/21/2021 1:20 am TECHNIQUE: CT of the head was performed without the administration of intravenous contrast. Dose modulation, iterative reconstruction, and/or weight based adjustment of the mA/kV was utilized to reduce the radiation dose to as low as reasonably achievable.; CT of the face was performed without the administration of intravenous contrast. Multiplanar reformatted images are provided for review. Dose modulation, iterative reconstruction, and/or weight based adjustment of the mA/kV was utilized to reduce the radiation dose to as low as reasonably achievable.; CT of the cervical spine was performed without the administration of intravenous contrast. Multiplanar reformatted images are provided for review. Dose modulation, iterative reconstruction, and/or weight based adjustment of the mA/kV was utilized to reduce the radiation dose to as low as reasonably achievable. COMPARISON: None.  HISTORY: ORDERING SYSTEM PROVIDED HISTORY: Pain after fall TECHNOLOGIST PROVIDED HISTORY: Has a \"code stroke\" or \"stroke alert\" been acromioclavicular joint. Foci of mineralization seen along the greater tuberosity, which may reflect underlying calcific tendinosis without radiographic evidence of acute fracture or dislocation of the left shoulder seen. The bones appear demineralized. RECOMMENDATION: If there is a clinical concern for occult fracture, consider follow-up examination in 7-10 days. XR CHEST PORTABLE    Result Date: 10/21/2021  EXAMINATION: ONE XRAY VIEW OF THE CHEST 10/21/2021 2:28 am COMPARISON: 09/16/2021 HISTORY: ORDERING SYSTEM PROVIDED HISTORY: chest pain TECHNOLOGIST PROVIDED HISTORY: Reason for exam:->chest pain Reason for Exam: fall Acuity: Acute Type of Exam: Initial Mechanism of Injury: fall Relevant Medical/Surgical History: fall FINDINGS: The cardiac silhouette appears stable from the prior study. The aorta is uncoiled. No confluent airspace opacity, large pleural effusion or pneumothorax is seen. No radiographic evidence of acute pulmonary abnormality seen. Overall, no significant changes from the prior study are identified. EKG this visit:  Reviewed.       Electronically signed by COURTNEY Posey CNP on 10/21/2021 at 3:53 AM

## 2021-10-21 NOTE — PROGRESS NOTES
Patient arrived to inpatient unit via ER cart . Heart monitor applied. Patient skin assessment completed by this RN and Iva Bell. Patient's daughter is at bedside. She reports that her mother was admitted at [place] [address] on Tuesday and has had numerous fallls since admission. Patient's  needed Respite care. Skin assessment shows abrasion on face from fall at [facility], there is ecchymosis noted on bilateral upper extremities, abdomen, back, hips , bilateral legs. Furthermore, a large skin tear is noted on the left forearm that is cleansed , mepitel applied, and wrapped with Kerlix. There is numerous scabbing, abrasions and tears noted over body. Lin[facility] Order Set opened per Protocol. [facility] called by this RN as daughter thinks patient has a DNR form on file. This RN spoke with Yosef Diaz and she stated that she will let a nurse know when she sees one. Fax Number to [facility]  unit is provided by this RN.      Booker Page RN  6:44 AM

## 2021-10-21 NOTE — PLAN OF CARE
Problem: Falls - Risk of:  Goal: Will remain free from falls  Description: Will remain free from falls  Outcome: Ongoing  Goal: Absence of physical injury  Description: Absence of physical injury  Outcome: Ongoing     Problem: Skin Integrity:  Goal: Will show no infection signs and symptoms  Description: Will show no infection signs and symptoms  Outcome: Ongoing  Goal: Absence of new skin breakdown  Description: Absence of new skin breakdown  Outcome: Ongoing     Problem: Infection:  Goal: Will remain free from infection  Description: Will remain free from infection  Outcome: Ongoing     Problem: Safety:  Goal: Free from accidental physical injury  Description: Free from accidental physical injury  Outcome: Ongoing     Problem: Daily Care:  Goal: Daily care needs are met  Description: Daily care needs are met  Outcome: Ongoing     Problem: Pain:  Goal: Patient's pain/discomfort is manageable  Description: Patient's pain/discomfort is manageable  Outcome: Ongoing     Problem: Skin Integrity:  Goal: Skin integrity will stabilize  Description: Skin integrity will stabilize  Outcome: Ongoing     Problem: Discharge Planning:  Goal: Patients continuum of care needs are met  Description: Patients continuum of care needs are met  Outcome: Ongoing

## 2021-10-21 NOTE — ED NOTES
The client is delivered to [facility] ED, room 6, by EMS for the complaint of fall at [facility]. They have no IV access that was initiated by the EMS personnel. Vital signs & Transportation interventions have been reported as being performed by the EMS personnel. Upon arrival the client is placed onto an ED bed. They are alert, their breathing is unlabored, chest expansion symmetrical, skin is warm and dry to touch. Their extremities are relaxed and their facial expressions exhibit no facial grimacing. They do not verbalize appropriate to questions or comments, is reported to be at baseline mental status with advanced Dementia, and are able to maintain eye contact or mostly follow commands. Vital signs are obtained and the client is updated on the plan of care. The call light is placed within reach, they are blanketed, and the room lights are not dimmed.        [address] [facility] Billy, RN  10/20/21 [id number]
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
## 2021-10-21 NOTE — ED PROVIDER NOTES
 Difficulty of Paying Living Expenses: Somewhat hard   Food Insecurity: No Food Insecurity    Worried About Running Out of Food in the Last Year: Never true    Ran Out of Food in the Last Year: Never true   Transportation Needs: No Transportation Needs    Lack of Transportation (Medical): No    Lack of Transportation (Non-Medical): No   Physical Activity:     Days of Exercise per Week:     Minutes of Exercise per Session:    Stress:     Feeling of Stress :    Social Connections:     Frequency of Communication with Friends and Family:     Frequency of Social Gatherings with Friends and Family:     Attends Congregation Services:     Active Member of Clubs or Organizations:     Attends Club or Organization Meetings:     Marital Status:    Intimate Partner Violence:     Fear of Current or Ex-Partner:     Emotionally Abused:     Physically Abused:     Sexually Abused:      Past Surgical History:   Procedure Laterality Date    CATARACT REMOVAL Bilateral     CHOLECYSTECTOMY      HYSTERECTOMY Bilateral 1981    KNEE ARTHROSCOPY Left 2007    TOTAL KNEE ARTHROPLASTY Left 03/10/2017     Past Medical History:   Diagnosis Date    Colonoscopy refused     Glaucoma     Dr. Lesly Dorsey Hyperlipidemia     Hypertension     Major depressive disorder with single episode 9/13/2018    Patient has depression and agitation. Was started on the Lexapro.  Myelodysplastic syndrome ([facility] [address] 10/22/2018    Dx 10/2018 : Myelodysplastic syndrome with multilineage dysplasia Dr Ángel Marr    Obesity     Status post total left knee replacement 4/27/2017    Pt had  Left TKA on 3/2017 in [place] Dr Rebecca Yung. Antibiotic prophylaxis recommended by ortho when needed.  Thrombocytopenia ([address] 9/23/2018    Patient is referred to hematologist     No Known Allergies  Prior to Admission medications    Medication Sig Start Date End Date Taking?  Authorizing Provider   busPIRone (BUSPAR) 5 MG tablet Take 1 tablet by mouth 2 times daily 10/19/21   Abdoulaye Brenner MD   NUPLAZID 34 MG CAPS capsule Take 34 mg by mouth daily Will start 8-4-2021 7/29/21   Historical Provider, MD   QUEtiapine (SEROQUEL) 50 MG tablet Take 50 mg by mouth nightly    Historical Provider, MD   aspirin 81 MG chewable tablet Take 1 tablet by mouth daily 7/21/21   Yakov Lai DO   LINZESS 290 MCG CAPS capsule TAKE 1 CAPSULE BY MOUTH EVERY DAY 4/1/21   Historical Provider, MD   carbidopa-levodopa (SINEMET)  MG per tablet TAKE 1 (ONE) TABLET BY MOUTH 3 (THREE) TIMES A DAY . 8/21/20   Historical Provider, MD   tolterodine (DETROL LA) 4 MG extended release capsule Take 4 mg by mouth daily Dr. Kayy Tovar Provider, MD   latanoprost (XALATAN) 0.005 % ophthalmic solution 1 drop nightly. Historical Provider, MD       BP (!) 156/83   Pulse 70   Temp 97.3 °F (36.3 °C) (Temporal)   Resp 17   Ht 5' 8\" (1.727 m)   Wt 157 lb (71.2 kg)   SpO2 100%   BMI 23.87 kg/m²     Physical Exam  Vitals and nursing note reviewed. Constitutional:       Appearance: She is not ill-appearing or diaphoretic. HENT:      Head:      Jaw: There is normal jaw occlusion. Pain on movement present. Comments: Red is abrasion and blue denotes areas of soft tissue swelling teresa contusion     Right Ear: No hemotympanum. Left Ear: No hemotympanum. Nose: Signs of injury present. No nasal deformity or laceration. Right Nostril: No septal hematoma. Left Nostril: No septal hematoma. Cardiovascular:      Rate and Rhythm: Normal rate. Heart sounds: Murmur heard. Pulmonary:      Effort: No respiratory distress. Breath sounds: No wheezing. Chest:      Chest wall: No tenderness. Abdominal:      Tenderness: There is no abdominal tenderness. There is no guarding.    Musculoskeletal:      Comments: Skin tears noted on forearm and multiple bruising noted on hands, bruising noted over left greater tuberosity of humerus, paraspinal muscle tenderness over cervical vertebra and bilateral iliac crest tenderness. Neurological:      Mental Status: Mental status is at baseline. She is confused. Motor: Weakness and tremor present. Gait: Gait abnormal.      Comments: Weakness is symmetrical. Patient not ambulated due to unsteady gait per family         MDM:    Labs Reviewed   CBC WITH AUTO DIFFERENTIAL - Abnormal; Notable for the following components:       Result Value    RBC 3.09 (*)     Hemoglobin 9.4 (*)     Hematocrit 30.6 (*)     MCHC 30.7 (*)     RDW 15.4 (*)     Platelets 55 (*)     Monocytes % 10.5 (*)     All other components within normal limits   BASIC METABOLIC PANEL - Abnormal; Notable for the following components:    Anion Gap 3 (*)     BUN 30 (*)     CREATININE 2.2 (*)     GFR Non- 21 (*)     GFR  26 (*)     All other components within normal limits   PROTIME-INR - Abnormal; Notable for the following components:    Protime 11.2 (*)     All other components within normal limits   TROPONIN - Abnormal; Notable for the following components:    Troponin T 0.068 (*)     All other components within normal limits   URINALYSIS       CT CERVICAL SPINE WO CONTRAST   Final Result   No acute intracranial abnormality. No acute fracture or dislocation in the facial and cranial bones. No acute fracture or subluxation in the cervical spine. CT FACIAL BONES WO CONTRAST   Final Result   No acute intracranial abnormality. No acute fracture or dislocation in the facial and cranial bones. No acute fracture or subluxation in the cervical spine. CT HEAD WO CONTRAST   Final Result   No acute intracranial abnormality. No acute fracture or dislocation in the facial and cranial bones. No acute fracture or subluxation in the cervical spine. XR CHEST PORTABLE   Final Result   No radiographic evidence of acute pulmonary abnormality seen.   Overall, no   significant changes from the prior study are identified. XR PELVIS (1-2 VIEWS)   Final Result   The bones appear demineralized without convincing radiographic evidence of   displaced hip fracture or fracture of the bones of the pelvis seen. RECOMMENDATION:   If there is a clinical concern for occult fracture, particularly of the hip,   MRI is recommended for further evaluation. 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 Foci   of mineralization seen along the greater tuberosity, which may reflect   underlying calcific tendinosis without radiographic evidence of acute   fracture or dislocation of the left shoulder seen. The bones appear demineralized. RECOMMENDATION:   If there is a clinical concern for occult fracture, consider follow-up   examination in 7-10 days. Junctional rhythm   Nonspecific ST abnormality   Abnormal ECG   When compared with ECG of 16-SEP-2021 13:52,   Junctional rhythm has replaced Sinus rhythm       Patient will need to be admitted to the hospital.  Since moving over to the assisted living facility she has fallen multiple times and has multiple bruising and contusions over her body. The patient is an increased fall risk and she requires an increased level of care to a more skilled nursing facility. The patient had been admitted to a rehabilitation facility but the family states that this only helped a little bit and then after she was moved over to the assisted living she has only been there for 2 days and she has fallen multiple times. The patient in the emergency department has ambulatory dysfunction multiple contusions abrasions over her body as well as a scalp hematoma. Patient does not appear to have any orthopedic injuries her CT scans were all negative. Patient's chest x-ray pelvic x-ray as well as x-ray of her shoulder are also negative.   Patient's blood work shows minimally elevated troponin at 0.068 this will need to be trended and the patient also has mild elevation in her BUN and creatinine from her baseline. My typical dicussion, presentation, and considerations for this patients' chief complaint, diagnosis, differential diagnosis, medications, medication use,  medication safety and medication interactions have been explained and outlined to this patient for this patient encounter. I have stressed need for follow up and reexamination for this encounter and I have contacted hospitalist for admission       Final Impression    1. Closed head injury, initial encounter    2. Fall, initial encounter    3. Ambulatory dysfunction    4. Falls frequently    5. Multiple contusions    6.  Contusion of face, initial encounter              Leonidas Walsh DO  10/21/21 [id number]

## 2021-10-21 NOTE — ED NOTES
Transferred to Room 6, inpatient. Daughter has some of her belongings.        Andrez Gardner RN  10/21/21 2778

## 2021-10-21 NOTE — ED NOTES
Has many old bruises on her bilateral arms. Has abrasion in between her eyes. Daughter states she has fallen many times over the past 3 days and pt's  took care of her before going to assisted living. Pt is pleasantly confused. Pt c/o soreness of her back and neck.        Aishwarya De Guzman RN  10/21/21 0232
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
## 2021-10-21 NOTE — PROGRESS NOTES
Comprehensive Nutrition Assessment    Type and Reason for Visit:  Initial, Positive Nutrition Screen    Nutrition Recommendations/Plan: start oral supplement     Nutrition Assessment:  Pt admitted after a fall at the AL, she has hx of dementia and parkinsons. Plans are for SNF placement. Current with identified weight loss over the past 3 mons, and current poor intakes since admit. Will start clear supplement 3 times daily    Malnutrition Assessment:  Malnutrition Status: At risk for malnutrition (Comment)    Context:  Chronic Illness         Estimated Daily Nutrient Needs:  Energy (kcal):  3702-8966; Weight Used for Energy Requirements:  Current     Protein (g):  68-82; Weight Used for Protein Requirements:  Current (1-1.2)        Fluid (ml/day):  7798-0556; Method Used for Fluid Requirements:  1 ml/kcal      Nutrition Related Findings:  labs reviewed      Wounds:  Skin Tears       Current Nutrition Therapies:    ADULT DIET;  Regular  ADULT ORAL NUTRITION SUPPLEMENT; Breakfast, Lunch, Dinner; Clear Liquid Oral Supplement    Anthropometric Measures:  · Height: 5' 7\" (170.2 cm)  · Current Body Weight: 150 lb (68 kg)   · Admission Body Weight: 150 lb (68 kg)    · Usual Body Weight: 162 lb (73.5 kg) (7/16/21 encounter)     · Ideal Body Weight: 135 lbs; % Ideal Body Weight 111.1 %   · BMI: 23.5  · Adjusted Body Weight:  ; No Adjustment   · BMI Categories: Normal Weight (BMI 22.0 to 24.9) age over 72       Nutrition Diagnosis:   · Predicted inadequate energy intake, In context of acute illness or injury, In context of chronic illness related to acute injury/trauma, cognitive or neurological impairment as evidenced by intake 0-25%, weight loss 7.5% in 3 months      Nutrition Interventions:   Food and/or Nutrient Delivery:  Continue Current Diet, Start Oral Nutrition Supplement  Nutrition Education/Counseling:  Education not appropriate   Coordination of Nutrition Care:  Continue to monitor while inpatient    Goals:  Oral intakes will improve to at least 51-75% of most meals and supplements during her stay       Nutrition Monitoring and Evaluation:   Behavioral-Environmental Outcomes:  None Identified   Food/Nutrient Intake Outcomes:  Food and Nutrient Intake, Supplement Intake  Physical Signs/Symptoms Outcomes:  Biochemical Data, Chewing or Swallowing, Meal Time Behavior, Skin, Weight     Discharge Planning:     Too soon to determine     Electronically signed by Irais Mena RD, LD on 10/21/21 at 5:33 PM EDT    Contact: [phone]

## 2021-10-21 NOTE — PROGRESS NOTES
Occupational Therapy   Occupational Therapy Initial Assessment  Date: 10/21/2021   Patient Name: Bennett Buerger  MRN: [id number]     : 1935    Date of Service: 10/21/2021    Discharge Recommendations:  ECF with OT, Subacute/Skilled Nursing Facility  OT Equipment Recommendations  Equipment Needed: No (Will further assess)    Assessment   Performance deficits / Impairments: Decreased functional mobility ; Decreased ADL status; Decreased strength;Decreased endurance;Decreased cognition;Decreased balance  Assessment: Pt is a pleasant 83 yo female admitted s/p fall at Assisted Living. Pt presents with confusion, decreased functional mobility and ADL status. Recommend occupational therapy to address strengthening, improving functional mobility and balance safety and maximize participation in ADLs. Treatment Diagnosis: Decreased functional mobility  Prognosis: Good  Decision Making: Medium Complexity  History: see below  Exam: see below  OT Education: OT Role;Precautions;Transfer Training  Barriers to Learning: Pt presents with confusion  REQUIRES OT FOLLOW UP: Yes  Activity Tolerance  Activity Tolerance: Patient Tolerated treatment well  Activity Tolerance: Pt took extra time to complete tasks and presents with confusion but was pleasant and able to participate  Safety Devices  Safety Devices in place: Yes  Type of devices: All fall risk precautions in place;Gait belt;Patient at risk for falls; Left in bed;Call light within reach;Nurse notified; Bed alarm in place  Restraints  Initially in place: No           Patient Diagnosis(es): The primary encounter diagnosis was Closed head injury, initial encounter. Diagnoses of Fall, initial encounter, Ambulatory dysfunction, Falls frequently, Multiple contusions, and Contusion of face, initial encounter were also pertinent to this visit.      has a past medical history of Colonoscopy refused, Glaucoma, Hyperlipidemia, Hypertension, Major depressive disorder with single episode, Myelodysplastic syndrome ([facility] [address] Obesity, Status post total left knee replacement, and Thrombocytopenia ([facility] [address] has a past surgical history that includes Cholecystectomy; Hysterectomy (Bilateral, 1981); Knee arthroscopy (Left, 2007); Cataract removal (Bilateral); and Total knee arthroplasty (Left, 03/10/2017). Treatment Diagnosis: Decreased functional mobility      Restrictions  Restrictions/Precautions  Restrictions/Precautions: Fall Risk  Position Activity Restriction  Other position/activity restrictions: IV, Tele    Subjective   General  Patient assessed for rehabilitation services?: Yes  Family / Caregiver Present:  (Pts spouse arrived at end of session)  Subjective  Subjective: Pt reports \"I'm so tired and cold\". Pt reports she has been falling. Per spouse he reports Pt has been at [place] [address] but it has not been going well and she needs something else. Reports she needs more assistance then they are able to provide  General Comment  Comments: Pt presents asleep supine in bed  Patient Currently in Pain: Yes (Pt indicates she is sore in L/R hips)  Pain Assessment  Pain Assessment:  (Pt unable to provide pain level)  Pain Level: 0  Patient's Stated Pain Goal: No pain  Vital Signs  Patient Currently in Pain: Yes (Pt indicates she is sore in L/R hips)  Oxygen Therapy  SpO2: 97 %  Pulse Oximeter Device Mode: Intermittent  Pulse Oximeter Device Location: Right;Finger  Social/Functional History  Social/Functional History  Lives With: Other (comment)  Type of Home: Assisted living (pt has been in assisted living since Monday, but has has 3 falls in 2 days.)  Home Layout: One level  Home Access: Level entry  ADL Assistance: Needs assistance  Homemaking Assistance: Needs assistance  Homemaking Responsibilities: No  Ambulation Assistance: Needs assistance (Pt uses a [id number])  Transfer Assistance: Needs assistance (pt reports she has needed A getting up and has not been able to get up at AL by herself.  She reports they are often rough and pull on her arms and it hurts. She reports her falls are occuring when she tries to stand.)  Active : No  Additional Comments: Pt presents with confusion and is a poor historian. Pt lived at home with  who has been caring for her for last 2 yeas. She recently entered [facility] assisted living on Monday 10/18/21.  reports she has had 3 falls in 2 days and they contacted him and told him she requires more care than they are able to provide. Objective        Orientation  Overall Orientation Status: Impaired (Pt reports she is in a a hospital and was able to provide  but after a delay to think about it)  Orientation Level: Disoriented to time;Oriented to person;Disoriented to situation;Oriented to place  Observation/Palpation  Observation: Pt presented in bed sleeping. Balance  Sitting Balance: Minimal assistance (CGA-Min A initially sitting up at EOB then improved to SBA)  Standing Balance: Contact guard assistance  Standing Balance  Activity: Pt stood up from EOB with walker, CGA and able to take steps to L side 2x then standing in place able to lift R/L feet off of floor. Pt took 1 step forward and a step back. Demo no LOB  ADL  Feeding: Modified independent ;Setup  Grooming: Minimal assistance (Pt washed face with washcloth and combed front of hair and min A to brush back of hair)  UE Bathing: Minimal assistance  LE Bathing: Maximum assistance  UE Dressing: Minimal assistance;Verbal cueing  LE Dressing: Maximum assistance  Toileting: Maximum assistance  Additional Comments: Based on observation of Pt current functional mobility, strength, endurance and cognitive status  Tone RUE  RUE Tone: Normotonic  Tone LUE  LUE Tone: Normotonic  Coordination  Movements Are Fluid And Coordinated: Yes     Bed mobility  Supine to Sit: 2 Person assistance; Moderate assistance  Sit to Supine: 2 Person assistance; Moderate assistance  Scooting: Dependent/Total;2 Person assistance  Transfers  Sit to stand: Minimal assistance (CGA-Min A from EOB)  Stand to sit: Contact guard assistance     Cognition  Overall Cognitive Status: Exceptions  Arousal/Alertness: Delayed responses to stimuli  Following Commands: Follows one step commands consistently  Attention Span: Attends with cues to redirect  Memory: Decreased recall of biographical Information  Problem Solving: Assistance required to generate solutions;Assistance required to implement solutions  Initiation: Requires cues for some  Sequencing: Requires cues for some        Sensation  Overall Sensation Status: Impaired (Pt reported having some numbness in her leg but unable to specify location)        LUE AROM (degrees)  LUE AROM : Exceptions  LUE General AROM: Pt demo limited shoulder ROM ~0-60  RUE AROM (degrees)  RUE AROM : Exceptions  RUE General AROM: Pt demo limited shoulder ROM ~0-60  LUE Strength  Gross LUE Strength: Exceptions to [facility] (3+/5)  RUE Strength  Gross RUE Strength: Exceptions to [facility] (3+/5)                   Plan   Plan  Times per week: 4x  Current Treatment Recommendations: Strengthening, Balance Training, Functional Mobility Training, Endurance Training, Safety Education & Training, Patient/Caregiver Education & Training, Equipment Evaluation, Education, & procurement, Self-Care / ADL, Cognitive Reorientation    G-Code     OutComes Score                                                  AM-PAC Score    AM-PAC 6 click short form for inpatient daily activity:   How much help from another person does the patient currently need. .. Unable  Dep A Lot  Max A A Lot   Mod A A Little  Min A A Little   CGA  SBA None   Mod I  Indep  Sup   1. Putting on and taking off regular lower body clothing? [] 1    [x] 2   [] 2   [] 3   [] 3   [] 4      2. Bathing (including washing, rinsing, drying)? [] 1   [x] 2   [] 2 [] 3 [] 3 [] 4   3.  Toileting, which includes using toilet, bedpan, or urinal? [] 1    [x] 2   [] 2   [] 3   [] 3   [] 4     4. Putting on and taking off regular upper body clothing? [] 1   [] 2   [] 2   [x] 3   [] 3    [] 4      5. Taking care of personal grooming such as brushing teeth? [] 1   [] 2    [] 2 [x] 3    [] 3   [] 4      6. Eating meals? [] 1   [] 2   [] 2   [] 3   [] 3   [x] 4      Raw Score:  16    [24=0% impaired(CH), 23=1-19%(CI), 20-22=20-39%(CJ), 15-19=40-59%(CK), 10-14=60-79%(CL), 7-9=80-99%(CM), 6=100%(CN)]              Goals  Short term goals  Time Frame for Short term goals: Until DC or goals met  Short term goal 1: Pt will complete trfs with CGA  Short term goal 2: Pt will complete UE bathing/dressing with SBA and verbal cues to initiate  Short term goal 3: Pt will complete LE bathing/dressing with min A and verbal cues to intiate  Short term goal 4: Pt will complete toileting SBA  Short term goal 5: Pt will complete BUE therex 10+ min to increase strength/endurance for functional mobility, ADLs       Therapy Time   Individual Concurrent Group Co-treatment   Time In       1019   Time Out       1110   Minutes       51   Timed Code Treatment Minutes: 30 Minutes       Radha Ellington, OTR/L [id number]

## 2021-10-22 LAB
ALBUMIN SERPL-MCNC: 2.7 GM/DL (ref 3.4–5)
ALP BLD-CCNC: 64 IU/L (ref 40–129)
ALT SERPL-CCNC: <5 U/L (ref 10–40)
ANION GAP SERPL CALCULATED.3IONS-SCNC: 14 MMOL/L (ref 4–16)
AST SERPL-CCNC: 8 IU/L (ref 15–37)
BASOPHILS ABSOLUTE: 0 K/CU MM
BASOPHILS RELATIVE PERCENT: 0.5 % (ref 0–1)
BILIRUB SERPL-MCNC: 0.5 MG/DL (ref 0–1)
BUN BLDV-MCNC: 22 MG/DL (ref 6–23)
CALCIUM SERPL-MCNC: 8.4 MG/DL (ref 8.3–10.6)
CHLORIDE BLD-SCNC: 110 MMOL/L (ref 99–110)
CO2: 17 MMOL/L (ref 21–32)
CREAT SERPL-MCNC: 1.4 MG/DL (ref 0.6–1.1)
DIFFERENTIAL TYPE: ABNORMAL
EOSINOPHILS ABSOLUTE: 0.1 K/CU MM
EOSINOPHILS RELATIVE PERCENT: 2.8 % (ref 0–3)
GFR AFRICAN AMERICAN: 43 ML/MIN/1.73M2
GFR NON-AFRICAN AMERICAN: 36 ML/MIN/1.73M2
GLUCOSE BLD-MCNC: 73 MG/DL (ref 70–99)
HCT VFR BLD CALC: 28.1 % (ref 37–47)
HEMOGLOBIN: 8.7 GM/DL (ref 12.5–16)
IMMATURE NEUTROPHIL %: 0.2 % (ref 0–0.43)
LYMPHOCYTES ABSOLUTE: 1.1 K/CU MM
LYMPHOCYTES RELATIVE PERCENT: 26 % (ref 24–44)
MCH RBC QN AUTO: 31.4 PG (ref 27–31)
MCHC RBC AUTO-ENTMCNC: 31 % (ref 32–36)
MCV RBC AUTO: 101.4 FL (ref 78–100)
MONOCYTES ABSOLUTE: 0.5 K/CU MM
MONOCYTES RELATIVE PERCENT: 11.2 % (ref 0–4)
PDW BLD-RTO: 15.5 % (ref 11.7–14.9)
PLATELET # BLD: 83 K/CU MM (ref 140–440)
PMV BLD AUTO: 11.8 FL (ref 7.5–11.1)
POTASSIUM SERPL-SCNC: 3.7 MMOL/L (ref 3.5–5.1)
RBC # BLD: 2.77 M/CU MM (ref 4.2–5.4)
SEGMENTED NEUTROPHILS ABSOLUTE COUNT: 2.5 K/CU MM
SEGMENTED NEUTROPHILS RELATIVE PERCENT: 59.3 % (ref 36–66)
SODIUM BLD-SCNC: 141 MMOL/L (ref 135–145)
TOTAL IMMATURE NEUTOROPHIL: 0.01 K/CU MM
TOTAL PROTEIN: 4.8 GM/DL (ref 6.4–8.2)
WBC # BLD: 4.3 K/CU MM (ref 4–10.5)

## 2021-10-22 PROCEDURE — 6360000002 HC RX W HCPCS: Performed by: NURSE PRACTITIONER

## 2021-10-22 PROCEDURE — 6370000000 HC RX 637 (ALT 250 FOR IP): Performed by: NURSE PRACTITIONER

## 2021-10-22 PROCEDURE — 80053 COMPREHEN METABOLIC PANEL: CPT

## 2021-10-22 PROCEDURE — 2580000003 HC RX 258: Performed by: NURSE PRACTITIONER

## 2021-10-22 PROCEDURE — 2580000003 HC RX 258: Performed by: HOSPITALIST

## 2021-10-22 PROCEDURE — 1200000000 HC SEMI PRIVATE

## 2021-10-22 PROCEDURE — 6360000002 HC RX W HCPCS: Performed by: HOSPITALIST

## 2021-10-22 PROCEDURE — 36415 COLL VENOUS BLD VENIPUNCTURE: CPT

## 2021-10-22 PROCEDURE — 85025 COMPLETE CBC W/AUTO DIFF WBC: CPT

## 2021-10-22 PROCEDURE — 51798 US URINE CAPACITY MEASURE: CPT

## 2021-10-22 PROCEDURE — 94761 N-INVAS EAR/PLS OXIMETRY MLT: CPT

## 2021-10-22 PROCEDURE — 6370000000 HC RX 637 (ALT 250 FOR IP): Performed by: HOSPITALIST

## 2021-10-22 RX ORDER — RIVASTIGMINE 4.6 MG/24H
1 PATCH, EXTENDED RELEASE TRANSDERMAL DAILY
Status: DISCONTINUED | OUTPATIENT
Start: 2021-10-22 | End: 2021-10-28 | Stop reason: HOSPADM

## 2021-10-22 RX ORDER — LORAZEPAM 2 MG/ML
0.25 INJECTION INTRAMUSCULAR EVERY 8 HOURS PRN
Status: DISCONTINUED | OUTPATIENT
Start: 2021-10-22 | End: 2021-10-22

## 2021-10-22 RX ORDER — LORAZEPAM 2 MG/ML
0.25 INJECTION INTRAMUSCULAR EVERY 8 HOURS PRN
Status: DISCONTINUED | OUTPATIENT
Start: 2021-10-22 | End: 2021-10-23

## 2021-10-22 RX ORDER — QUETIAPINE FUMARATE 25 MG/1
25 TABLET, FILM COATED ORAL DAILY
Status: DISCONTINUED | OUTPATIENT
Start: 2021-10-22 | End: 2021-10-26

## 2021-10-22 RX ADMIN — TROSPIUM CHLORIDE 20 MG: 20 TABLET, FILM COATED ORAL at 20:45

## 2021-10-22 RX ADMIN — LORAZEPAM 0.25 MG: 2 INJECTION INTRAMUSCULAR; INTRAVENOUS at 22:15

## 2021-10-22 RX ADMIN — SODIUM CHLORIDE, PRESERVATIVE FREE 10 ML: 5 INJECTION INTRAVENOUS at 10:04

## 2021-10-22 RX ADMIN — SODIUM CHLORIDE: 9 INJECTION, SOLUTION INTRAVENOUS at 03:23

## 2021-10-22 RX ADMIN — CEFTRIAXONE SODIUM 1000 MG: 1 INJECTION, POWDER, FOR SOLUTION INTRAMUSCULAR; INTRAVENOUS at 13:14

## 2021-10-22 RX ADMIN — HYDRALAZINE HYDROCHLORIDE 5 MG: 20 INJECTION INTRAMUSCULAR; INTRAVENOUS at 18:25

## 2021-10-22 RX ADMIN — CARBIDOPA AND LEVODOPA 1 TABLET: 25; 100 TABLET ORAL at 20:45

## 2021-10-22 RX ADMIN — SODIUM CHLORIDE, PRESERVATIVE FREE 10 ML: 5 INJECTION INTRAVENOUS at 20:48

## 2021-10-22 RX ADMIN — LATANOPROST 1 DROP: 50 SOLUTION OPHTHALMIC at 20:48

## 2021-10-22 RX ADMIN — SODIUM CHLORIDE 25 ML: 9 INJECTION, SOLUTION INTRAVENOUS at 13:12

## 2021-10-22 RX ADMIN — HYDRALAZINE HYDROCHLORIDE 5 MG: 20 INJECTION INTRAMUSCULAR; INTRAVENOUS at 10:03

## 2021-10-22 RX ADMIN — QUETIAPINE FUMARATE 50 MG: 50 TABLET ORAL at 20:45

## 2021-10-22 ASSESSMENT — PAIN SCALES - PAIN ASSESSMENT IN ADVANCED DEMENTIA (PAINAD)
BODYLANGUAGE: 0
BODYLANGUAGE: 0
CONSOLABILITY: 0
BREATHING: 0
TOTALSCORE: 0
NEGVOCALIZATION: 0
NEGVOCALIZATION: 0
BREATHING: 0
NEGVOCALIZATION: 0
BODYLANGUAGE: 1
TOTALSCORE: 0
BODYLANGUAGE: 0
FACIALEXPRESSION: 0
CONSOLABILITY: 2
CONSOLABILITY: 0
NEGVOCALIZATION: 0
BODYLANGUAGE: 0
TOTALSCORE: 3
FACIALEXPRESSION: 0
CONSOLABILITY: 0
FACIALEXPRESSION: 0
BREATHING: 0
FACIALEXPRESSION: 0
BREATHING: 0
BREATHING: 0
NEGVOCALIZATION: 0
TOTALSCORE: 0
FACIALEXPRESSION: 0
CONSOLABILITY: 0
TOTALSCORE: 0

## 2021-10-22 ASSESSMENT — PAIN SCALES - GENERAL
PAINLEVEL_OUTOF10: 0

## 2021-10-22 NOTE — PROGRESS NOTES
Progress Note      Subjective:   Chief complaint: dementia, s/p fall    Interval History: Tyree Garcia a 82 y. o. female who presents with concerns of frequent falls from her assisted living facility.  Patient has a history of dementia and unable to provide any HPI formation. Her daughter states at least 3 falls in the past 2 days prior to admission.  History of dementia and primary caregiver was the patient's spouse but recently moved her to assisted living on 10/18/2021. During night, patient had episode of sundowning, given 0.5mg ativan and at this time is sleeping. Unable to obtain ROS. She has jordan cath in place for urine retention    Past medical history, surgical history, family history and social history reviewed and unchanged compared to H&P earlier this admission. Medications:   Scheduled Meds:   QUEtiapine  25 mg Oral Daily    rivastigmine  1 patch TransDERmal Daily    sodium chloride flush  5-40 mL IntraVENous 2 times per day    QUEtiapine  50 mg Oral Nightly    aspirin  81 mg Oral Daily    trospium  20 mg Oral Nightly    carbidopa-levodopa  1 tablet Oral TID    latanoprost  1 drop Both Eyes Nightly    cefTRIAXone (ROCEPHIN) IV  1,000 mg IntraVENous Q24H     Continuous Infusions:   sodium chloride 25 mL (10/22/21 1312)       Objective:     Vital Signs  Temp: 97.9 °F (36.6 °C)  Pulse: 89  Resp: 20  BP: (!) 162/77  SpO2: 92 %  O2 Device: None (Room air)       Vital signs reviewed in electronic charts.     Physical exam  General: sleeping, awakens but falls back to sleep;,  HRRR  LCTA  Abdomen: soft NT ND NABS\  Ext:  No ankle edema    Results:     Lab Results   Component Value Date    WBC 4.3 10/22/2021    HGB 8.7 (L) 10/22/2021    HCT 28.1 (L) 10/22/2021    .4 (H) 10/22/2021    PLT 83 (L) 10/22/2021       Lab Results   Component Value Date     10/22/2021    K 3.7 10/22/2021     10/22/2021    CO2 17 10/22/2021    BUN 22 10/22/2021    CREATININE 1.4 10/22/2021    GLUCOSE 73 10/22/2021    CALCIUM 8.4 10/22/2021        Assessment and Plan: Active Hospital Problems    Diagnosis Date Noted    Ambulatory dysfunction [R26.2] 10/21/2021        Ambulatory dysfunction resulting in fall with injury              Admit to [address] precautions              PT/OT for placement     UTI -  Continue rocephin await urine culture    Urine retention - probably due to parkinson - jordan cath anchored     Acute kidney injury on CKD stage IV              - Cr 2.2 with baseline 1.5-1.6.                Monitor lab trends with IVF              Holding nephrotoxic agents as able                Elevated troponin  - suspect demand ischemia associated with recent fall, anemia .             Telemetry monitoring/troponin trend are remaining stable              Denies chest pain/anginal symptoms     Facial abrasion/contusion   Skin tear left wrist  Multiple contusions left shoulder              CT head/facial bones/cervical spine (10/20/2021)              No acute intracranial abnormality.             No acute fracture or dislocation in the facial and cranial bones.               No acute fracture or subluxation in the cervical spine.               [facility] care     Parkinson's disease    - patient is off nuplaza completely    Dementia with behavioral disturbances              continue Sinemet;   On seroquel 50mg at HS and will add 25mg at afternoon to try and prevent evening sundowning episode;  Prn ativan if sundowning severe     Myelodysplastic syndrome  Chronic anemia H/H 9.4/30.6-appears stable  Chronic thrombocytopenia PLT 55              DVT prophylaxis held due to declining trend of platelets     Other chronic comorbidities  Major depressive disorder  Glaucoma  Hypertension      Disposition: [facility] to [facility] on Monday 10/25/21    Electronically signed by Josey Heck DO on 10/22/2021 at 7:14 PM

## 2021-10-22 NOTE — PROGRESS NOTES
Occupational Therapy  Pt not seen by OT/PT on this date due to pt being medicated drowiness and being resistive to most basic activities per discussion with nursing.     Sabrina VALDEZ/SHANTAL [id number]

## 2021-10-22 NOTE — PLAN OF CARE
Problem: Falls - Risk of:  Goal: Will remain free from falls  Description: Will remain free from falls  Outcome: Ongoing  Goal: Absence of physical injury  Description: Absence of physical injury  Outcome: Ongoing     Problem: Skin Integrity:  Goal: Will show no infection signs and symptoms  Description: Will show no infection signs and symptoms  Outcome: Ongoing  Goal: Absence of new skin breakdown  Description: Absence of new skin breakdown  Outcome: Ongoing     Problem: Infection:  Goal: Will remain free from infection  Description: Will remain free from infection  Outcome: Ongoing     Problem: Safety:  Goal: Free from accidental physical injury  Description: Free from accidental physical injury  Outcome: Ongoing     Problem: Daily Care:  Goal: Daily care needs are met  Description: Daily care needs are met  Outcome: Ongoing     Problem: Pain:  Goal: Patient's pain/discomfort is manageable  Description: Patient's pain/discomfort is manageable  Outcome: Ongoing  Goal: Pain level will decrease  Description: Pain level will decrease  Outcome: Ongoing  Goal: Control of acute pain  Description: Control of acute pain  Outcome: Ongoing  Goal: Control of chronic pain  Description: Control of chronic pain  Outcome: Ongoing     Problem: Skin Integrity:  Goal: Skin integrity will stabilize  Description: Skin integrity will stabilize  Outcome: Ongoing     Problem: Discharge Planning:  Goal: Patients continuum of care needs are met  Description: Patients continuum of care needs are met  Outcome: Ongoing

## 2021-10-22 NOTE — DISCHARGE INSTR - COC
Continuity of Care Form    Patient Name: Lopez Code   :  1935  MRN:  [id number]    Admit date:  10/20/2021  Discharge date:  10/28/21    Code Status Order: Full Code   Advance Directives:     Admitting Physician:  Wing Courtney MD  PCP: [place] Cosme MD    Discharging Nurse: [facility] Unit/Room#: 940/550-75  Discharging Unit Phone Number: [phone]    Emergency Contact:   Extended Emergency Contact Information  Primary Emergency Contact: Arturo Santos  Address: [facility], [address] [place] [address] [address] [place] [address] [address]  Phone: [phone]  Work Phone: [phone]  Relation: Spouse  Secondary Emergency Contact: Danielle chiara  Home Phone: [phone]  Mobile Phone: [phone]  Relation: Child  Preferred language: English   needed?  No    Past Surgical History:  Past Surgical History:   Procedure Laterality Date    CATARACT REMOVAL Bilateral     CHOLECYSTECTOMY      HYSTERECTOMY Bilateral     KNEE ARTHROSCOPY Left     TOTAL KNEE ARTHROPLASTY Left 03/10/2017       Immunization History:   Immunization History   Administered Date(s) Administered    Influenza Virus Vaccine 10/29/2014, 2015, 2019    Influenza, High Dose (Fluzone 65 yrs and older) 10/13/2016, 2017, 10/22/2018    Influenza, Quadv, adjuvanted, 65 yrs +, IM, PF (Fluad) 2020    Influenza, Triv, inactivated, subunit, adjuvanted, IM (Fluad 65 yrs and older) 10/17/2019    Pneumococcal Conjugate 13-valent ([id number]) 2015    Pneumococcal Polysaccharide ([id number]) 2004       Active Problems:  Patient Active Problem List   Diagnosis Code    Essential hypertension I10    Mixed hyperlipidemia E78.2    Glaucoma of both eyes H40.9    Major depressive disorder with single episode F32.9    Weight loss R63.4    Thrombocytopenia (HCC) D69.6    Bilateral low back pain with left-sided sciatica M54.42    Myelodysplastic syndrome (HCC) D46.9    Renal insufficiency N28.9    Hallucination R44.3    Parkinsonism ([facility] [address] G20    Other megaloblastic anemias, 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    Other fatigue R53.83    Dementia with behavioral disturbance ([facility]) F03.91    Other constipation K59.09    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[facility] [address] G20, F02.80    Urinary incontinence R32    Ambulatory dysfunction R26.2       Isolation/Infection:   Isolation          No Isolation        Patient Infection Status     None to display          Nurse Assessment:  Last Vital Signs: BP (!) 177/96   Pulse 56   Temp 97.9 °F (36.6 °C) (Infrared)   Resp 10   Ht 5' 7\" (1.702 m)   Wt 150 lb (68 kg)   SpO2 92%   BMI 23.49 kg/m²     Last documented pain score (0-10 scale): Pain Level: 0 (Asleep)  Last Weight:   Wt Readings from Last 1 Encounters:   10/21/21 150 lb (68 kg)     Mental Status:  alert    IV Access:  - None    Nursing Mobility/ADLs:  Walking   Dependent  Transfer  Dependent  Bathing  Dependent  Dressing  Dependent  Toileting  Dependent  Feeding  Assisted  Med Admin  Assisted  Med Delivery   whole    Wound Care Documentation and Therapy:        Elimination:  Continence:   · Bowel: No  · Bladder: No  Urinary Catheter: None   Colostomy/Ileostomy/Ileal Conduit: No       Date of Last BM: 10/27/21    Intake/Output Summary (Last 24 hours) at 10/22/2021 1411  Last data filed at 10/22/2021 0938  Gross per 24 hour   Intake 120 ml   Output 300 ml   Net -180 ml     I/O last 3 completed shifts: In: 1046.9 [P.O.:240;  I.V.:756.9; IV Piggyback:50]  Out: 300 [Urine:300]    Safety Concerns:     History of Falls (last 30 days)    Impairments/Disabilities:      None      Patient's personal belongings (please select all that are sent with patient):  Glasses, Dentures upper and lower    RN SIGNATURE:  {Esignature:[id number]}    CASE MANAGEMENT/SOCIAL WORK SECTION    Inpatient Status Date: 10/21/2021    Readmission Risk Assessment Score:  Readmission Risk              Risk of Unplanned Readmission:  16           Discharging to Facility/ Agency   · Name: [facility]   · Address: [place] [address] [address], [address]  · Phone: [phone]  · Fax: [phone]    Dialysis Facility (if applicable)   · Name:  · Address:  · Dialysis Schedule:  · Phone:  · Fax:    / signature: Electronically signed by Chin Romo RN on 10/22/21 at 2:12 PM EDT    PHYSICIAN SECTION  Nutrition Therapy:  Current Nutrition Therapy:   - Oral Diet:  General    Routes of Feeding: Oral  Liquids: No Restrictions  Daily Fluid Restriction: no  Last Modified Barium Swallow with Video (Video Swallowing Test): not done    Treatments at the Time of Hospital Discharge:   Respiratory Treatments: ***  Oxygen Therapy:  {Therapy; copd oxygen:09112}  Ventilator:    {MH CC Vent EIVW:603339445}    Rehab Therapies: {THERAPEUTIC INTERVENTION:2782719896}  Weight Bearing Status/Restrictions: [address] CC Weight Bearin}  Other Medical Equipment (for information only, NOT a DME order):  {EQUIPMENT:850080540}  Other Treatments: ***    Prognosis: {Prognosis:1862782754}    Condition at Discharge: [address] Patient Condition:073122926}    Rehab Potential (if transferring to Rehab): {Prognosis:1422239396}    Recommended Labs or Other Treatments After Discharge: ***    Physician Certification: I certify the above information and transfer of Claudia Fernandez  is necessary for the continuing treatment of the diagnosis listed and that she requires {Admit to Appropriate Level of Care:08324} for {GREATER/LESS:922875829} 30 days.      Update Admission H&P: {CHP DME Changes in DKLEM:713711210}    PHYSICIAN SIGNATURE:  {Esignature:[id number]}

## 2021-10-23 LAB
BASOPHILS ABSOLUTE: 0 K/CU MM
BASOPHILS RELATIVE PERCENT: 0.5 % (ref 0–1)
DIFFERENTIAL TYPE: ABNORMAL
EOSINOPHILS ABSOLUTE: 0.1 K/CU MM
EOSINOPHILS RELATIVE PERCENT: 1.6 % (ref 0–3)
HCT VFR BLD CALC: 29.4 % (ref 37–47)
HEMOGLOBIN: 9.2 GM/DL (ref 12.5–16)
IMMATURE NEUTROPHIL %: 0.2 % (ref 0–0.43)
LYMPHOCYTES ABSOLUTE: 1.4 K/CU MM
LYMPHOCYTES RELATIVE PERCENT: 24.4 % (ref 24–44)
MCH RBC QN AUTO: 31.1 PG (ref 27–31)
MCHC RBC AUTO-ENTMCNC: 31.3 % (ref 32–36)
MCV RBC AUTO: 99.3 FL (ref 78–100)
MONOCYTES ABSOLUTE: 0.6 K/CU MM
MONOCYTES RELATIVE PERCENT: 9.8 % (ref 0–4)
PDW BLD-RTO: 15.6 % (ref 11.7–14.9)
PLATELET # BLD: 46 K/CU MM (ref 140–440)
PMV BLD AUTO: ABNORMAL FL (ref 7.5–11.1)
RBC # BLD: 2.96 M/CU MM (ref 4.2–5.4)
SEGMENTED NEUTROPHILS ABSOLUTE COUNT: 3.6 K/CU MM
SEGMENTED NEUTROPHILS RELATIVE PERCENT: 63.5 % (ref 36–66)
TOTAL IMMATURE NEUTOROPHIL: 0.01 K/CU MM
WBC # BLD: 5.7 K/CU MM (ref 4–10.5)

## 2021-10-23 PROCEDURE — 85025 COMPLETE CBC W/AUTO DIFF WBC: CPT

## 2021-10-23 PROCEDURE — 2580000003 HC RX 258: Performed by: NURSE PRACTITIONER

## 2021-10-23 PROCEDURE — 1200000000 HC SEMI PRIVATE

## 2021-10-23 PROCEDURE — 94761 N-INVAS EAR/PLS OXIMETRY MLT: CPT

## 2021-10-23 PROCEDURE — 6370000000 HC RX 637 (ALT 250 FOR IP): Performed by: HOSPITALIST

## 2021-10-23 PROCEDURE — 6360000002 HC RX W HCPCS: Performed by: NURSE PRACTITIONER

## 2021-10-23 PROCEDURE — 36415 COLL VENOUS BLD VENIPUNCTURE: CPT

## 2021-10-23 PROCEDURE — 6370000000 HC RX 637 (ALT 250 FOR IP): Performed by: NURSE PRACTITIONER

## 2021-10-23 RX ORDER — TAMSULOSIN HYDROCHLORIDE 0.4 MG/1
0.4 CAPSULE ORAL EVERY EVENING
Status: DISCONTINUED | OUTPATIENT
Start: 2021-10-23 | End: 2021-10-28 | Stop reason: HOSPADM

## 2021-10-23 RX ORDER — LORAZEPAM 0.5 MG/1
0.25 TABLET ORAL NIGHTLY PRN
Status: DISCONTINUED | OUTPATIENT
Start: 2021-10-23 | End: 2021-10-28 | Stop reason: HOSPADM

## 2021-10-23 RX ORDER — LORAZEPAM 2 MG/ML
0.25 INJECTION INTRAMUSCULAR NIGHTLY PRN
Status: DISCONTINUED | OUTPATIENT
Start: 2021-10-23 | End: 2021-10-23

## 2021-10-23 RX ORDER — CEPHALEXIN 500 MG/1
500 CAPSULE ORAL EVERY 6 HOURS SCHEDULED
Status: DISCONTINUED | OUTPATIENT
Start: 2021-10-23 | End: 2021-10-23 | Stop reason: DRUGHIGH

## 2021-10-23 RX ORDER — DIPHENHYDRAMINE HYDROCHLORIDE 50 MG/ML
25 INJECTION INTRAMUSCULAR; INTRAVENOUS
Status: COMPLETED | OUTPATIENT
Start: 2021-10-23 | End: 2021-10-23

## 2021-10-23 RX ORDER — CEPHALEXIN 250 MG/1
250 CAPSULE ORAL EVERY 8 HOURS SCHEDULED
Status: DISCONTINUED | OUTPATIENT
Start: 2021-10-23 | End: 2021-10-28 | Stop reason: HOSPADM

## 2021-10-23 RX ADMIN — TAMSULOSIN HYDROCHLORIDE 0.4 MG: 0.4 CAPSULE ORAL at 14:36

## 2021-10-23 RX ADMIN — SODIUM CHLORIDE, PRESERVATIVE FREE 10 ML: 5 INJECTION INTRAVENOUS at 20:11

## 2021-10-23 RX ADMIN — QUETIAPINE FUMARATE 25 MG: 25 TABLET ORAL at 14:36

## 2021-10-23 RX ADMIN — CARBIDOPA AND LEVODOPA 1 TABLET: 25; 100 TABLET ORAL at 14:36

## 2021-10-23 RX ADMIN — SODIUM CHLORIDE, PRESERVATIVE FREE 10 ML: 5 INJECTION INTRAVENOUS at 08:40

## 2021-10-23 RX ADMIN — LORAZEPAM 0.25 MG: 2 INJECTION INTRAMUSCULAR; INTRAVENOUS at 07:23

## 2021-10-23 RX ADMIN — CEPHALEXIN 250 MG: 250 CAPSULE ORAL at 14:36

## 2021-10-23 RX ADMIN — DIPHENHYDRAMINE HYDROCHLORIDE 25 MG: 50 INJECTION, SOLUTION INTRAMUSCULAR; INTRAVENOUS at 20:11

## 2021-10-23 RX ADMIN — CARBIDOPA AND LEVODOPA 1 TABLET: 25; 100 TABLET ORAL at 08:39

## 2021-10-23 RX ADMIN — ASPIRIN 81 MG CHEWABLE TABLET 81 MG: 81 TABLET CHEWABLE at 08:39

## 2021-10-23 ASSESSMENT — PAIN SCALES - PAIN ASSESSMENT IN ADVANCED DEMENTIA (PAINAD)
BODYLANGUAGE: 0
NEGVOCALIZATION: 0
BREATHING: 0
TOTALSCORE: 0
NEGVOCALIZATION: 0
CONSOLABILITY: 0
BODYLANGUAGE: 0
FACIALEXPRESSION: 0
TOTALSCORE: 0
NEGVOCALIZATION: 0
TOTALSCORE: 0
FACIALEXPRESSION: 0
TOTALSCORE: 0
CONSOLABILITY: 0
FACIALEXPRESSION: 0
BODYLANGUAGE: 0
BODYLANGUAGE: 0
FACIALEXPRESSION: 0
BREATHING: 0
FACIALEXPRESSION: 0
BREATHING: 0
CONSOLABILITY: 0
FACIALEXPRESSION: 0
BREATHING: 0
CONSOLABILITY: 0
CONSOLABILITY: 0
FACIALEXPRESSION: 0
TOTALSCORE: 0
FACIALEXPRESSION: 0
BODYLANGUAGE: 0
TOTALSCORE: 0
BREATHING: 0
TOTALSCORE: 0
BREATHING: 0
CONSOLABILITY: 0
NEGVOCALIZATION: 0
TOTALSCORE: 0
NEGVOCALIZATION: 0
BODYLANGUAGE: 0
TOTALSCORE: 0
CONSOLABILITY: 0
BREATHING: 0
NEGVOCALIZATION: 0
BREATHING: 0
CONSOLABILITY: 0
BODYLANGUAGE: 0
BODYLANGUAGE: 0
NEGVOCALIZATION: 0
NEGVOCALIZATION: 0
CONSOLABILITY: 0
TOTALSCORE: 0
NEGVOCALIZATION: 0
BODYLANGUAGE: 0
BODYLANGUAGE: 0
CONSOLABILITY: 0
FACIALEXPRESSION: 0
CONSOLABILITY: 0
TOTALSCORE: 0
FACIALEXPRESSION: 0
CONSOLABILITY: 0
TOTALSCORE: 0
BODYLANGUAGE: 0
BODYLANGUAGE: 0
NEGVOCALIZATION: 0
FACIALEXPRESSION: 0
TOTALSCORE: 0
BREATHING: 0
FACIALEXPRESSION: 0
CONSOLABILITY: 0
BREATHING: 0
FACIALEXPRESSION: 0
BREATHING: 0
FACIALEXPRESSION: 0
NEGVOCALIZATION: 0
NEGVOCALIZATION: 0
CONSOLABILITY: 0
TOTALSCORE: 0
BREATHING: 0

## 2021-10-23 ASSESSMENT — PAIN SCALES - GENERAL: PAINLEVEL_OUTOF10: 0

## 2021-10-23 NOTE — PROGRESS NOTES
Progress Note      Subjective:   Chief complaint: dementia, s/p fall     Interval History: Jean Santos is a 82 y. o. female who presents with  frequent falls from her assisted living facility. recently moved her to assisted living on 10/18/2021.  Rosie Montilla has a history of dementia and unable to provide any HPI formation. Her daughter states at least 3 falls in the past 2 days prior to admission. Patient had episode last night of agitation, lost IV access, and was given 0.25mg IM ativan with improvement. She is awake and alert this AM eating breakfast with assistance. She states no complaints, no N, no V, no abdomen pain. She is oriented to self but not place/events.      Past medical history, surgical history, family history and social history reviewed and unchanged compared to H&P earlier this admission. Medications:   Scheduled Meds:   cephALEXin  500 mg Oral 4 times per day    QUEtiapine  25 mg Oral Daily    rivastigmine  1 patch TransDERmal Daily    QUEtiapine  50 mg Oral Nightly    aspirin  81 mg Oral Daily    carbidopa-levodopa  1 tablet Oral TID    latanoprost  1 drop Both Eyes Nightly   Detrol medications D/C this AM    Continuous Infusions:    Objective:     Vital Signs  Temp: 97.7 °F (36.5 °C)  Pulse: 68  Resp: 17  BP: (!) 165/87  SpO2: 96 %  O2 Device: None (Room air)       Vital signs reviewed in electronic charts. Physical exam  General: pleasantly confused, NAD  HRRR  LCTA      Results:     Lab Results   Component Value Date    WBC 5.7 10/23/2021    HGB 9.2 (L) 10/23/2021    HCT 29.4 (L) 10/23/2021    MCV 99.3 10/23/2021    PLT 46 (L) 10/23/2021       Lab Results   Component Value Date     10/22/2021    K 3.7 10/22/2021     10/22/2021    CO2 17 10/22/2021    BUN 22 10/22/2021    CREATININE 1.4 10/22/2021    GLUCOSE 73 10/22/2021    CALCIUM 8.4 10/22/2021        Assessment and Plan:      Active Hospital Problems    Diagnosis Date Noted    Ambulatory dysfunction [R26.2] 10/21/2021     Ambulatory dysfunction resulting in fall with injury              Admit to [address] precautions              PT/OT for placement     UTI -  no IV access, change rocephin to keflex. Urine culture not sent - no results available.     Urine retention - probably due medications - (sinemet and detrol). jordan cath anchored. D/C detrol , start flomax and d/c jordan tomorrow with bladder retraining.     Acute kidney injury on CKD stage IV - RESOLVED with IVF              - Cr 2.2 with baseline 1.5-1.6.                  Elevated troponin  - suspect demand ischemia associated with recent fall, anemia . - stable     Facial abrasion/contusion   Skin tear left wrist  Multiple contusions left shoulder              CT head/facial bones/cervical spine (10/20/2021)              No acute intracranial abnormality.             No acute fracture or dislocation in the facial and cranial bones.               No acute fracture or subluxation in the cervical spine.               [facility]     Parkinson's disease    - patient is off nuplaza completely     Dementia with behavioral disturbances              continue Sinemet;   On seroquel 50mg at HS and will add 25mg at afternoon to try and prevent evening sundowning episode;  Prn ativan if sundowning severe     Myelodysplastic syndrome  Chronic anemia H/H 9.4/30.6-appears stable  Chronic thrombocytopenia PLT 55              DVT prophylaxis held due to declining trend of platelets     Other chronic comorbidities  Major depressive disorder  Glaucoma  Hypertension        Disposition: discharge to [facility] on Monday 10/25/21    Electronically signed by Jayant Jacobsen DO on 10/23/2021 at 12:56 PM

## 2021-10-24 PROCEDURE — 1200000000 HC SEMI PRIVATE

## 2021-10-24 PROCEDURE — 6370000000 HC RX 637 (ALT 250 FOR IP): Performed by: NURSE PRACTITIONER

## 2021-10-24 PROCEDURE — 2580000003 HC RX 258: Performed by: NURSE PRACTITIONER

## 2021-10-24 PROCEDURE — 6370000000 HC RX 637 (ALT 250 FOR IP): Performed by: HOSPITALIST

## 2021-10-24 RX ADMIN — ASPIRIN 81 MG CHEWABLE TABLET 81 MG: 81 TABLET CHEWABLE at 09:04

## 2021-10-24 RX ADMIN — CARBIDOPA AND LEVODOPA 1 TABLET: 25; 100 TABLET ORAL at 09:04

## 2021-10-24 RX ADMIN — TAMSULOSIN HYDROCHLORIDE 0.4 MG: 0.4 CAPSULE ORAL at 17:47

## 2021-10-24 RX ADMIN — QUETIAPINE FUMARATE 50 MG: 50 TABLET ORAL at 19:32

## 2021-10-24 RX ADMIN — LORAZEPAM 0.25 MG: 0.5 TABLET ORAL at 20:17

## 2021-10-24 RX ADMIN — CEPHALEXIN 250 MG: 250 CAPSULE ORAL at 14:43

## 2021-10-24 RX ADMIN — CARBIDOPA AND LEVODOPA 1 TABLET: 25; 100 TABLET ORAL at 19:34

## 2021-10-24 RX ADMIN — CEPHALEXIN 250 MG: 250 CAPSULE ORAL at 19:34

## 2021-10-24 RX ADMIN — CARBIDOPA AND LEVODOPA 1 TABLET: 25; 100 TABLET ORAL at 14:43

## 2021-10-24 RX ADMIN — CEPHALEXIN 250 MG: 250 CAPSULE ORAL at 06:23

## 2021-10-24 RX ADMIN — SODIUM CHLORIDE, PRESERVATIVE FREE 10 ML: 5 INJECTION INTRAVENOUS at 19:32

## 2021-10-24 RX ADMIN — QUETIAPINE FUMARATE 25 MG: 25 TABLET ORAL at 14:43

## 2021-10-24 RX ADMIN — LATANOPROST 1 DROP: 50 SOLUTION OPHTHALMIC at 19:32

## 2021-10-24 ASSESSMENT — PAIN SCALES - PAIN ASSESSMENT IN ADVANCED DEMENTIA (PAINAD)
TOTALSCORE: 0
FACIALEXPRESSION: 0
TOTALSCORE: 0
CONSOLABILITY: 0
BODYLANGUAGE: 0
NEGVOCALIZATION: 0
CONSOLABILITY: 0
BODYLANGUAGE: 0
BODYLANGUAGE: 0
CONSOLABILITY: 0
FACIALEXPRESSION: 0
TOTALSCORE: 0
BREATHING: 0
NEGVOCALIZATION: 0
TOTALSCORE: 0
FACIALEXPRESSION: 0
BODYLANGUAGE: 0
NEGVOCALIZATION: 0
NEGVOCALIZATION: 0
CONSOLABILITY: 0
FACIALEXPRESSION: 0
NEGVOCALIZATION: 0
CONSOLABILITY: 0
BODYLANGUAGE: 0
FACIALEXPRESSION: 0
BREATHING: 0
NEGVOCALIZATION: 0
BREATHING: 0
CONSOLABILITY: 0
BODYLANGUAGE: 0
BREATHING: 0
BREATHING: 0
FACIALEXPRESSION: 0
BREATHING: 0

## 2021-10-24 ASSESSMENT — PAIN SCALES - GENERAL
PAINLEVEL_OUTOF10: 0

## 2021-10-24 NOTE — PROGRESS NOTES
Pt at this time is resting comfortably. Pt tends to migrate over to the right side of the bed against the rail. Pt has been repositioned all night with pillows put along side the railings. Pt continually takes the pillows and puts them elsewhere in the bed. Pt is not talking out and yelling now either, she is just talking continually.

## 2021-10-24 NOTE — PROGRESS NOTES
Pt yelling out and trying to get OOB. Attempts of talking with pt to get her to put her legs back into bed are unsuccessful. Pt agreed to take medication. Pt's medications plus her prn ativan were crushed and put into applesauce. Pt took one bit however start hitting at staff with the second bite. After several attempts pt opened her mouth to take the applesauce with meds and then promptly spit it all over this nurse. No more attempts were made at this time to give pt her night time medications as pt is agitated and does not follow commands at all. Pt was ordered a prn dose of benadryl IV which was given. Will continue to monitor pt.

## 2021-10-24 NOTE — PLAN OF CARE
Problem: Falls - Risk of:  Goal: Will remain free from falls  Description: Will remain free from falls  10/24/2021 0913 by Aubree Desai RN  Outcome: Ongoing  10/23/2021 2157 by Marquez Arora RN  Outcome: Ongoing  Goal: Absence of physical injury  Description: Absence of physical injury  10/24/2021 0913 by Aubree Desai RN  Outcome: Ongoing  10/23/2021 2157 by Marquez Arora RN  Outcome: Ongoing     Problem: Skin Integrity:  Goal: Will show no infection signs and symptoms  Description: Will show no infection signs and symptoms  10/24/2021 0913 by Aubree Desai RN  Outcome: Ongoing  10/23/2021 2157 by Marquez Arora RN  Outcome: Ongoing  Goal: Absence of new skin breakdown  Description: Absence of new skin breakdown  10/24/2021 0913 by Aubree Desai RN  Outcome: Ongoing  10/23/2021 2157 by Marquez Arora RN  Outcome: Ongoing     Problem: Infection:  Goal: Will remain free from infection  Description: Will remain free from infection  10/24/2021 0913 by Aubree Desai RN  Outcome: Ongoing  10/23/2021 2157 by Marquez Arora RN  Outcome: Ongoing     Problem: Safety:  Goal: Free from accidental physical injury  Description: Free from accidental physical injury  10/24/2021 0913 by Aubree Desai RN  Outcome: Ongoing  10/23/2021 2157 by Marquez Arora RN  Outcome: Ongoing     Problem: Daily Care:  Goal: Daily care needs are met  Description: Daily care needs are met  10/24/2021 0913 by Aubree Desai RN  Outcome: Ongoing  10/23/2021 2157 by Marquez Arora RN  Outcome: Ongoing

## 2021-10-24 NOTE — PROGRESS NOTES
Pt in bed still moving about but not attempting to get OOB at this time. She is talking aloud but doesn't appear to be talking to anyone just talking out loud. Pt denies pain and denies needing anything. Pt does not like staff pt appears to be annoyed with any care.

## 2021-10-24 NOTE — PROGRESS NOTES
Progress Note      Subjective:   Chief complaint:dementia, s/p fall     Interval History: Sera Santos is a 82 y. o. female who presents with  frequent falls from her assisted living facility. recently moved her to assisted living on 10/18/2021.  Marixa Ritchie has a history of dementia and unable to provide any HPI formation.  Her daughter states at least 3 falls in the past 2 days prior to admission. This morning patient is eating breakfast with assistance. She states feels tired and \"cant keep my eye open to use the typewrite\". She states no complaints, no N, no V, no abdomen pain. She is oriented to self but not place/events  . Past medical history, surgical history, family history and social history reviewed and unchanged compared to H&P earlier this admission. Medications:   Scheduled Meds:   cephALEXin  250 mg Oral 3 times per day    tamsulosin  0.4 mg Oral QPM    QUEtiapine  25 mg Oral Daily    rivastigmine  1 patch TransDERmal Daily    QUEtiapine  50 mg Oral Nightly    aspirin  81 mg Oral Daily    carbidopa-levodopa  1 tablet Oral TID    latanoprost  1 drop Both Eyes Nightly     Continuous Infusions:    Objective:     Vital Signs  Temp: 97.8 °F (36.6 °C)  Pulse: 73  Resp: 20  BP: (!) 155/72  SpO2: 95 %  O2 Device: None (Room air)       Vital signs reviewed in electronic charts. Physical exam  General: alert, pleasantly confused, NAD.   Follows simple commands  HRRR  LCTA  Ext: no edema  Skin: abrasions to face, hands/finger and feet with right 5th toe old bruising    Results:     Lab Results   Component Value Date    WBC 5.7 10/23/2021    HGB 9.2 (L) 10/23/2021    HCT 29.4 (L) 10/23/2021    MCV 99.3 10/23/2021    PLT 46 (L) 10/23/2021       Lab Results   Component Value Date     10/22/2021    K 3.7 10/22/2021     10/22/2021    CO2 17 10/22/2021    BUN 22 10/22/2021    CREATININE 1.4 10/22/2021    GLUCOSE 73 10/22/2021    CALCIUM 8.4 10/22/2021        Assessment and Plan:     Active Hospital Problems    Diagnosis Date Noted    Ambulatory dysfunction [R26.2] 10/21/2021     Ambulatory dysfunction resulting in fall with injury              Admit to [address] precautions              PT/OT for placement     UTI -   continue with keflex. Urine culture not sent - no results available.      Urine retention - probably due medications - (sinemet and detrol). D/C jordan,  D/C detrol ,continue flomax until discharge     Acute kidney injury on CKD stage IV - RESOLVED with IVF              - Cr 2.2 with baseline 1.5-1.6.                  Elevated troponin  - suspect demand ischemia associated with recent fall, anemia . - stable     Facial abrasion/contusion   Skin tear left wrist  Multiple contusions left shoulder              CT head/facial bones/cervical spine (10/20/2021)              No acute intracranial abnormality.                No acute fracture or dislocation in the facial and cranial bones.               No acute fracture or subluxation in the cervical spine.               [facility] care     Parkinson's disease    - patient is off nuplaza completely     Dementia with behavioral disturbances              continue Sinemet;  On seroquel 50mg at HS and will add 25mg at afternoon to try and prevent evening sundowning episode;  Prn ativan if sundowning severe     Myelodysplastic syndrome  Chronic anemia H/H 9.4/30.6-appears stable  Chronic thrombocytopenia PLT 55              DVT prophylaxis held due to declining trend of platelets     Other chronic comorbidities  Major depressive disorder  Glaucoma  Hypertension        Disposition: discharge to [facility] on Monday 10/25/21    Electronically signed by Shelton Iniguez DO on 10/24/2021 at 9:33 AM

## 2021-10-24 NOTE — PROGRESS NOTES
Pt has done much better the past few hours. Pt has been talking but not yelling off and on in her room and not trying to get OOB. Pt was very agreeable this morning and did take her antibiotic pill without difficulty.

## 2021-10-25 PROCEDURE — 1200000000 HC SEMI PRIVATE

## 2021-10-25 PROCEDURE — 2580000003 HC RX 258: Performed by: NURSE PRACTITIONER

## 2021-10-25 PROCEDURE — 51798 US URINE CAPACITY MEASURE: CPT

## 2021-10-25 PROCEDURE — 6370000000 HC RX 637 (ALT 250 FOR IP): Performed by: NURSE PRACTITIONER

## 2021-10-25 PROCEDURE — 6370000000 HC RX 637 (ALT 250 FOR IP): Performed by: HOSPITALIST

## 2021-10-25 RX ADMIN — CARBIDOPA AND LEVODOPA 1 TABLET: 25; 100 TABLET ORAL at 14:39

## 2021-10-25 RX ADMIN — CARBIDOPA AND LEVODOPA 1 TABLET: 25; 100 TABLET ORAL at 20:02

## 2021-10-25 RX ADMIN — CEPHALEXIN 250 MG: 250 CAPSULE ORAL at 14:40

## 2021-10-25 RX ADMIN — LORAZEPAM 0.25 MG: 0.5 TABLET ORAL at 20:02

## 2021-10-25 RX ADMIN — QUETIAPINE FUMARATE 25 MG: 25 TABLET ORAL at 14:39

## 2021-10-25 RX ADMIN — CARBIDOPA AND LEVODOPA 1 TABLET: 25; 100 TABLET ORAL at 09:21

## 2021-10-25 RX ADMIN — CEPHALEXIN 250 MG: 250 CAPSULE ORAL at 09:21

## 2021-10-25 RX ADMIN — QUETIAPINE FUMARATE 50 MG: 50 TABLET ORAL at 20:02

## 2021-10-25 RX ADMIN — ASPIRIN 81 MG CHEWABLE TABLET 81 MG: 81 TABLET CHEWABLE at 09:21

## 2021-10-25 RX ADMIN — CEPHALEXIN 250 MG: 250 CAPSULE ORAL at 20:02

## 2021-10-25 RX ADMIN — SODIUM CHLORIDE, PRESERVATIVE FREE 10 ML: 5 INJECTION INTRAVENOUS at 20:03

## 2021-10-25 ASSESSMENT — PAIN SCALES - GENERAL
PAINLEVEL_OUTOF10: 0

## 2021-10-25 NOTE — FLOWSHEET NOTE
Attempted to see patient this AM for Physical Therapy but was unable as the patient became agitated and refused to work with therapy.   Cruzito Hayes PTA

## 2021-10-25 NOTE — PROGRESS NOTES
Patient Name: Gary Khan  Patient : 1935  Patient MRN: [id number]     Primary Oncologist: Jose Luis Scruggs MD  Referring Provider: Ariadna Devlin MD       Date of Service: 2021      Chief Complaint:   Chief Complaint   Patient presents with    Follow-up     She came in for follow-up visit. Problem List:      Essential hypertension     Mixed hyperlipidemia     Obesity     Glaucoma of both eyes     Major depressive disorder with single episode     Weight loss     Thrombocytopenia (HCC)     Bilateral low back pain with left-sided sciatica     Myelodysplastic syndrome (HCC)     Renal insufficiency     Hallucination     Parkinsonism (HCC)     Cognitive impairment     Other megaloblastic anemias, not elsewhere classified     Other fatigue     Disorder of the skin and subcutaneous tissue, unspecified      HPI:   Gary Khan is a pleasant 61-year-old  female patient was referred for evaluation of macrocytic anemia and thrombocytopenia. In 2011 WBC was 6.6, hemoglobin 13.4, MCV 98.8, platelet 474. 2011 WBC was 6.7 hemoglobin 13.4, MCV 99. platelet 420. 2018 WBC was 7.6, hemoglobin 11.9, hematocrit 37.8, .7, platelets 62. In 2018 WBC was 7.8, hemoglobin 12.3, 104.9, platelet 98. B-12 was  387.5, folate was 14.4. CMP in 2018 was unremarkable except for creatinine 1.1. TSH in 2018 was 2.05. Venous Doppler study of the left lower extremity in 2017 was negative for DVT. We discussed the potential etiologies of macrocytic anemia and thrombocytopenia. .  She denies any alcohol. I order ultrasound of the liver and spleen. Mammogram was in 2017. She refuses colonoscopy. Bone marrow study on 2018 revealed cellular marrow with 3 lineage hematopoiesis with dysplasia in all 3 lines. Increased marrow iron stores, ring sideroblasts were not identified.   This finding appears classified as myelodysplastic syndrome with with multilineage dysplasia. FISH for MDS was negative. Cytogenetic study revealed normal female karyotype. Flow cytometry was negative for blast.  She likely has low risk myelodysplasia. We discussed about potential risk of acute leukemia. She is agreeable to continue with active surveillance. LDH was within normal limits. Acute viral hepatitis serology was negative. Ultrasound of the abdomen revealed incidental simple right kidney cyst.    In May 2019 CBC was stable. Labs in November 2019 were reviewed. Labs in March 2020 were reviewed and stable. Platelet has been fluctuating. She was seen by neurologist and diagnosed with Parkinson disease. She is placed on tolterodine and quetiapine. She is on baby aspirin. She gets bruises easily like related to aspirin and thin skin. In July 2020 anemic work-up was grossly unremarkable. She has anemia of chronic disease. She has easy bruising likely secondary to ASA and other medications. BMP in December 2020 was stable for her. She had the Covid vaccine. On November 22, 2021 she came in for follow-up visit. July 2021 ferritin was 2063, folate 6.4, B12 498.9. In October 2021 hemoglobin was 9.2, WBC 5.7, platelet 46. MCV was 99.3. BMP was stable. 11/22/2021 WBC was 6.3, Hg 9.5 and platelet 267. She is on ASA and has few bruises. She fell in October 2021. CT head, facial bones and C-spine in October 2021 showed no fracture and intracranial abnormality. We discussed about fall precaution. .  No acute pain. Denied any nausea, vomiting or diarrhea. No fever or chills. No chest pain, shortness of breath or palpitation. No headache or dizzy spell. No specific bone pain. No melena or hematochezia. Denied any dysuria or hematuria. Past Medical History  Osteoarthritis, depression/anxiety, glaucoma, dyslipidemia, hypertension, obesity, anemia and thrombocytopenia.     Surgical History     Cataract surgery in 2009, cholecystectomy in early 90, right breast biopsy in 1989, arthroscopy Knee surgery in 2008 and left knee replacement in March 2017. Complete hysterectomy in 1981. Social History  Smoking Status Never smoker  She denied any alcohol or illicit drug use. She has 5 children. Family History     Mother had tumor of stomach. Review of Systems: \"Per interval history; otherwise 10 point ROS is negative. \"     Vital Signs:  BP (!) 89/57 (Site: Right Upper Arm, Position: Sitting, Cuff Size: Medium Adult)   Pulse 68   Temp 97.4 °F (36.3 °C) (Temporal)   Ht 5' 7\" (1.702 m)   Wt 144 lb 9.6 oz (65.6 kg)   SpO2 98%   BMI 22.65 kg/m²     Physical Exam:    CONSTITUTIONAL: awake, alert, cooperative, no apparent distress +walker  EYES: sclera clear and pale conjunctiva  ENT: Normocephalic, without obvious abnormality, atraumatic [place]  NECK: supple, symmetrical.  Dry oral mucosa. No JVD. HEMATOLOGIC/LYMPHATIC: no cervical, supraclavicular  lymphadenopathy   LUNGS: no increased work of breathing and clear to auscultation   CARDIOVASCULAR: regular rate and rhythm, normal S1 and S2, no murmur   ABDOMEN: normal bowel sound, soft, non-distended, non-tender  MUSCULOSKELETAL: full range of motion noted, tone is normal  NEUROLOGIC: awake, alert. Cranial nerves II through XII grossly intact. SKIN: No jaundice. Few bruises to upper extremities. She has thin skin. Dry skin. EXTREMITIES: no LE edema. She has arthritic changes to the distal joint of the fingers.      Labs:    Hematology:  Lab Results   Component Value Date    WBC 6.3 11/22/2021    RBC 3.02 (L) 11/22/2021    HGB 9.5 (L) 11/22/2021    HCT 30.5 (L) 11/22/2021    .0 (H) 11/22/2021    MCH 31.5 (H) 11/22/2021    MCHC 31.1 (L) 11/22/2021    RDW 18.0 (H) 11/22/2021     11/22/2021    MPV 12.0 (H) 11/22/2021    SEGSPCT 60.6 11/22/2021    EOSRELPCT 1.7 11/22/2021    BASOPCT 0.2 11/22/2021    LYMPHOPCT 28.6 11/22/2021    MONOPCT 8.9 (H) 11/22/2021 SEGSABS 3.8 11/22/2021    EOSABS 0.1 11/22/2021    BASOSABS 0.0 11/22/2021    LYMPHSABS 1.8 11/22/2021    MONOSABS 0.6 11/22/2021    DIFFTYPE AUTOMATED DIFFERENTIAL 11/22/2021     Chemistry:  Lab Results   Component Value Date     10/27/2021    K 3.6 10/27/2021     10/27/2021    CO2 26 10/27/2021    BUN 17 10/27/2021    CREATININE 1.1 10/27/2021    GLUCOSE 140 (H) 10/27/2021    CALCIUM 8.5 10/27/2021    PROT 4.8 (L) 10/22/2021    LABALBU 2.7 (L) 10/22/2021    BILITOT 0.5 10/22/2021    ALKPHOS 64 10/22/2021    AST 8 (L) 10/22/2021    ALT <5 (L) 10/22/2021    LABGLOM 47 (L) 10/27/2021    GFRAA 57 (L) 10/27/2021    AGRATIO 1.6 06/22/2020    GLOB 2.5 06/22/2020    PHOS 3.2 07/20/2021    MG 1.6 (L) 10/21/2021    POCGLU 89 07/16/2021     Lab Results   Component Value Date     10/03/2018     Lab Results   Component Value Date    TSHHS 1.570 07/17/2020    T4FREE 1.30 07/17/2020     Immunology:  Lab Results   Component Value Date    PROT 4.8 (L) 10/22/2021     Anemia Panel:  Lab Results   Component Value Date    SIIFEONX34 498.9 07/16/2021    FOLATE 6.4 07/16/2021        Observations:  Performance Status: 1  Symptoms, but ambulatory. Restricted in physically strenuous activity, but ambulatory and able to carry out work of a light or sedentary nature   Depression Status: PHQ-9 Total Score: 1 (11/22/2021  1:43 PM)    Cognitive Status: Mentally vague, Memory impairment and Normal concentration     Assessment & Plan:     1. She has macrocytic anemia and thrombocytopenia.,  B12 and folate level 2018 were within normal limits. Acute viral hepatitis serology was negative. Ultrasound of liver and spleen was negative for hepatosplenomegaly. Bone marrow study in October 2018 suggestive of low risk myelodysplasia. Labs in May 2019  were reviewed. CBC was stable. CBC in November 2019 was reviewed. CBC in March 2020 was stable. CBC 10/21/2020 revealed WBC 5.8, Hgb 10.1, .9, Platelets 772.   CBC in October 2021 was reviewed. CBC on 11/22/2021 was reviewed. If macrocytic anemia continues to get worse I may consider repeat bone marrow study. 2. She was diagnosed with Parkinson disease. 3.  Dehydration. I recommend to drink extra water. 4. Depression- refused medication or counseling; denies suicidal intent    5. Bruising is related to thin skin and aspirin. Return to clinic in 4 months or sooner. All of her question has been answered for today. Recent imaging and labs were reviewed and discussed with the patient.

## 2021-10-25 NOTE — PROGRESS NOTES
Progress Note      Subjective:   Chief complaint:dementia, s/p fall     Interval History: Sera Santos is a 82 y. o. female who presents with  frequent falls from her assisted living facility. recently moved her to assisted living on 10/18/2021.  Adri Mason has a history of dementia and unable to provide any HPI formation.  Her daughter states at least 3 falls in the past 2 days prior to admission. Today - patient is alert but confused, talking to herself. Had restful night last night. Did not require ativan. She ate breakfast with assistance. Denies CP, no SOB, no N, no V. Past medical history, surgical history, family history and social history reviewed and unchanged compared to H&P earlier this admission. Medications:   Scheduled Meds:   cephALEXin  250 mg Oral 3 times per day    tamsulosin  0.4 mg Oral QPM    QUEtiapine  25 mg Oral Daily    rivastigmine  1 patch TransDERmal Daily    QUEtiapine  50 mg Oral Nightly    aspirin  81 mg Oral Daily    carbidopa-levodopa  1 tablet Oral TID    latanoprost  1 drop Both Eyes Nightly     Continuous Infusions:    Objective:     Vital Signs  Temp: 96.4 °F (35.8 °C)  Pulse: 68  Resp: 16  BP: (!) 147/70  SpO2: 93 %  O2 Device: None (Room air)       Vital signs reviewed in electronic charts. Physical exam  General: alert, pleasantly confused, NAD. Follows simple commands  HRRR  LCTA  Ext: no edema  Skin: abrasions to face, hands/finger and feet with right 5th toe old bruising    Results:     Lab Results   Component Value Date    WBC 5.7 10/23/2021    HGB 9.2 (L) 10/23/2021    HCT 29.4 (L) 10/23/2021    MCV 99.3 10/23/2021    PLT 46 (L) 10/23/2021       Lab Results   Component Value Date     10/22/2021    K 3.7 10/22/2021     10/22/2021    CO2 17 10/22/2021    BUN 22 10/22/2021    CREATININE 1.4 10/22/2021    GLUCOSE 73 10/22/2021    CALCIUM 8.4 10/22/2021        Assessment and Plan:      Active Hospital Problems    Diagnosis Date Noted  Ambulatory dysfunction [R26.2] 10/21/2021     Ambulatory dysfunction resulting in fall with injury              Admit to [address] precautions              PT/OT for placement     UTI -   continue with keflex. Urine culture not sent - no results available.      Urine retention - probably due medications - (sinemet and detrol). D/C jordan,  D/C detrol ,continue flomax until discharge     Acute kidney injury on CKD stage IV - RESOLVED with IVF              - Cr 2.2 with baseline 1.5-1.6.                  Elevated troponin  - suspect demand ischemia associated with recent fall, anemia . - stable     Facial abrasion/contusion   Skin tear left wrist  Multiple contusions left shoulder              CT head/facial bones/cervical spine (10/20/2021)              No acute intracranial abnormality.                No acute fracture or dislocation in the facial and cranial bones.               No acute fracture or subluxation in the cervical spine.               [facility] care     Parkinson's disease    - patient is off nuplaza completely     Dementia with behavioral disturbances              continue Sinemet;  On seroquel 50mg at HS and will add 25mg at afternoon to try and prevent evening sundowning episode;  Prn ativan if sundowning severe     Myelodysplastic syndrome  Chronic anemia H/H 9.4/30.6-appears stable  Chronic thrombocytopenia PLT 55              DVT prophylaxis held due to declining trend of platelets     Other chronic comorbidities  Major depressive disorder  Glaucoma  Hypertension        Disposition: awaiting discharge to [facility] on Monday 10/25/21    Electronically signed by Aubree Baer DO on 10/25/2021 at 1:12 PM

## 2021-10-25 NOTE — PLAN OF CARE
Problem: Falls - Risk of:  Goal: Will remain free from falls  Description: Will remain free from falls  10/24/2021 2221 by Dannielle Romero RN  Outcome: Ongoing  10/24/2021 0913 by Leopoldo Memos, RN  Outcome: Ongoing  Goal: Absence of physical injury  Description: Absence of physical injury  10/24/2021 2221 by Dannielle Romero RN  Outcome: Ongoing  10/24/2021 0913 by Leopoldo Memos, RN  Outcome: Ongoing     Problem: Skin Integrity:  Goal: Will show no infection signs and symptoms  Description: Will show no infection signs and symptoms  10/24/2021 2221 by Dannielle Romero RN  Outcome: Ongoing  10/24/2021 0913 by Leopoldo Memos, RN  Outcome: Ongoing  Goal: Absence of new skin breakdown  Description: Absence of new skin breakdown  10/24/2021 2221 by Dannielle Romero RN  Outcome: Ongoing  10/24/2021 0913 by Leopoldo Memos, RN  Outcome: Ongoing     Problem: Infection:  Goal: Will remain free from infection  Description: Will remain free from infection  10/24/2021 2221 by Dannielle Romero RN  Outcome: Ongoing  10/24/2021 0913 by Leopoldo Memos, RN  Outcome: Ongoing     Problem: Safety:  Goal: Free from accidental physical injury  Description: Free from accidental physical injury  10/24/2021 2221 by Dannielle Romero RN  Outcome: Ongoing  10/24/2021 0913 by Leopoldo Memos, RN  Outcome: Ongoing     Problem: Daily Care:  Goal: Daily care needs are met  Description: Daily care needs are met  10/24/2021 2221 by Dannielle Romero RN  Outcome: Ongoing  10/24/2021 0913 by Leopoldo Memos, RN  Outcome: Ongoing     Problem: Pain:  Goal: Patient's pain/discomfort is manageable  Description: Patient's pain/discomfort is manageable  10/24/2021 2221 by Dannielle Romero RN  Outcome: Ongoing  10/24/2021 0913 by Leopoldo Memos, RN  Outcome: Ongoing  Goal: Pain level will decrease  Description: Pain level will decrease  10/24/2021 2221 by Dannielle Romero RN  Outcome: Ongoing  10/24/2021 0913 by Leopoldo Memos, RN  Outcome: Ongoing  Goal: Control of acute pain  Description: Control of acute pain  10/24/2021 2221 by Julian Cali RN  Outcome: Ongoing  10/24/2021 0913 by Walker Liriano RN  Outcome: Ongoing  Goal: Control of chronic pain  Description: Control of chronic pain  10/24/2021 2221 by Julian Cali RN  Outcome: Ongoing  10/24/2021 0913 by Walker Liriano RN  Outcome: Ongoing     Problem: Skin Integrity:  Goal: Skin integrity will stabilize  Description: Skin integrity will stabilize  10/24/2021 2221 by Julian Cali RN  Outcome: Ongoing  10/24/2021 0913 by Walker Liriano RN  Outcome: Ongoing     Problem: Discharge Planning:  Goal: Patients continuum of care needs are met  Description: Patients continuum of care needs are met  10/24/2021 2221 by Julian Cali RN  Outcome: Ongoing  10/24/2021 0913 by Walker Liriano RN  Outcome: Ongoing

## 2021-10-26 PROCEDURE — 97530 THERAPEUTIC ACTIVITIES: CPT

## 2021-10-26 PROCEDURE — 6370000000 HC RX 637 (ALT 250 FOR IP): Performed by: NURSE PRACTITIONER

## 2021-10-26 PROCEDURE — 2580000003 HC RX 258: Performed by: NURSE PRACTITIONER

## 2021-10-26 PROCEDURE — 6370000000 HC RX 637 (ALT 250 FOR IP): Performed by: HOSPITALIST

## 2021-10-26 PROCEDURE — 1200000000 HC SEMI PRIVATE

## 2021-10-26 RX ADMIN — QUETIAPINE FUMARATE 50 MG: 50 TABLET ORAL at 20:22

## 2021-10-26 RX ADMIN — CEPHALEXIN 250 MG: 250 CAPSULE ORAL at 05:52

## 2021-10-26 RX ADMIN — CEPHALEXIN 250 MG: 250 CAPSULE ORAL at 20:22

## 2021-10-26 RX ADMIN — CARBIDOPA AND LEVODOPA 1 TABLET: 25; 100 TABLET ORAL at 20:22

## 2021-10-26 RX ADMIN — TAMSULOSIN HYDROCHLORIDE 0.4 MG: 0.4 CAPSULE ORAL at 18:32

## 2021-10-26 RX ADMIN — ASPIRIN 81 MG CHEWABLE TABLET 81 MG: 81 TABLET CHEWABLE at 10:24

## 2021-10-26 RX ADMIN — CEPHALEXIN 250 MG: 250 CAPSULE ORAL at 15:33

## 2021-10-26 RX ADMIN — SODIUM CHLORIDE, PRESERVATIVE FREE 10 ML: 5 INJECTION INTRAVENOUS at 20:23

## 2021-10-26 RX ADMIN — SODIUM CHLORIDE, PRESERVATIVE FREE 10 ML: 5 INJECTION INTRAVENOUS at 10:26

## 2021-10-26 RX ADMIN — CARBIDOPA AND LEVODOPA 1 TABLET: 25; 100 TABLET ORAL at 10:25

## 2021-10-26 RX ADMIN — CARBIDOPA AND LEVODOPA 1 TABLET: 25; 100 TABLET ORAL at 15:34

## 2021-10-26 ASSESSMENT — PAIN SCALES - GENERAL
PAINLEVEL_OUTOF10: 0

## 2021-10-26 ASSESSMENT — PAIN SCALES - PAIN ASSESSMENT IN ADVANCED DEMENTIA (PAINAD)
FACIALEXPRESSION: 0
BREATHING: 0
TOTALSCORE: 0
BODYLANGUAGE: 0
CONSOLABILITY: 0
BREATHING: 0
NEGVOCALIZATION: 0
FACIALEXPRESSION: 0
BODYLANGUAGE: 0
CONSOLABILITY: 0
TOTALSCORE: 0
NEGVOCALIZATION: 0

## 2021-10-26 NOTE — PROGRESS NOTES
Progress Note      Subjective:   Chief complaint:dementia, s/p fall     Interval History: Sera Santos is a 82 y. o. female who presents with  frequent falls from her assisted living facility. recently moved her to assisted living on 10/18/2021.  Shary Schirmer has a history of dementia and unable to provide any HPI formation. Darcia Second daughter states at least 3 falls in the past 2 days prior to admission.      Patient had no  Agitation last night. This morning states \"Im just tired and want to sleep in/stay in bed awhile\"  \"just come back and check on me\". She is sleeping but easily awakens and converses. No N, no V, no pain    Past medical history, surgical history, family history and social history reviewed and unchanged compared to H&P earlier this admission. Medications:   Scheduled Meds:   cephALEXin  250 mg Oral 3 times per day    tamsulosin  0.4 mg Oral QPM    rivastigmine  1 patch TransDERmal Daily    QUEtiapine  50 mg Oral Nightly    aspirin  81 mg Oral Daily    carbidopa-levodopa  1 tablet Oral TID    latanoprost  1 drop Both Eyes Nightly     Continuous Infusions:    Objective:     Vital Signs  Temp: 96.6 °F (35.9 °C)  Pulse: 68  Resp: 16  BP: (!) 169/74  SpO2: 95 %  O2 Device: None (Room air)       Vital signs reviewed in electronic charts. Physical exam  General: sleeping but easily awakens to alert status but not oriented to place or events  HRRR  LCTA  Ext: no edema; Left forearm bandage with healing abrasion (dressing per wound care)    Results:     Lab Results   Component Value Date    WBC 5.7 10/23/2021    HGB 9.2 (L) 10/23/2021    HCT 29.4 (L) 10/23/2021    MCV 99.3 10/23/2021    PLT 46 (L) 10/23/2021       Lab Results   Component Value Date     10/22/2021    K 3.7 10/22/2021     10/22/2021    CO2 17 10/22/2021    BUN 22 10/22/2021    CREATININE 1.4 10/22/2021    GLUCOSE 73 10/22/2021    CALCIUM 8.4 10/22/2021        Assessment and Plan:      Active Hospital Problems Diagnosis Date Noted    Ambulatory dysfunction [R26.2] 10/21/2021     Ambulatory dysfunction resulting in fall with injury              Admit to [address] precautions              PT/OT for placement     UTI -   continue with keflex (day 6).  Urine culture not sent - no results available.      Urine retention -(RESOLVED) probably due medications - (sinemet and detrol).   D/C jordan,  D/C detrol ,continue flomax until discharge. No further urine retention     Acute kidney injury on CKD stage IV - RESOLVED with IVF              - Cr 2.2 with baseline 1.5-1.6.                  Elevated troponin  - suspect demand ischemia associated with recent fall, anemia . - stable     Facial abrasion/contusion   Skin tear left wrist  Multiple contusions left shoulder              CT head/facial bones/cervical spine (10/20/2021)              No acute intracranial abnormality.                No acute fracture or dislocation in the facial and cranial bones.               No acute fracture or subluxation in the cervical spine.               Wound care     Parkinson's disease    - patient is off nuplaza completely     Dementia with behavioral disturbances              continue Sinemet;  On seroquel 50mg at HS and will add 25mg at afternoon to try and prevent evening sundowning episode;  Prn ativan if sundowning severe     Myelodysplastic syndrome  Chronic anemia H/H 9.4/30.6-appears stable  Chronic thrombocytopenia PLT 55              DVT prophylaxis held due to declining trend of platelets     Other chronic comorbidities  Major depressive disorder  Glaucoma  Hypertension        Disposition: awaiting pre cert for SNF    Electronically signed by Hoda Christian DO on 10/26/2021 at 10:28 AM    Addendum 1220: spoke with Dr Ricardo Cheung for peer to peer review regarding denial of pre-certification based PT/OT notes

## 2021-10-26 NOTE — FLOWSHEET NOTE
Training, Neuromuscular Re-education, Cognitive Reorientation, Patient/Caregiver Education & Training, ROM, ADL/Self-care Training, Equipment Evaluation, Education, & procurement, Balance Training, IADL Training, Gait Training, Home Exercise Program, Functional Mobility Training, Safety Education & Training, Positioning, Cognitive/Perceptual Training (ther ex, ther act, bed mobility)      Objective Findings:    Date: 10/26/21 Date:  Date:  Date:  Date:    Bed mobility Max A x 2       Sit to stand transfer Mod-Max Assist x 2       Stand to sit transfer Mod A-Max A -mostly for saftey       Commode transfer Not performed today       Standing tolerance ~5 mins with Mod-Max A       Ambulation Mod-Max A with 2WW inside room; needed max verbal and manual cues to stay on task, and to be safe       Stairs Not performed today         Exercises:   Exercise/Equipment/Modalities  Date:   10/26/21 Date:  Date:  Date:    Seated balance Rated: Fair with Ant/Pot movement                                                                          Modality/intervention used:   [ ] Therapeutic Exercise   [ ] Modalities:   [ x] Therapeutic Activity     [ ] Ultrasound   [ ] Elec Stim   [x ] Gait Training     [ ] Cervical Traction  [ ] Lumbar Traction   [ ] Neuromuscular Re-education   [ ] Cold/hotpack  [ ] Iontophoresis   [ ] Instruction in HEP     [ ] Rosalita Standing   [ ] Manual Therapy   [ ] Aquatic Therapy     Other Therapeutic Activities: Seated on EOB; Poor safety awareness and slid off edge, required Max A x2 to keep her from falling and placing her back into her bed. She would benefit from PT to improve on her functional ability to be safe at home.     Home Exercise Program/Education provided to patient:     Manual Treatments:     Communication with other providers: COURTNEY    Adverse reactions to treatment:     Treatment/Activity Tolerance:   [ ] Patient limited by fatigue [ ] Patient limited by pain [ ] Patient limited by other medical complications [ ] Patient tolerated therapy well  [x ] Other: Cognitive issues limit patient's ability to be safe and follow purposeful instructions       Post Tx Pain Rating: N/a /10     Safety Precautions:   [ x] left in bed  [ ] left in chair [x ] call light within reach  [ ] bed alarm on  [ ] personal alarm on  [ ] other staff present:    Plan:   [x ] Continue per plan of care [ ] Yair Bailey current plan   [ ] Plan of care initiated [ ] Hold pending MD visit [ ] Discharge     Plan for Next Session:     Time In: 1320  Time Out: 1350  Total Treatment Minutes: 30'  Billed Units: 2 TA    Signed: Sherren Basset, PTA, 10/26/2021, 1:47 PM

## 2021-10-26 NOTE — PROGRESS NOTES
Occupational Therapy    Occupational Therapy Treatment Note  Name: Gigi Maloney MRN: [id number] :   1935   Date:  10/26/2021   Admission Date: 10/20/2021 Room:  [address] [place] [address]   Restrictions/Precautions:  Restrictions/Precautions  Restrictions/Precautions: Fall Risk     Communication with other providers:   Cleared for treatment by RN. Subjective:  Patient states:  Agreeable for therapy  Pain:   Location, Type, Intensity (0/10 to 10/10): No pain noted    Objective:    Observation: Confused and agitated at times      Treatment, including education:  Transfers  Supine to sit : Max A x 2  Sit to supine :Max A x 2  Scooting : Max A x 2  Sit to stand :MIn A x 2  Stand to sit :Min A x 2  SPT:Min A x 2 with max tactile/verbal cues for walker        Therapeutic Activity Training: Pt stood x 5 min with Min A x 2 with RW to facilitate increased endurance/strength for ADL tasks and transfers. Pt completed 15' of functional mobility with RW with Min A x 2 with max verbal/tactile cues using RW. Pt attempted second stand and pt's legs buckled within 1 min and required Max A x 2 to get back into bed. Safety Measures: Gait belt used, Left in bed, Pull/Bed Alarm activated and call light left in reach        Assessment / Impression:        Patient's tolerance of treatment: Good   Adverse Reaction: None  Significant change in status and impact:  None  Barriers to improvement:  Dec strength and endurance, confusion    Plan for Next Session:    Continue with POC.     Time in: 1320  Time out:  1350  Total treatment time:  30  Billed Units: 2 TA  Electronically signed by:    Cristiano Sanchez [address]    10/26/2021, 1:48 PM    Previously filed values:     Short term goals  Time Frame for Short term goals: Until DC or goals met  Short term goal 1: Pt will complete trfs with CGA  Short term goal 2: Pt will complete UE bathing/dressing with SBA and verbal cues to initiate  Short term goal 3: Pt will complete LE bathing/dressing with min A and verbal cues to intiate  Short term goal 4: Pt will complete toileting SBA  Short term goal 5: Pt will complete BUE therex 10+ min to increase strength/endurance for functional mobility, ADLs

## 2021-10-27 LAB
ANION GAP SERPL CALCULATED.3IONS-SCNC: 5 MMOL/L (ref 4–16)
BUN BLDV-MCNC: 17 MG/DL (ref 6–23)
CALCIUM SERPL-MCNC: 8.5 MG/DL (ref 8.3–10.6)
CHLORIDE BLD-SCNC: 108 MMOL/L (ref 99–110)
CO2: 26 MMOL/L (ref 21–32)
CREAT SERPL-MCNC: 1.1 MG/DL (ref 0.6–1.1)
GFR AFRICAN AMERICAN: 57 ML/MIN/1.73M2
GFR NON-AFRICAN AMERICAN: 47 ML/MIN/1.73M2
GLUCOSE BLD-MCNC: 140 MG/DL (ref 70–99)
POTASSIUM SERPL-SCNC: 3.6 MMOL/L (ref 3.5–5.1)
SODIUM BLD-SCNC: 139 MMOL/L (ref 135–145)

## 2021-10-27 PROCEDURE — 6370000000 HC RX 637 (ALT 250 FOR IP): Performed by: NURSE PRACTITIONER

## 2021-10-27 PROCEDURE — 80048 BASIC METABOLIC PNL TOTAL CA: CPT

## 2021-10-27 PROCEDURE — 36415 COLL VENOUS BLD VENIPUNCTURE: CPT

## 2021-10-27 PROCEDURE — 51798 US URINE CAPACITY MEASURE: CPT

## 2021-10-27 PROCEDURE — 94761 N-INVAS EAR/PLS OXIMETRY MLT: CPT

## 2021-10-27 PROCEDURE — 6370000000 HC RX 637 (ALT 250 FOR IP): Performed by: HOSPITALIST

## 2021-10-27 PROCEDURE — 1200000000 HC SEMI PRIVATE

## 2021-10-27 RX ADMIN — CARBIDOPA AND LEVODOPA 1 TABLET: 25; 100 TABLET ORAL at 13:16

## 2021-10-27 RX ADMIN — QUETIAPINE FUMARATE 50 MG: 50 TABLET ORAL at 19:45

## 2021-10-27 RX ADMIN — TAMSULOSIN HYDROCHLORIDE 0.4 MG: 0.4 CAPSULE ORAL at 17:32

## 2021-10-27 RX ADMIN — CEPHALEXIN 250 MG: 250 CAPSULE ORAL at 13:16

## 2021-10-27 RX ADMIN — CARBIDOPA AND LEVODOPA 1 TABLET: 25; 100 TABLET ORAL at 19:45

## 2021-10-27 RX ADMIN — ASPIRIN 81 MG CHEWABLE TABLET 81 MG: 81 TABLET CHEWABLE at 08:55

## 2021-10-27 RX ADMIN — CEPHALEXIN 250 MG: 250 CAPSULE ORAL at 20:02

## 2021-10-27 RX ADMIN — LORAZEPAM 0.25 MG: 0.5 TABLET ORAL at 19:45

## 2021-10-27 RX ADMIN — LATANOPROST 1 DROP: 50 SOLUTION OPHTHALMIC at 19:45

## 2021-10-27 RX ADMIN — CEPHALEXIN 250 MG: 250 CAPSULE ORAL at 08:55

## 2021-10-27 RX ADMIN — CARBIDOPA AND LEVODOPA 1 TABLET: 25; 100 TABLET ORAL at 08:55

## 2021-10-27 ASSESSMENT — PAIN SCALES - PAIN ASSESSMENT IN ADVANCED DEMENTIA (PAINAD)
BREATHING: 0
CONSOLABILITY: 0
CONSOLABILITY: 0
FACIALEXPRESSION: 0
BREATHING: 0
CONSOLABILITY: 0
TOTALSCORE: 0
FACIALEXPRESSION: 0
CONSOLABILITY: 0
NEGVOCALIZATION: 0
TOTALSCORE: 0
NEGVOCALIZATION: 0
BREATHING: 0
BODYLANGUAGE: 0
NEGVOCALIZATION: 0
FACIALEXPRESSION: 0
TOTALSCORE: 0
FACIALEXPRESSION: 0
BODYLANGUAGE: 0
NEGVOCALIZATION: 0
BODYLANGUAGE: 0
TOTALSCORE: 0
BREATHING: 0
BODYLANGUAGE: 0

## 2021-10-27 ASSESSMENT — PAIN SCALES - GENERAL
PAINLEVEL_OUTOF10: 0

## 2021-10-27 NOTE — PROGRESS NOTES
Comprehensive Nutrition Assessment    Type and Reason for Visit:  Reassess    Nutrition Recommendations/Plan: *Continue with current diet and supplements, offer snacks between meals. Nutrition Assessment:  Pt intakes remain 0-50% for meals, ovserved weight loss now down to 140# - recommend continuing with supplements and offering high calorie foods and snacks. Malnutrition Assessment:  Malnutrition Status: At risk for malnutrition (Comment)    Context:  Chronic Illness     Findings of the 6 clinical characteristics of malnutrition:      Estimated Daily Nutrient Needs:  Energy (kcal):  6028-2786; Weight Used for Energy Requirements:  Ideal     Protein (g):  68-82; Weight Used for Protein Requirements:  Ideal        Fluid (ml/day):  7189-5484; Method Used for Fluid Requirements:  1 ml/kcal      Nutrition Related Findings:  labs reviewed      Wounds:  Skin Tears       Anthropometric Measures:  · Height: 5' 7\" (170.2 cm)  · Current Body Weight: 140 lb (63.5 kg)   · Admission Body Weight: 150 lb (68 kg)    · Usual Body Weight: 162 lb (73.5 kg) (7/16/21 encounter)     · Ideal Body Weight: 135 lbs; % Ideal Body Weight 103.7 %   · BMI: 21.9  · Adjusted Body Weight:  ; No Adjustment   · BMI Categories: Normal Weight (BMI 22.0 to 24.9) age over 72       Nutrition Interventions:   Food and/or Nutrient Delivery:  Continue Current Diet, Continue Oral Nutrition Supplement  Nutrition Education/Counseling:  Education not appropriate   Coordination of Nutrition Care:  Continue to monitor while inpatient    Goals:  Oral intakes will improve to at least 51-75% of most meals and supplements during her stay       Nutrition Monitoring and Evaluation:   Behavioral-Environmental Outcomes:  None Identified   Food/Nutrient Intake Outcomes:  Food and Nutrient Intake, Supplement Intake  Physical Signs/Symptoms Outcomes:  Biochemical Data, Chewing or Swallowing, Meal Time Behavior, Skin, Weight     Discharge Planning:     Too soon to determine     Electronically signed by Chinmay Ladd DTR on 10/27/21 at 1:29 PM EDT

## 2021-10-27 NOTE — PROGRESS NOTES
Hospitalist Progress Note      Name:  Sarita Montague /Age/Sex: 1935  (82 y.o. female)   MRN & CSN:  [id number] & [id number] Admission Date/Time: 10/20/2021 11:01 PM   Location:  [facility][note 3][address] PCP: Derek Bonds MD         Hospital Day: 8    Assessment and Plan: This is a 31-year-old female that presented with frequent falls from her assisted living facility    Frequent falls  Facial abrasion  And tear to left wrist  Multiple contusions of left shoulder  -All imaging was unremarkable for any fractures  -PT and OT recommended SNF however patient was denied after also having a peer to peer  -Possible long-term care facility placement  -Given patient's severe dementia also question code status; family is supposed to bring in supporting paperwork and discuss code status    Urinary tract infection  -We will complete a total of 7 days antibiotics given urinary culture not sent    Urine retention  -Resolved    ERICKA on CKD  -Resolved with IV fluid    Elevated troponin  -Secondary to demand no further work-up was indicated earlier in the course of her admission    Parkinson's disease  -Continue Sinemet    Dementia with behavioral disturbances  Continue Seroquel    Mild dysplastic syndrome  Chronic Anemia  Chronic Thrombocytopenia  -Last set of labs drawn on 10/22  -Is to follow-up with her PCP and oncologist for further evaluation    Other chronic medical comorbidities  Major depressive disorder  Glaucoma  Hypertension      Diet ADULT DIET;  Regular  ADULT ORAL NUTRITION SUPPLEMENT; Breakfast, Lunch, Dinner; Clear Liquid Oral Supplement   DVT Prophylaxis [] Lovenox, []  Heparin, [] SCDs, [] Ambulation   GI Prophylaxis [] PPI,  [] H2 Blocker,  [] Carafate,  [] Diet/Tube Feeds   Code Status Full Code   Disposition Patient requires continued admission due to    MDM [] Low, [] Moderate,[]  High  Patient's risk as above due to      History of Present Illness:     Patient is resting comfortably this morning would not awaken for my interview; has been afebrile    Objective: Intake/Output Summary (Last 24 hours) at 10/27/2021 1021  Last data filed at 10/26/2021 1833  Gross per 24 hour   Intake 240 ml   Output --   Net 240 ml      Vitals:   Vitals:    10/27/21 0803   BP:    Pulse:    Resp:    Temp:    SpO2: 96%     Physical Exam:   GEN Awake female, sitting upright in bed in no apparent distress. Appears given age. EYES Pupils are equally round. No scleral erythema, discharge, or conjunctivitis. NECK Supple, no apparent thyromegaly or masses. RESP Clear to auscultation, no wheezes, rales or rhonchi. Symmetric chest movement while on room air. CARDIO/VASC S1/S2 auscultated. Regular rate without appreciable murmurs, rubs, or gallops.   GI Abdomen is soft   NEURO Cranial nerves appear grossly intact,  PSYCH Awake, alert but not oriented    Medications:   Medications:    cephALEXin  250 mg Oral 3 times per day    tamsulosin  0.4 mg Oral QPM    rivastigmine  1 patch TransDERmal Daily    QUEtiapine  50 mg Oral Nightly    aspirin  81 mg Oral Daily    carbidopa-levodopa  1 tablet Oral TID    latanoprost  1 drop Both Eyes Nightly      Infusions:   PRN Meds: LORazepam, 0.25 mg, Nightly PRN  sodium chloride flush, 10 mL, PRN  potassium chloride, 40 mEq, PRN   Or  potassium alternative oral replacement, 40 mEq, PRN  ondansetron, 4 mg, Q8H PRN  polyethylene glycol, 17 g, Daily PRN  nicotine polacrilex, 2 mg, Q1H PRN  acetaminophen, 650 mg, Q6H PRN   Or  acetaminophen, 650 mg, Q6H PRN        Electronically signed by Cecilio Dominguez MD on 10/27/2021 at 10:21 AM

## 2021-10-27 NOTE — PLAN OF CARE
Problem: Falls - Risk of:  Goal: Will remain free from falls  Description: Will remain free from falls  10/27/2021 0918 by Kika Paiz RN  Outcome: Ongoing  10/26/2021 2108 by Bettina Stewart RN  Outcome: Ongoing  Goal: Absence of physical injury  Description: Absence of physical injury  10/27/2021 0918 by Kika Paiz RN  Outcome: Ongoing  10/26/2021 2108 by Bettina Stewart RN  Outcome: Ongoing     Problem: Skin Integrity:  Goal: Will show no infection signs and symptoms  Description: Will show no infection signs and symptoms  10/27/2021 0918 by Kika Paiz RN  Outcome: Ongoing  10/26/2021 2108 by Bettina Stewart RN  Outcome: Ongoing  Goal: Absence of new skin breakdown  Description: Absence of new skin breakdown  10/27/2021 0918 by Kika Paiz RN  Outcome: Ongoing  10/26/2021 2108 by Bettina Stewart RN  Outcome: Ongoing     Problem: Infection:  Goal: Will remain free from infection  Description: Will remain free from infection  10/27/2021 0918 by Kika Paiz RN  Outcome: Ongoing  10/26/2021 2108 by Bettina Stewart RN  Outcome: Ongoing     Problem: Safety:  Goal: Free from accidental physical injury  Description: Free from accidental physical injury  10/27/2021 0918 by Kika Paiz RN  Outcome: Ongoing  10/26/2021 2108 by Bettina Stewart RN  Outcome: Ongoing     Problem: Daily Care:  Goal: Daily care needs are met  Description: Daily care needs are met  10/27/2021 0918 by Kika Paiz RN  Outcome: Ongoing  10/26/2021 2108 by Bettina Stewart RN  Outcome: Ongoing     Problem: Pain:  Goal: Patient's pain/discomfort is manageable  Description: Patient's pain/discomfort is manageable  10/27/2021 0918 by Kika Paiz RN  Outcome: Ongoing  10/26/2021 2108 by Bettina Stewart RN  Outcome: Ongoing  Goal: Pain level will decrease  Description: Pain level will decrease  10/27/2021 0918 by Kika Paiz RN  Outcome: Ongoing  10/26/2021 2108 by Bettina Stewart RN  Outcome: Ongoing  Goal: Control of acute pain  Description: Control of acute pain  10/27/2021 0918 by Yvette Harrell RN  Outcome: Ongoing  10/26/2021 2108 by Cristiane Alvarez RN  Outcome: Ongoing  Goal: Control of chronic pain  Description: Control of chronic pain  10/27/2021 0918 by Yvette Harrell RN  Outcome: Ongoing  10/26/2021 2108 by Cristiane Alvarez RN  Outcome: Ongoing     Problem: Skin Integrity:  Goal: Skin integrity will stabilize  Description: Skin integrity will stabilize  10/27/2021 0918 by Yvette Harrell RN  Outcome: Ongoing  10/26/2021 2108 by Cristiane Alvarez RN  Outcome: Ongoing

## 2021-10-27 NOTE — ADT AUTH CERT
Utilization Reviews       Neurology [address] Day 6 (10/26/2021) by Nidhi Israel RN       Review Status Review Entered   Completed 10/27/2021 14:25      Criteria Review      Care Day: 6 Care Date: 10/26/2021 Level of Care: Telemetry    Guideline Day 3    Level Of Care    ( ) * Activity level acceptable    (X) * Complete discharge planning    Clinical Status    (X) * No infection, or status acceptable    (X) * Isolation not needed, or status acceptable    (X) * Respiratory status acceptable    (X) * Pain and nausea absent or adequately managed    (X) * Ventilatory status acceptable    (X) * Neurologic problems absent or stabilized    (X) * Muscle or nerve damage absent or stable    ( ) * General Discharge Criteria met    Interventions    (X) * Intake acceptable    (X) * No inpatient interventions needed    * Milestone   Additional Notes   REVIEW DATE: 10/27/2021   CARE DATE: 10/26/2021      INTERNAL MEDICINE       Subjective:   Chief complaint:dementia, s/p fall       Interval History: Sera Santos is a 82 y. o. female who presents with  frequent falls from her assisted living facility.  recently moved her to assisted living on 10/18/2021.   Patient has a history of dementia and unable to provide any HPI formation. Reyna Ortega daughter states at least 3 falls in the past 2 days prior to admission.        Patient had no  Agitation last night.  This morning states \"Im just tired and want to sleep in/stay in bed awhile\"  \"just come back and check on me\".  She is sleeping but easily awakens and converses.  No N, no V, no pain      Vital Signs   Temp: 96.6 °F (35.9 °C)   Pulse: 68   Resp: 16   BP: (!) 169/74   SpO2: 95 %   O2 Device: None (Room air)          Physical exam   General: sleeping but easily awakens to alert status but not oriented to place or events   HRRR   LCTA   Ext: no edema;  Left forearm bandage with healing abrasion (dressing per wound care)             Lab Results   Component Value Date     WBC 5.7 10/23/2021     HGB 9.2 (L) 10/23/2021     HCT 29.4 (L) 10/23/2021     MCV 99.3 10/23/2021     PLT 46 (L) 10/23/2021          Assessment and Plan:          Active Hospital Problems     Diagnosis Date Noted   · Ambulatory dysfunction [R26.2] 10/21/2021       Ambulatory dysfunction resulting in fall with injury               Admit to [address] precautions               PT/OT for placement       UTI -   continue with keflex (day 6).  Urine culture not sent - no results available.        Urine retention -(RESOLVED) probably due medications - (sinemet and detrol).   D/C jordan,  D/C detrol ,continue flomax until discharge.  No further urine retention       Acute kidney injury on CKD stage IV - RESOLVED with IVF               - Cr 2.2 with baseline 1.5-1.6.                    Elevated troponin  - suspect demand ischemia associated with recent fall, anemia . - stable       Facial abrasion/contusion    Skin tear left wrist   Multiple contusions left shoulder               CT head/facial bones/cervical spine (10/20/2021)               No acute intracranial abnormality.                No acute fracture or dislocation in the facial and cranial bones.               No acute fracture or subluxation in the cervical spine.                Wound care       Parkinson's disease    - patient is off nuplaza completely       Dementia with behavioral disturbances               continue Sinemet;  On seroquel 50mg at HS and will add 25mg at afternoon to try and prevent evening sundowning episode;  Prn ativan if sundowning severe       Myelodysplastic syndrome   Chronic anemia H/H 9.4/30.6-appears stable   Chronic thrombocytopenia PLT 55               DVT prophylaxis held due to declining trend of platelets       Other chronic comorbidities   Major depressive disorder   Glaucoma   Hypertension           Disposition: awaiting pre cert for SNF          Scheduled Meds:      · cephALEXin  250 mg Oral 3 times per day   · tamsulosin  0.4 mg Oral QPM   · rivastigmine  1 patch TransDERmal Daily   · QUEtiapine  50 mg Oral Nightly   · aspirin  81 mg Oral Daily   · carbidopa-levodopa  1 tablet Oral TID   · latanoprost  1 drop Both Eyes Nightly               Neurology [address] - [facility] Day 5 (10/25/2021) by Carol Arndt RN       Review Status Review Entered   Completed 10/27/2021 14:02      Criteria Review      Care Day: 5 Care Date: 10/25/2021 Level of Care: Telemetry    Guideline Day 3    Level Of Care    ( ) * Activity level acceptable    10/27/2021 2:02 PM EDT by Kesha Christensen      Attempted to see patient this AM for Physical Therapy but was unable as the patient became agitated and refused to work with therapy. Penne Halsted Chess PTA    (X) * Complete discharge planning    Clinical Status    (X) * No infection, or status acceptable    (X) * Isolation not needed, or status acceptable    (X) * Respiratory status acceptable    (X) * Pain and nausea absent or adequately managed    (X) * Ventilatory status acceptable    (X) * Neurologic problems absent or stabilized    10/27/2021 2:02 PM EDT by Phuong Martinez confused did not require Ativan    (X) * Muscle or nerve damage absent or stable    ( ) * General Discharge Criteria met    10/27/2021 2:02 PM EDT by Kesha Christensen      Disposition: awaiting discharge to [facility] on Monday 10/25/21  NURSING: The patient's insurance pre-cert for placement at [facility] is still pending at this time    Interventions    (X) * Intake acceptable    (X) * No inpatient interventions needed    10/27/2021 2:02 PM EDT by Kesha Christensen    Subject: Additional Clinical Information    10/25/21 Review      VS 96.4, 68, 16, 147/70, 93% RA              No Imaging no labwork              Hospitalist Note      Today - patient is alert but confused, talking to herself.  Had restful night last night. Did not require ativan. She ate breakfast with assistance.  Denies CP, no SOB, no N, no V.       Ambulatory dysfunction [R26.2]    10/21/2021         Ambulatory dysfunction resulting in fall with injury                Admit to [address] precautions                PT/OT for placement         UTI -   continue with keflex.  Urine culture not sent - no results available.         Urine retention - probably due medications - (sinemet and detrol).   D/C jordan,  D/C detrol ,continue flomax until discharge         Acute kidney injury on CKD stage IV - RESOLVED with IVF                - Cr 2.2 with baseline 1.5-1.6.                      Elevated troponin  - suspect demand ischemia associated with recent fall, anemia . - stable         Facial abrasion/contusion    Skin tear left wrist    Multiple contusions left shoulder                CT head/facial bones/cervical spine (10/20/2021)                No acute intracranial abnormality.             No acute fracture or dislocation in the facial and cranial bones.                No acute fracture or subluxation in the cervical spine.                Wound care         Parkinson's disease    - patient is off [facility] completely         Dementia with behavioral disturbances                continue Sinemet;  On seroquel 50mg at HS and will add 25mg at afternoon to try and prevent evening sundowning episode;  Prn ativan if sundowning severe         Myelodysplastic syndrome    Chronic anemia H/H 9.4/30.6-appears stable    Chronic thrombocytopenia PLT 55                DVT prophylaxis held due to declining trend of platelets         Other chronic comorbidities    Major depressive disorder    Glaucoma    Hypertension                         * Milestone   Additional Notes   10/25/21 Review    VS 96.4, 68, 16, 147/70, 93% RA        No Imaging no labwork        Hospitalist Note    Today - patient is alert but confused, talking to herself.  Had restful night last night. Did not require ativan. She ate breakfast with assistance.  Denies CP, no SOB, no N, no V.   · Ambulatory dysfunction [R26.2]   10/21/2021       Ambulatory dysfunction resulting in fall with injury               Admit to [address] precautions               PT/OT for placement       UTI -   continue with keflex.  Urine culture not sent - no results available.        Urine retention - probably due medications - (sinemet and detrol).   D/C jordan,  D/C detrol ,continue flomax until discharge       Acute kidney injury on CKD stage IV - RESOLVED with IVF               - Cr 2.2 with baseline 1.5-1.6.                    Elevated troponin  - suspect demand ischemia associated with recent fall, anemia . - stable       Facial abrasion/contusion    Skin tear left wrist   Multiple contusions left shoulder               CT head/facial bones/cervical spine (10/20/2021)               No acute intracranial abnormality.                 No acute fracture or dislocation in the facial and cranial bones.                No acute fracture or subluxation in the cervical spine.                [facility] care       Parkinson's disease    - patient is off [facility] completely       Dementia with behavioral disturbances               continue Sinemet;  On seroquel 50mg at HS and will add 25mg at afternoon to try and prevent evening sundowning episode;  Prn ativan if sundowning severe       Myelodysplastic syndrome   Chronic anemia H/H 9.4/30.6-appears stable   Chronic thrombocytopenia PLT 55               DVT prophylaxis held due to declining trend of platelets       Other chronic comorbidities   Major depressive disorder   Glaucoma   Hypertension         Neurology [address] - Care Day 4 (10/24/2021) by Attila Church RN       Review Status Review Entered   Completed 10/27/2021 13:58      Criteria Review      Care Day: 4 Care Date: 10/24/2021 Level of Care: Telemetry    Guideline Day 3    Level Of Care    ( ) * Activity level acceptable    10/27/2021 1:58 PM EDT by Roro Hawkins      Not getting OOB    (X) * Complete discharge planning    Clinical Status    (X) * No infection, or status acceptable    (X) * Isolation not needed, or status acceptable    (X) * Respiratory status acceptable    (X) * Pain and nausea absent or adequately managed    (X) * Ventilatory status acceptable    ( ) * Neurologic problems absent or stabilized    10/27/2021 1:58 PM EDT by Xiomy Khalil confused-adjusting meds   continue Sinemet;  On seroquel 50mg at HS and will add 25mg at afternoon to try and prevent evening sundowning episode;  Prn ativan if sundowning severe  Ativan 0.25mg po prn x1    (X) * Muscle or nerve damage absent or stable    ( ) * General Discharge Criteria met    10/27/2021 1:58 PM EDT by Wolf Confer      Discharge to SNF Monday awaiting placement    Interventions    (X) * Intake acceptable    (X) * No inpatient interventions needed    10/27/2021 1:58 PM EDT by Wolf Confer    Subject: Additional Clinical Information    10/24/21 Reviw      VS 97.8, 73, 20, 155/72, 95% RA              No Imaging              No Labwork                      Hospitalist Note      This morning patient is eating breakfast with assistance.  She states feels tired and \"cant keep my eye open to use the typewrite\".    She states no complaints, no N, no V, no abdomen pain.  She is oriented to self but not place/events      Physical exam    General: alert, pleasantly confused, NAD.  Follows simple commands    HRRR    LCTA    Ext: no edema    Skin: abrasions to face, hands/finger and feet with right 5th toe old bruising              Ambulatory dysfunction resulting in fall with injury                Admit to [address] precautions                PT/OT for placement         UTI -   continue with keflex.  Urine culture not sent - no results available.         Urine retention - probably due medications - (sinemet and detrol).   D/C jordan,  D/C detrol ,continue flomax until discharge         Acute kidney injury on CKD stage IV - RESOLVED with IVF                - Cr 2.2 with baseline 1.5-1.6.                      Elevated troponin  - suspect demand ischemia associated with recent fall, anemia . - stable         Facial abrasion/contusion    Skin tear left wrist    Multiple contusions left shoulder                CT head/facial bones/cervical spine (10/20/2021)                No acute intracranial abnormality.             No acute fracture or dislocation in the facial and cranial bones.                No acute fracture or subluxation in the cervical spine.                [facility] care         Parkinson's disease    - patient is off nu[facility]aza completely         Dementia with behavioral disturbances                continue Sinemet;  On seroquel 50mg at HS and will add 25mg at afternoon to try and prevent evening sundowning episode;  Prn ativan if sundowning severe         Myelodysplastic syndrome    Chronic anemia H/H 9.4/30.6-appears stable    Chronic thrombocytopenia PLT 55                DVT prophylaxis held due to declining trend of platelets         Other chronic comorbidities    Major depressive disorder    Glaucoma    Hypertension              Disposition: [facility] 10[facility]25[facility]21       * Milestone   Additional Notes   10/24/21 Reviw    VS 97.8, 73, 20, 155/72, 95% RA        No Imaging        No Labwork            Hospitalist Note    This morning patient is eating breakfast with assistance.  She states feels tired and \"cant keep my eye open to use the typewrite\".    She states no complaints, no N, no V, no abdomen pain.  She is oriented to self but not place/events   Physical exam   General: alert, pleasantly confused, NAD.  Follows simple commands   HRRR   LCTA   Ext: no edema   Skin: abrasions to face, hands/finger and feet with right 5th toe old bruising       Ambulatory dysfunction resulting in fall with injury               Admit to [facility]               Fall precautions               PT/OT for placement       UTI -   continue with keflex.  Urine culture not sent - no results available.        Urine retention - probably due medications - (sinemet and detrol).   D/C jordan,  D/C detrol ,continue flomax until discharge       Acute kidney injury on CKD stage IV - RESOLVED with IVF               - Cr 2.2 with baseline 1.5-1.6.                    Elevated troponin  - suspect demand ischemia associated with recent fall, anemia . - stable       Facial abrasion/contusion    Skin tear left wrist   Multiple contusions left shoulder               CT head/facial bones/cervical spine (10/20/2021)               No acute intracranial abnormality.                 No acute fracture or dislocation in the facial and cranial bones.                No acute fracture or subluxation in the cervical spine.                Wound care       Parkinson's disease    - patient is off [facility] completely       Dementia with behavioral disturbances               continue Sinemet;  On seroquel 50mg at HS and will add 25mg at afternoon to try and prevent evening sundowning episode;  Prn ativan if sundowning severe       Myelodysplastic syndrome   Chronic anemia H/H 9.4/30.6-appears stable   Chronic thrombocytopenia PLT 55               DVT prophylaxis held due to declining trend of platelets       Other chronic comorbidities   Major depressive disorder   Glaucoma   Hypertension           Disposition: discharge to [facility] on Monday 10/25/21         Neurology [address] [place] [address] - Care Day 3 (10/23/2021) by Critsal Horton RN       Review Status Review Entered   Completed 10/27/2021 13:52      Criteria Review      Care Day: 3 Care Date: 10/23/2021 Level of Care: Telemetry    Guideline Day 3    Level Of Care    ( ) * Activity level acceptable    10/27/2021 1:52 PM EDT by Pardeep Gillespie      Ambulatory dysfunction resulting in fall with injury    (X) * Complete discharge planning    Clinical Status    (X) * No infection, or status acceptable    (X) * Isolation not needed, or status acceptable    (X) * Respiratory status acceptable    (X) * Pain and nausea absent or adequately managed    (X) * Ventilatory status acceptable    ( ) * Neurologic problems absent or stabilized    10/27/2021 1:52 PM EDT by Marisela Clark to have peroids of confusion and agitation IM Ativan given last night    (X) * Muscle or nerve damage absent or stable    ( ) * General Discharge Criteria met    10/27/2021 1:52 PM EDT by Carrillo Arellano placed for Monday    Interventions    (X) * Intake acceptable    (X) * No inpatient interventions needed    10/27/2021 1:52 PM EDT by Vidal Maurice    Subject: Additional Clinical Information    10/23/21 Review      VS 97.7, 68, 17, 165/87, 96% RA              No Imaging              Rbc 2.96, Hgb 9.2 Hct 29.4              Internal Med Note      Patient had episode last night of agitation, lost IV access, and was given 0.25mg IM ativan with improvement.  She is awake and alert this AM eating breakfast with assistance.  She states no complaints, no N, no V, no abdomen pain.  She is oriented to self but not place/events.         Past medical history, surgical history, family history and social history reviewed and unchanged compared to H&P earlier this admission      Physical exam    General: pleasantly confused, NAD    HRRR    LCTA              Ambulatory dysfunction resulting in fall with injury                Admit to [address] [place] [address],[address] precautions                PT/OT for placement         UTI -  no IV access, change rocephin to keflex.  Urine culture not sent - no results available.         Urine retention - probably due medications - (sinemet and detrol).  jordan cath anchored.  D/C detrol , start flomax and d/c jordan tomorrow with bladder retraining.         Acute kidney injury on CKD stage IV - RESOLVED with IVF                - Cr 2.2 with baseline 1.5-1.6.                      Elevated troponin  - suspect demand ischemia associated with recent fall, anemia . - stable         Facial abrasion/contusion    Skin tear left wrist    Multiple contusions left shoulder                CT head/facial bones/cervical spine (10/20/2021)                No acute intracranial abnormality.             No acute fracture or dislocation in the facial and cranial bones.                No acute fracture or subluxation in the cervical spine.                Wound care         Parkinson's disease    - patient is off nuplaza completely         Dementia with behavioral disturbances                continue Sinemet;  On seroquel 50mg at HS and will add 25mg at afternoon to try and prevent evening sundowning episode;  Prn ativan if sundowning severe         Myelodysplastic syndrome    Chronic anemia H/H 9.4/30.6-appears stable    Chronic thrombocytopenia PLT 55                DVT prophylaxis held due to declining trend of platelets         Other chronic comorbidities    Major depressive disorder    Glaucoma    Hypertension       * Milestone   Additional Notes   10/23/21 Review    VS 97.7, 68, 17, 165/87, 96% RA        No Imaging        Rbc 2.96, Hgb 9.2 Hct 29.4        Internal Med Note    Patient had episode last night of agitation, lost IV access, and was given 0.25mg IM ativan with improvement.  She is awake and alert this AM eating breakfast with assistance.  She states no complaints, no N, no V, no abdomen pain.  She is oriented to self but not place/events.        Past medical history, surgical history, family history and social history reviewed and unchanged compared to H&P earlier this admission   Physical exam   General: pleasantly confused, NAD   HRRR   LCTA       Ambulatory dysfunction resulting in fall with injury               Admit to [address] precautions               PT/OT for placement       UTI -  no IV access, change rocephin to keflex.  Urine culture not sent - no results available.       Urine retention - probably due medications - (sinemet and detrol).  jordan cath anchored.  D/C detrol , start flomax and d/c jordan tomorrow with bladder retraining.       Acute kidney injury on CKD stage IV - RESOLVED with IVF               - Cr 2.2 with baseline 1.5-1.6.                    Elevated troponin  - suspect demand ischemia associated with recent fall, anemia . - stable       Facial abrasion/contusion    Skin tear left wrist   Multiple contusions left shoulder               CT head/facial bones/cervical spine (10/20/2021)               No acute intracranial abnormality.                 No acute fracture or dislocation in the facial and cranial bones.                No acute fracture or subluxation in the cervical spine.                Wound care       Parkinson's disease    - patient is off nu[facility]aza completely       Dementia with behavioral disturbances               continue Sinemet;  On seroquel 50mg at HS and will add 25mg at afternoon to try and prevent evening sundowning episode;  Prn ativan if sundowning severe       Myelodysplastic syndrome   Chronic anemia H/H 9.4/30.6-appears stable   Chronic thrombocytopenia PLT 55               DVT prophylaxis held due to declining trend of platelets       Other chronic comorbidities   Major depressive disorder   Glaucoma   Hypertension

## 2021-10-28 VITALS
HEART RATE: 70 BPM | OXYGEN SATURATION: 99 % | WEIGHT: 140.8 LBS | TEMPERATURE: 96.9 F | DIASTOLIC BLOOD PRESSURE: 84 MMHG | BODY MASS INDEX: 22.1 KG/M2 | HEIGHT: 67 IN | RESPIRATION RATE: 16 BRPM | SYSTOLIC BLOOD PRESSURE: 176 MMHG

## 2021-10-28 PROBLEM — R26.2 AMBULATORY DYSFUNCTION: Status: RESOLVED | Noted: 2021-10-21 | Resolved: 2021-10-28

## 2021-10-28 LAB
SARS-COV-2, NAAT: NOT DETECTED
SOURCE: NORMAL

## 2021-10-28 PROCEDURE — 87635 SARS-COV-2 COVID-19 AMP PRB: CPT

## 2021-10-28 PROCEDURE — 6370000000 HC RX 637 (ALT 250 FOR IP): Performed by: HOSPITALIST

## 2021-10-28 PROCEDURE — 94761 N-INVAS EAR/PLS OXIMETRY MLT: CPT

## 2021-10-28 PROCEDURE — 97535 SELF CARE MNGMENT TRAINING: CPT

## 2021-10-28 PROCEDURE — 97530 THERAPEUTIC ACTIVITIES: CPT

## 2021-10-28 PROCEDURE — 6370000000 HC RX 637 (ALT 250 FOR IP): Performed by: NURSE PRACTITIONER

## 2021-10-28 RX ORDER — RIVASTIGMINE 4.6 MG/24H
1 PATCH, EXTENDED RELEASE TRANSDERMAL DAILY
Qty: 30 PATCH | Refills: 3
Start: 2021-10-29

## 2021-10-28 RX ADMIN — CARBIDOPA AND LEVODOPA 1 TABLET: 25; 100 TABLET ORAL at 09:01

## 2021-10-28 RX ADMIN — CEPHALEXIN 250 MG: 250 CAPSULE ORAL at 14:20

## 2021-10-28 RX ADMIN — CARBIDOPA AND LEVODOPA 1 TABLET: 25; 100 TABLET ORAL at 14:20

## 2021-10-28 RX ADMIN — ASPIRIN 81 MG CHEWABLE TABLET 81 MG: 81 TABLET CHEWABLE at 09:01

## 2021-10-28 RX ADMIN — CEPHALEXIN 250 MG: 250 CAPSULE ORAL at 06:34

## 2021-10-28 ASSESSMENT — PAIN SCALES - PAIN ASSESSMENT IN ADVANCED DEMENTIA (PAINAD)
TOTALSCORE: 0
CONSOLABILITY: 0
TOTALSCORE: 0
TOTALSCORE: 0
CONSOLABILITY: 0
CONSOLABILITY: 0
BODYLANGUAGE: 0
BODYLANGUAGE: 0
BREATHING: 0
FACIALEXPRESSION: 0
BREATHING: 0
BODYLANGUAGE: 0
BREATHING: 0
CONSOLABILITY: 0
NEGVOCALIZATION: 0
BREATHING: 0
FACIALEXPRESSION: 0
BREATHING: 0
BODYLANGUAGE: 0
FACIALEXPRESSION: 0
FACIALEXPRESSION: 0
CONSOLABILITY: 0
NEGVOCALIZATION: 0
BREATHING: 0
NEGVOCALIZATION: 0
BREATHING: 0
CONSOLABILITY: 0
TOTALSCORE: 0
NEGVOCALIZATION: 0
NEGVOCALIZATION: 0
BREATHING: 0
NEGVOCALIZATION: 0
CONSOLABILITY: 0
FACIALEXPRESSION: 0
BODYLANGUAGE: 0
NEGVOCALIZATION: 0
CONSOLABILITY: 0
TOTALSCORE: 0
BREATHING: 0
FACIALEXPRESSION: 0
BODYLANGUAGE: 0
FACIALEXPRESSION: 0
FACIALEXPRESSION: 0
TOTALSCORE: 0
BREATHING: 0
TOTALSCORE: 0
BODYLANGUAGE: 0
BODYLANGUAGE: 0
BREATHING: 0
NEGVOCALIZATION: 0
TOTALSCORE: 0
TOTALSCORE: 0
FACIALEXPRESSION: 0
BODYLANGUAGE: 0
CONSOLABILITY: 0
TOTALSCORE: 0
FACIALEXPRESSION: 0
TOTALSCORE: 0
FACIALEXPRESSION: 0
NEGVOCALIZATION: 0
NEGVOCALIZATION: 0
TOTALSCORE: 0
BODYLANGUAGE: 0
NEGVOCALIZATION: 0
TOTALSCORE: 0
BODYLANGUAGE: 0
CONSOLABILITY: 0
BREATHING: 0
CONSOLABILITY: 0
BODYLANGUAGE: 0
CONSOLABILITY: 0
FACIALEXPRESSION: 0
NEGVOCALIZATION: 0
BREATHING: 0
BREATHING: 0
TOTALSCORE: 0
CONSOLABILITY: 0
FACIALEXPRESSION: 0
CONSOLABILITY: 0
BREATHING: 0
NEGVOCALIZATION: 0
FACIALEXPRESSION: 0
TOTALSCORE: 0
BODYLANGUAGE: 0
CONSOLABILITY: 0
FACIALEXPRESSION: 0
BODYLANGUAGE: 0
BODYLANGUAGE: 0

## 2021-10-28 NOTE — PROGRESS NOTES
Occupational Therapy    Occupational Therapy Treatment Note  Name: Judy Hillman MRN: [id number] :   1935   Date:  10/28/2021   Admission Date: 10/20/2021 Room:  [address] [place] [address]   Restrictions/Precautions:  Restrictions/Precautions  Restrictions/Precautions: Fall Risk     Communication with other providers:   Cleared for treatment by RN. Subjective:  Patient states:  \"I need to pee\"  Pain:   Location, Type, Intensity (0/10 to 10/10): None noted    Objective:    Observation:  Pt alert and oriented. Treatment, including education:  Transfers  Supine to sit :Mod A at trunk with Max Verbal/tactile cues   Sit to supine :MOd A for B LEgs  Scooting :MIn A with Max Verbal/tactile cues   Sit to stand :Min A  with Max Verbal/tactile cues   Stand to sit :Min to Mod A due to confusion  SPT:MIn A  with Max Verbal/tactile cues   Toilet:Min A  with Max Verbal/tactile cues     Self Care Training:   Activities performed today included the following: Toileting  DEp for clothing management and toilet hygiene. Therapeutic Activity Training: Pt stood x 2-3 min with CGA with RW to facilitate increased endurance/strength for ADL tasks and transfers. Pt requires constant verbal/tactile cues for safety and walker management during functional mobility around bed to toilet Approx 14'. Pt required max verbal/physical cues to sit on toilet as pt was not able to recognize toilet or not able to cognitively process need to sit. Pt completed SPT transfer with constant cueing verbal/physical to stand and turn to get back into bed. Pt requires 24 hour care at [address] [place] [address] due to poor insight on any functional task. Pt was would be unsafe at home and would be at high risk for falling at home setting.            Safety Measures: Gait belt used, Left in bed, Pull/Bed Alarm activated and call light left in reach        Assessment / Impression:        Patient's tolerance of treatment: Fair   Adverse Reaction: None  Significant change in status and impact:  None  Barriers to improvement:  Dec cognition, dec strength and endurance. Plan for Next Session:    Continue with POC.     Time in:  1005  Time out:  1035  Total treatment time:  30  Billed Units: 1 TA, 1 ADL  Electronically signed by:    Danae Haney [address]    10/28/2021, 10:25 AM    Previously filed values:     Short term goals  Time Frame for Short term goals: Until DC or goals met  Short term goal 1: Pt will complete trfs with CGA  Short term goal 2: Pt will complete UE bathing/dressing with SBA and verbal cues to initiate  Short term goal 3: Pt will complete LE bathing/dressing with min A and verbal cues to intiate  Short term goal 4: Pt will complete toileting SBA  Short term goal 5: Pt will complete BUE therex 10+ min to increase strength/endurance for functional mobility, ADLs

## 2021-10-28 NOTE — PROGRESS NOTES
Hospitalist Progress Note      Name:  Roxi Moreno /Age/Sex: 1935  (82 y.o. female)   MRN & CSN:  [id number] & [id number] Admission Date/Time: 10/20/2021 11:01 PM   Location:  69/887-82 PCP: Bernie Galarza MD         Hospital Day: 9    Assessment and Plan: This is a 82-year-old female that presented with frequent falls from her assisted living facility    Frequent falls  Facial abrasion  And tear to left wrist  Multiple contusions of left shoulder  -All imaging was unremarkable for any fractures  -PT and OT recommended SNF however patient was denied after also having a peer to peer  -Possible long-term care facility placement    Urinary tract infection  -We will complete a total of 7 days antibiotics given urinary culture not sent    Urine retention  -Resolved    ERICKA on CKD  -Resolved with IV fluid    Elevated troponin  -Secondary to demand no further work-up was indicated earlier in the course of her admission    Parkinson's disease  -Continue Sinemet    Dementia with behavioral disturbances  Continue Seroquel    Mild dysplastic syndrome  Chronic Anemia  Chronic Thrombocytopenia  -Last set of labs drawn on 10/22  -Is to follow-up with her PCP and oncologist for further evaluation    Other chronic medical comorbidities  Major depressive disorder  Glaucoma  Hypertension      Diet ADULT DIET; Regular  ADULT ORAL NUTRITION SUPPLEMENT; Breakfast, Lunch, Dinner; Clear Liquid Oral Supplement   DVT Prophylaxis [] Lovenox, []  Heparin, [] SCDs, [] Ambulation   GI Prophylaxis [] PPI,  [] H2 Blocker,  [] Carafate,  [] Diet/Tube Feeds   Code Status Full Code   Disposition Patient requires continued admission due to    MDM [] Low, [] Moderate,[]  High  Patient's risk as above due to      History of Present Illness:     Resting comfortably and awake answering questions this morning; no nausea or vomiting, fevers or chills    Objective:        Intake/Output Summary (Last 24 hours) at 10/28/2021 0953  Last data filed at 10/27/2021 1351  Gross per 24 hour   Intake 480 ml   Output --   Net 480 ml      Vitals:   Vitals:    10/28/21 0737   BP: (!) 176/84   Pulse: 70   Resp: 16   Temp: 96.9 °F (36.1 °C)   SpO2: 99%     Physical Exam:   GEN Awake female, sitting upright in bed in no apparent distress. Appears given age. EYES Pupils are equally round. No scleral erythema, discharge, or conjunctivitis. NECK Supple, no apparent thyromegaly or masses. RESP Clear to auscultation, no wheezes, rales or rhonchi. Symmetric chest movement while on room air. CARDIO/VASC S1/S2 auscultated. Regular rate without appreciable murmurs, rubs, or gallops.   GI Abdomen is soft   NEURO Cranial nerves appear grossly intact,  PSYCH Awake, alert but not oriented    Medications:   Medications:    cephALEXin  250 mg Oral 3 times per day    tamsulosin  0.4 mg Oral QPM    rivastigmine  1 patch TransDERmal Daily    QUEtiapine  50 mg Oral Nightly    aspirin  81 mg Oral Daily    carbidopa-levodopa  1 tablet Oral TID    latanoprost  1 drop Both Eyes Nightly      Infusions:   PRN Meds: LORazepam, 0.25 mg, Nightly PRN  sodium chloride flush, 10 mL, PRN  potassium chloride, 40 mEq, PRN   Or  potassium alternative oral replacement, 40 mEq, PRN  ondansetron, 4 mg, Q8H PRN  polyethylene glycol, 17 g, Daily PRN  nicotine polacrilex, 2 mg, Q1H PRN  acetaminophen, 650 mg, Q6H PRN   Or  acetaminophen, 650 mg, Q6H PRN        Electronically signed by Chapo Ugarte MD on 10/28/2021 at 9:53 AM

## 2021-10-28 NOTE — PROGRESS NOTES
[facility] and spoke with Talia Trejo. All questions answered and transportation time given. Will keep monitoring.

## 2021-10-28 NOTE — CARE COORDINATION
1000  CM talked with the patient and her spouse at bedside. The patient is very hard to understand. She is very confused and attempting to get out the bed. She is easily redirected. The spouse and this CM had a long conversations about options. The spouse states he feels like its the hospital's fault that medicare is not paying for a rehab stay. CM explained how medicare works. The spouse understands that medicare will not pay if the patient is not going to get better. He is very tearful and states he is sad that the patient is not going to come home or go to [facility]. He states he is working with his daughter Sofie Morrison to get the patient into [facility]. [address] [place] [address] called this CM and stated they have upheld the appeal and are have denied the appeal. They state that the patient can make a third appeal if they desire. They will likely need updated therapy notes. They will have to complete the appeal by tomorrow at noon. CM talked to Sharath rao (who came to the hospital) about options. CM called the spouse Khang Springer and explained the different options. He states he wants this CM to call his daughter tomorrow. CM explained that the cost is $272 a day plus $75 a level of care which is a total of $347 per day. [facility] will need a check for 30 days upfront. Julieta states he is going to talk to his daughter and get back to this CM with what he wants to do tomorrow. Cliff also reminded that [facility] wants to know if [facility] will need 30 days notice to evict. He states he will talk to his daughter about this.     Cm will check in with patient's family to see what they want to do in the AM.
CM spoke with admissions at [facility] who confirmed that the insurance pre-cert was initiated and is still pending at this time. A bed will not be available until Monday 58/13 if the pre-cert is approved. 3:18 PM  PASRR completed online.
CM spoke with the patient's spouse Dena Joshi and the patient's daughter Carolyn Hugo on the phone to initiate discharge planning. Polly Toussaint confirmed that the patient was at the [facility] assisted living facility prior to admission. CM spoke about SNF placement should PT/OT recommend placement. Parth Madrigal stated that she has been looking into [facility] and Polly Norbert was in agreement with that as well. Parth Madrigal stated that she wanted to speak with her father more about placement and this CM agreed to follow-up with them later once the PT/OT recommendations are available. CM will follow. 9:52 AM  CM left a voicemail for admissions at [facility] requesting a return call regarding bed availability and possible referral.  CM will follow. 10:31 AM  CM spoke with Rodrigo Sosa with [facility] regarding possible referral.  Rodrigo Sosa stated that the patient's insurance will require pre-certification and she will review the patient to see if she possibly can be accepted. Rodrigo Sosa stated that if they decide they can accept the patient the earliest they may have a bed available would be Monday 10/25. PT/OT evaluations are still pending at this time. CM will follow. 2:42 PM  CM spoke with the patient's daughter Parth Madrigal on the phone to update that PT/OT recommends SNF placement. Parth Madrigal stated that she and her father are in agreement with placement at [facility]. CM left a message for Rodrigo Sosa with [facility] notifying her that the PT/OT evaluations have been completed and the family would like to proceed with placement. CM will follow.
Chart reviewed. Cm called Patricia Hammer the patient's daughter and she states she does not want to complete a 3rd appeal. She states that everyone is in agreement for the patient to go to [facility] private pay. She is going to take a personal check and the COVID vaccination card to [facility]. Cm called [facility]. They need a PASSR completed by this CM and a rapid covid completed. Cm called [facility] and ETA is set up for 3:45pm today. CM updated the doctor and the nurse and family. Nurse needs to call report to Phone: [phone] and include AVS and ANA MARIA and any prescriptions in packet to go with patient. Cm will fax all other information to [facility].
NURSING: The patient's insurance pre-cert for placement at [facility] is still pending at this time. If you the SNF receives approval after case management has left for the day they will call the nurse's station to notify of approval.  If you are notified by the SNF that the pre-cert has been approved please do the following: complete the [address] [place], [place] [address]s [place] form, call report to [phone], arrange transportation, and notify the patient's daughter Glenna Branham ([phone]). The patient will need a rapid COVID test prior to discharge.
requesting a return call. 2:36 PM  CM spoke with Jamila Rice at [facility] to notify her that updated PT/OT notes are now available. Jamila Rice was advised that this CM has spent 40 minutes making phone calls to [facility] [employer] trying to initiate an appeal to the P2P determination but have gotten nowhere. [facility] and then [facility] [employer] referred this CM back to [facility] department and was still unsuccessful in initiating an appeal.  Jamila Rice advised that she will check with her billing department to see what may she may be able to find out. CM also notified Jamila Rice that the patient's daughter Brit Kuo would be willing to pay privately if need be to get the patient placed at [facility]. 3:51 PM  CM spoke with representative with patient's insurance provider who stated that the appeal would have to be done through [facility] ([phone]). CM called [facility] to initiate appeal.  [facility] case control #: [id number], EMR chaney #: Miladis Hippo.      4:05 PM  CM submitted clinical documentation to [facility] to initiate appeal of SNF pre-cert denial.  Status of appeal can be checked at [email or web address] and by entering the above case control number and EMR key number.

## 2021-10-28 NOTE — DISCHARGE SUMMARY
Discharge Summary    Name:  Kevin Pratt /Age/Sex: 1935  (82 y.o. female)   MRN & CSN:  [id number] & [id number] Admission Date/Time: 10/20/2021 11:01 PM   Attending:  Juan Contreras MD Discharging Physician: Juan Contreras MD     Hospital Course: This is an 82-year-old female that presented with frequent falls from her assisted living facility. On admission patient was noted to have tears of her left wrist as well as a facial abrasion multiple contusions on her left shoulder. Patient had multiple scans as per below and all were negative for any acute fractures. Patient was noted to have urinary tract infection on admission was treated with IV antibiotics as well as an ERICKA on CKD that was treated with IV fluids and resolved. Patient's course was complicated by dementia with behavioral disturbances which did resolve prior to discharge. She was started on Exelon patch per admitting team which will need to be followed up with her primary care doctor. Patient was tolerating adequate p.o. and was in good spirits on the day of discharge. Patient was discharged in stable condition with close follow-up with her PCP.     Frequent falls  Facial abrasion  And tear to left wrist  Multiple contusions of left shoulder  -All imaging was unremarkable for any fractures  -Discharge to [facility]     Urinary tract infection  - Completed full antibiotic course while hospitalized     Urine retention  -Resolved     ERICKA on CKD  -Resolved with IV fluid     Elevated troponin  -Secondary to demand no further work-up was indicated earlier in the course of her admission     Parkinson's disease  -Continue Sinemet     Dementia with behavioral disturbances  - Continue Seroquel  - Exelon patch     Mild dysplastic syndrome  Chronic Anemia  Chronic Thrombocytopenia  -Last set of labs drawn on 10/22  -Is to follow-up with her PCP and oncologist for further evaluation     Other chronic medical comorbidities  Major depressive disorder  Glaucoma  Hypertension    The patient expressed appropriate understanding of and agreement with the discharge recommendations, medications, and plan. Consults this admission:  IP CONSULT TO CASE MANAGEMENT    Discharge Instruction:   Follow up appointments: PCP  Primary care physician:  within 2 weeks    Diet:  regular diet   Activity: activity as tolerated  Disposition: Discharged to:   []Home, [][facility], [x]SNF, []Acute Rehab, []Hospice   Condition on discharge: Stable    [facility] Medications:      [facility]   Home Medication Instructions F:[id number]    Printed on:10/28/21 7347   Medication Information                      aspirin 81 MG chewable tablet  Take 1 tablet by mouth daily             carbidopa-levodopa (SINEMET)  MG per tablet  TAKE 1 (ONE) TABLET BY MOUTH 3 (THREE) TIMES A DAY . latanoprost (XALATAN) 0.005 % ophthalmic solution  Place 1 drop into both eyes nightly              LINZESS 290 MCG CAPS capsule  TAKE 1 CAPSULE BY MOUTH EVERY DAY             QUEtiapine (SEROQUEL) 50 MG tablet  Take 50 mg by mouth nightly             rivastigmine (EXELON) 4.6 MG/24HR  Place 1 patch onto the skin daily             tolterodine (DETROL LA) 4 MG extended release capsule  Take 4 mg by mouth daily Dr. Mike Vernon                 Objective Findings at Discharge:   BP (!) 176/84   Pulse 70   Temp 96.9 °F (36.1 °C) (Temporal)   Resp 16   Ht 5' 7\" (1.702 m)   Wt 140 lb 12.8 oz (63.9 kg)   SpO2 99%   BMI 22.05 kg/m²            GEN    Awake female, sitting upright in bed in no apparent distress. Appears given age. EYES   Pupils are equally round. No scleral erythema, discharge, or conjunctivitis. NECK  Supple, no apparent thyromegaly or masses. RESP  Clear to auscultation, no wheezes, rales or rhonchi. Symmetric chest movement while on room air. CARDIO/VASC           S1/S2 auscultated. Regular rate without appreciable murmurs, rubs, or gallops.   GI Abdomen is soft   NEURO           Cranial nerves appear grossly intact,  PSYCH            Awake, alert but not oriented    BMP/CBC  Recent Labs     10/27/21  1000      K 3.6      CO2 26   BUN 17   CREATININE 1.1       IMAGING:  CT H:  No acute intracranial abnormality.       No acute fracture or dislocation in the facial and cranial bones.       No acute fracture or subluxation in the cervical spine. CT Facial Bones:  No acute intracranial abnormality.       No acute fracture or dislocation in the facial and cranial bones.       No acute fracture or subluxation in the cervical spine. CT C-spine:  No acute intracranial abnormality.       No acute fracture or dislocation in the facial and cranial bones.       No acute fracture or subluxation in the cervical spine. 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  Foci   of mineralization seen along the greater tuberosity, which may reflect   underlying calcific tendinosis without radiographic evidence of acute   fracture or dislocation of the left shoulder seen.       The bones appear demineralized.       RECOMMENDATION:   If there is a clinical concern for occult fracture, consider follow-up   examination in 7-10 days. XR Pelvis: The bones appear demineralized without convincing radiographic evidence of   displaced hip fracture or fracture of the bones of the pelvis seen.       RECOMMENDATION:   If there is a clinical concern for occult fracture, particularly of the hip,   MRI is recommended for further evaluation. CXR:  No radiographic evidence of acute pulmonary abnormality seen.  Overall, no   significant changes from the prior study are identified.          Discharge Time of 35 minutes    Electronically signed by Adam Mcdaniel MD on 10/28/2021 at 10:43 AM

## 2021-11-22 ENCOUNTER — HOSPITAL ENCOUNTER (OUTPATIENT)
Dept: INFUSION THERAPY | Age: 86
Discharge: HOME | End: 2021-11-22
Payer: MEDICARE

## 2021-11-22 ENCOUNTER — OFFICE VISIT (OUTPATIENT)
Dept: ONCOLOGY | Age: 86
End: 2021-11-22
Payer: MEDICARE

## 2021-11-22 VITALS
TEMPERATURE: 97.4 F | HEART RATE: 68 BPM | OXYGEN SATURATION: 98 % | SYSTOLIC BLOOD PRESSURE: 89 MMHG | BODY MASS INDEX: 22.7 KG/M2 | DIASTOLIC BLOOD PRESSURE: 57 MMHG | WEIGHT: 144.6 LBS | HEIGHT: 67 IN

## 2021-11-22 DIAGNOSIS — D64.9 ANEMIA, UNSPECIFIED TYPE: ICD-10-CM

## 2021-11-22 DIAGNOSIS — D69.6 THROMBOCYTOPENIA (HCC): Primary | ICD-10-CM

## 2021-11-22 LAB
BASOPHILS ABSOLUTE: 0 K/CU MM
BASOPHILS RELATIVE PERCENT: 0.2 % (ref 0–1)
DIFFERENTIAL TYPE: ABNORMAL
EOSINOPHILS ABSOLUTE: 0.1 K/CU MM
EOSINOPHILS RELATIVE PERCENT: 1.7 % (ref 0–3)
HCT VFR BLD CALC: 30.5 % (ref 37–47)
HEMOGLOBIN: 9.5 GM/DL (ref 12.5–16)
LYMPHOCYTES ABSOLUTE: 1.8 K/CU MM
LYMPHOCYTES RELATIVE PERCENT: 28.6 % (ref 24–44)
MCH RBC QN AUTO: 31.5 PG (ref 27–31)
MCHC RBC AUTO-ENTMCNC: 31.1 % (ref 32–36)
MCV RBC AUTO: 101 FL (ref 78–100)
MONOCYTES ABSOLUTE: 0.6 K/CU MM
MONOCYTES RELATIVE PERCENT: 8.9 % (ref 0–4)
PDW BLD-RTO: 18 % (ref 11.7–14.9)
PLATELET # BLD: 232 K/CU MM (ref 140–440)
PMV BLD AUTO: 12 FL (ref 7.5–11.1)
RBC # BLD: 3.02 M/CU MM (ref 4.2–5.4)
SEGMENTED NEUTROPHILS ABSOLUTE COUNT: 3.8 K/CU MM
SEGMENTED NEUTROPHILS RELATIVE PERCENT: 60.6 % (ref 36–66)
WBC # BLD: 6.3 K/CU MM (ref 4–10.5)

## 2021-11-22 PROCEDURE — 1111F DSCHRG MED/CURRENT MED MERGE: CPT | Performed by: INTERNAL MEDICINE

## 2021-11-22 PROCEDURE — G8427 DOCREV CUR MEDS BY ELIG CLIN: HCPCS | Performed by: INTERNAL MEDICINE

## 2021-11-22 PROCEDURE — G8420 CALC BMI NORM PARAMETERS: HCPCS | Performed by: INTERNAL MEDICINE

## 2021-11-22 PROCEDURE — 1123F ACP DISCUSS/DSCN MKR DOCD: CPT | Performed by: INTERNAL MEDICINE

## 2021-11-22 PROCEDURE — G8399 PT W/DXA RESULTS DOCUMENT: HCPCS | Performed by: INTERNAL MEDICINE

## 2021-11-22 PROCEDURE — 1036F TOBACCO NON-USER: CPT | Performed by: INTERNAL MEDICINE

## 2021-11-22 PROCEDURE — 99211 OFF/OP EST MAY X REQ PHY/QHP: CPT

## 2021-11-22 PROCEDURE — 99213 OFFICE O/P EST LOW 20 MIN: CPT | Performed by: INTERNAL MEDICINE

## 2021-11-22 PROCEDURE — G8484 FLU IMMUNIZE NO ADMIN: HCPCS | Performed by: INTERNAL MEDICINE

## 2021-11-22 PROCEDURE — 4040F PNEUMOC VAC/ADMIN/RCVD: CPT | Performed by: INTERNAL MEDICINE

## 2021-11-22 PROCEDURE — 36415 COLL VENOUS BLD VENIPUNCTURE: CPT

## 2021-11-22 PROCEDURE — 1090F PRES/ABSN URINE INCON ASSESS: CPT | Performed by: INTERNAL MEDICINE

## 2021-11-22 PROCEDURE — 85025 COMPLETE CBC W/AUTO DIFF WBC: CPT

## 2021-11-22 RX ORDER — METHOCARBAMOL 750 MG/1
750 TABLET, FILM COATED ORAL 2 TIMES DAILY
COMMUNITY

## 2021-11-22 ASSESSMENT — PATIENT HEALTH QUESTIONNAIRE - PHQ9
SUM OF ALL RESPONSES TO PHQ9 QUESTIONS 1 & 2: 1
SUM OF ALL RESPONSES TO PHQ QUESTIONS 1-9: 1
2. FEELING DOWN, DEPRESSED OR HOPELESS: 1
SUM OF ALL RESPONSES TO PHQ QUESTIONS 1-9: 1
SUM OF ALL RESPONSES TO PHQ QUESTIONS 1-9: 1
1. LITTLE INTEREST OR PLEASURE IN DOING THINGS: 0

## 2021-12-23 ENCOUNTER — APPOINTMENT (OUTPATIENT)
Dept: GENERAL RADIOLOGY | Age: 86
End: 2021-12-23
Payer: MEDICARE

## 2021-12-23 ENCOUNTER — HOSPITAL ENCOUNTER (EMERGENCY)
Age: 86
Discharge: HOME | End: 2021-12-23
Payer: MEDICARE

## 2021-12-23 ENCOUNTER — APPOINTMENT (OUTPATIENT)
Dept: CT IMAGING | Age: 86
End: 2021-12-23
Payer: MEDICARE

## 2021-12-23 VITALS
DIASTOLIC BLOOD PRESSURE: 82 MMHG | BODY MASS INDEX: 22.55 KG/M2 | WEIGHT: 144 LBS | OXYGEN SATURATION: 100 % | RESPIRATION RATE: 16 BRPM | HEART RATE: 78 BPM | SYSTOLIC BLOOD PRESSURE: 168 MMHG | TEMPERATURE: 98.4 F
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
## 2021-12-23 DIAGNOSIS — S51.011A ELBOW LACERATION, RIGHT, INITIAL ENCOUNTER: Primary | ICD-10-CM

## 2021-12-23 DIAGNOSIS — S61.213A LACERATION OF LEFT MIDDLE FINGER WITHOUT DAMAGE TO NAIL, FOREIGN BODY PRESENCE UNSPECIFIED, INITIAL ENCOUNTER: ICD-10-CM

## 2021-12-23 LAB
GLUCOSE BLD-MCNC: 81 MG/DL
GLUCOSE BLD-MCNC: 81 MG/DL (ref 70–99)

## 2021-12-23 PROCEDURE — 70450 CT HEAD/BRAIN W/O DYE: CPT

## 2021-12-23 PROCEDURE — 72125 CT NECK SPINE W/O DYE: CPT

## 2021-12-23 PROCEDURE — 72170 X-RAY EXAM OF PELVIS: CPT

## 2021-12-23 PROCEDURE — 99285 EMERGENCY DEPT VISIT HI MDM: CPT

## 2021-12-23 PROCEDURE — 73030 X-RAY EXAM OF SHOULDER: CPT

## 2021-12-23 PROCEDURE — 73070 X-RAY EXAM OF ELBOW: CPT

## 2021-12-23 PROCEDURE — 82962 GLUCOSE BLOOD TEST: CPT

## 2021-12-23 PROCEDURE — 71045 X-RAY EXAM CHEST 1 VIEW: CPT

## 2021-12-23 NOTE — ED NOTES
Bed: ED-32  Expected date:   Expected time:   Means of arrival:   Comments:  EMS      Travis Adams RN  12/23/21 8921

## 2021-12-23 NOTE — ED NOTES
Attempted to call report to nurse at [facility]. Nurse unable to answer phone, left message with staff for nurse to call back when available.       Mikhail Conrad RN  12/23/21 1263

## 2021-12-23 NOTE — ED NOTES
Removed pt's soiled depends and new one put on. Pt clean and dry. No other needs expressed. Report and paperwork given to [facility] crew and pt transported back to [facility] via [facility].       Melisa Wang RN  12/23/21 4571

## 2021-12-23 NOTE — ED PROVIDER NOTES
**ADVANCED PRACTICE PROVIDER, I HAVE EVALUATED THIS PATIENT**        [address] ENCOUNTER      Pt Name: Heriberto Morris  MGN:[id number]  [place] 1935  Date of evaluation: 12/23/2021  Provider: Yue Gunn PA-C      Chief Complaint:    Chief Complaint   Patient presents with    Fall         Nursing Notes, Past Medical Hx, Past Surgical Hx, Social Hx, Allergies, and Family Hx were all reviewed and agreed with or any disagreements were addressed in the HPI.    HPI: (Location, Duration, Timing, Severity, Quality, Assoc Sx, Context, Modifying factors)    Chief Complaint of fall      This is a  82 y.o. female who presents arrives via EMS with reported fall. Patient has history of dementia. EMS notes patient had a unwitnessed fall at her skilled nursing facility. Patient is suspected to have hit her head, has noted injury to her right elbow. Patient reportedly is at her baseline mentation and is alert and oriented. PastMedical/Surgical History:      Diagnosis Date    Colonoscopy refused     Glaucoma     Dr. Renate Seip Hyperlipidemia     Hypertension     Major depressive disorder with single episode 9/13/2018    Patient has depression and agitation. Was started on the Lexapro. 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 Antibiotic prophylaxis recommended by ortho when needed.     Thrombocytopenia ([address] 9/23/2018    Patient is referred to hematologist         Procedure Laterality Date    CATARACT REMOVAL Bilateral     CHOLECYSTECTOMY      HYSTERECTOMY Bilateral 1981    KNEE ARTHROSCOPY Left 2007    TOTAL KNEE ARTHROPLASTY Left 03/10/2017       Medications:  Discharge Medication List as of 12/23/2021  1:13 PM      CONTINUE these medications which have NOT CHANGED    Details   methocarbamol (ROBAXIN) 750 MG tablet Take 750 mg by mouth 2 times dailyHistorical Med      rivastigmine (EXELON) 4.6 MG/24HR Place 1 patch onto the skin daily, Disp-30 patch, R-3NO PRINT      QUEtiapine (SEROQUEL) 25 MG tablet Take 25 mg by mouth nightly Historical Med      aspirin 81 MG chewable tablet Take 1 tablet by mouth daily, Disp-30 tablet, R-3Normal      LINZESS 290 MCG CAPS capsule TAKE 1 CAPSULE BY MOUTH EVERY DAY, DAWHistorical Med      carbidopa-levodopa (SINEMET)  MG per tablet TAKE 1 (ONE) TABLET BY MOUTH 3 (THREE) TIMES A DAY . Historical Med      tolterodine (DETROL LA) 2 MG extended release capsule Take 2 mg by mouth daily Dr. Osvaldo Jacobsen      latanoprost (XALATAN) 0.005 % ophthalmic solution Place 1 drop into both eyes nightly Historical Med               Review of Systems:  (2-9 systems needed)  Review of Systems   Unable to perform ROS: Dementia       \"Positives and Pertinent negatives as per HPI\"    Physical Exam:  Physical Exam  Constitutional:       General: She is not in acute distress. Appearance: She is ill-appearing. She is not toxic-appearing. HENT:      Head: Normocephalic. Comments: Mild ecchymosis over right temple, without any swelling, deformities, or active bleeding     Nose: No rhinorrhea. Right Nostril: No septal hematoma. Eyes:      Extraocular Movements: Extraocular movements intact. Cardiovascular:      Rate and Rhythm: Normal rate. Pulmonary:      Breath sounds: No rales. Abdominal:      General: There is no distension. Tenderness: There is no abdominal tenderness. There is no guarding. Musculoskeletal:         General: Deformity present. Right elbow: Laceration present. Left hand: Laceration (middle finger) present. Cervical back: No rigidity or tenderness. Right hip: No deformity, tenderness or bony tenderness. Normal range of motion. Left hip: No deformity or bony tenderness. Normal range of motion. Right knee: No erythema or bony tenderness. No tenderness. Left knee: No erythema or bony tenderness. No tenderness. Right ankle: No tenderness. Left ankle: No tenderness. Lymphadenopathy:      Cervical: No cervical adenopathy. Skin:     General: Skin is warm. Neurological:      Mental Status: She is alert. Mental status is at baseline. Psychiatric:         Mood and Affect: Mood normal.         MEDICAL DECISION MAKING    Vitals:    Vitals:    12/23/21 0653 12/23/21 1105 12/23/21 1315   BP: (!) 142/90  (!) 168/82   Pulse: 77  78   Resp: 14  16   Temp: 98.4 °F (36.9 °C)     TempSrc: Oral     SpO2: 98%  100%   Weight:  144 lb (65.3 kg)        LABS:  Labs Reviewed   POCT GLUCOSE - Normal   POCT GLUCOSE        Remainder of labs reviewed and were negative at this time or not returned at the time of this note. RADIOLOGY:   Non-plain film images such as CT, Ultrasound and MRI are read by the radiologist. Jody Crisostomo PA-C have directly visualized the radiologic plain film image(s) with the below findings:      Interpretation per the Radiologist below, if available at the time of this note:    XR SHOULDER RIGHT (MIN 2 VIEWS)   Final Result   1. No acute fracture or dislocation. 2.  High riding right humeral head can be seen with chronic rotator cuff tear. XR PELVIS (1-2 VIEWS)   Final Result   No acute fracture. Dedicated CT imaging can be obtained if there is high   suspicion for focal injury. Repeat imaging can be obtained in 7-10 days if   there is persistent concern for injury. XR ELBOW RIGHT (2 VIEWS)   Final Result   No acute fracture. XR CHEST PORTABLE   Final Result   No evidence of acute traumatic injury. CT CERVICAL SPINE WO CONTRAST   Final Result   No acute abnormality of the cervical spine.          CT HEAD WO CONTRAST   Final Result   No acute intracranial abnormality. Images are mildly degraded by patient   motion. No results found. MEDICAL DECISION MAKING / ED COURSE:      PROCEDURES:   Procedures    None    Patient was given:  Medications - No data to display    Patient presents with above HPI. Squad vitals within normal limits, and the reported GCS 14. Patient has past medical history of dementia, kidney failure, Parkinson's. On my examination,  Patient is alert, talkative, but appears to be confused consistent with her history. Pelvis appears to be stable, noted laceration to her right elbow, patient does mention to me that her right shoulder hurts. Plans  Accu-Chek, imaging, wound re-evavl    Chest x-ray shows no evidence of acute traumatic injury. X-ray pelvis shows no acute fracture or dislocation. Elbow x-ray no acute fracture. Right shoulder x-ray shows no acute fracture, some evidence of potential rotator cuff issues, which I feel may be chronic. CT head shows no acute intracranial abnormalities. Accu-Chek within normal limits. Repeat evaluation, patient has a approximately 1.5 cm flap laceration over her elbow. Wound was irrigated copiously by myself with sterile saline. I was able to visualize the wound and well lit bloodless field. It does appear to be very superficial, more of a skin tear. This was closed with Steri-Strips. She was also bleeding from her her distal left middle finger. There is a small less than 0.5 cm skin tear, does not involve the nailbed. Hemostasis achieved with a small drop of skin adhesive. No bony tenderness or deformity. Reviewing patient's medical record from her facility. patient does have known history of Parkinson's, dementia. She does have history of muscle weakness, difficulty in walking. EMS documentation shows that patient was alert, staff reporting patient at her baseline mentation. At this time her vitals are within normal limits.   I do feel she is safe to return to her skilled nursing facility. The patient tolerated their visit well. I evaluated the patient. The physician was available for consultation as needed. The patient and / or the family were informed of the results of any tests, a time was given to answer questions, a plan was proposed and they agreed with plan. CLINICAL IMPRESSION:  1. Elbow laceration, right, initial encounter    2. Laceration of left middle finger without damage to nail, foreign body presence unspecified, initial encounter    3. Fall at nursing home, subsequent encounter        DISPOSITION Decision To Discharge 12/23/2021 10:37:54 AM      PATIENT REFERRED TO:  No follow-up provider specified.     DISCHARGE MEDICATIONS:  Discharge Medication List as of 12/23/2021  1:13 PM          DISCONTINUED MEDICATIONS:  Discharge Medication List as of 12/23/2021  1:13 PM                 (Please note the MDM and HPI sections of this note were completed with a voice recognition program.  Efforts were made to edit the dictations but occasionally words are mis-transcribed.)    Electronically signed, Yue Gunn PA-C,           Yue Gunn PA-C  12/24/21 0467

## 2021-12-24 ENCOUNTER — APPOINTMENT (OUTPATIENT)
Dept: CT IMAGING | Age: 86
End: 2021-12-24
Payer: MEDICARE

## 2021-12-24 ENCOUNTER — HOSPITAL ENCOUNTER (OUTPATIENT)
Age: 86
Setting detail: OBSERVATION
Discharge: NURSING, REHAB OR LONG-TERM CARE | End: 2021-12-29
Attending: STUDENT IN AN ORGANIZED HEALTH CARE EDUCATION/TRAINING PROGRAM | Admitting: INTERNAL MEDICINE
Payer: MEDICARE

## 2021-12-24 ENCOUNTER — APPOINTMENT (OUTPATIENT)
Dept: GENERAL RADIOLOGY | Age: 86
End: 2021-12-24
Payer: MEDICARE

## 2021-12-24 DIAGNOSIS — G93.40 ACUTE ENCEPHALOPATHY: Primary | ICD-10-CM

## 2021-12-24 DIAGNOSIS — U07.1 COVID-19: ICD-10-CM

## 2021-12-24 LAB
ALBUMIN SERPL-MCNC: 2.5 GM/DL (ref 3.4–5)
ALP BLD-CCNC: 69 IU/L (ref 40–129)
ALT SERPL-CCNC: <5 U/L (ref 10–40)
AMMONIA: 11 UMOL/L (ref 11–51)
ANION GAP SERPL CALCULATED.3IONS-SCNC: 9 MMOL/L (ref 4–16)
AST SERPL-CCNC: 20 IU/L (ref 15–37)
BASE EXCESS: 1 (ref 0–2.4)
BASOPHILS ABSOLUTE: 0 K/CU MM
BASOPHILS RELATIVE PERCENT: 0.3 % (ref 0–1)
BILIRUB SERPL-MCNC: 0.2 MG/DL (ref 0–1)
BUN BLDV-MCNC: 25 MG/DL (ref 6–23)
CALCIUM SERPL-MCNC: 7.9 MG/DL (ref 8.3–10.6)
CHLORIDE BLD-SCNC: 106 MMOL/L (ref 99–110)
CO2: 22 MMOL/L (ref 21–32)
COMMENT: ABNORMAL
CREAT SERPL-MCNC: 1 MG/DL (ref 0.6–1.1)
DIFFERENTIAL TYPE: ABNORMAL
EOSINOPHILS ABSOLUTE: 0 K/CU MM
EOSINOPHILS RELATIVE PERCENT: 0.3 % (ref 0–3)
GFR AFRICAN AMERICAN: >60 ML/MIN/1.73M2
GFR NON-AFRICAN AMERICAN: 53 ML/MIN/1.73M2
GLUCOSE BLD-MCNC: 102 MG/DL (ref 70–99)
GLUCOSE BLD-MCNC: 97 MG/DL (ref 70–99)
HCO3 VENOUS: 24.3 MMOL/L (ref 19–25)
HCT VFR BLD CALC: 29.1 % (ref 37–47)
HEMOGLOBIN: 8.7 GM/DL (ref 12.5–16)
IMMATURE NEUTROPHIL %: 0 % (ref 0–0.43)
LACTIC ACID, SEPSIS: 0.7 MMOL/L (ref 0.5–1.9)
LIPASE: 35 IU/L (ref 13–60)
LYMPHOCYTES ABSOLUTE: 0.9 K/CU MM
LYMPHOCYTES RELATIVE PERCENT: 27.8 % (ref 24–44)
MCH RBC QN AUTO: 33 PG (ref 27–31)
MCHC RBC AUTO-ENTMCNC: 29.9 % (ref 32–36)
MCV RBC AUTO: 110.2 FL (ref 78–100)
MONOCYTES ABSOLUTE: 0.4 K/CU MM
MONOCYTES RELATIVE PERCENT: 13.5 % (ref 0–4)
NUCLEATED RBC %: 0 %
O2 SAT, VEN: 93.1 % (ref 50–70)
PCO2, VEN: 41 MMHG (ref 38–52)
PDW BLD-RTO: 17.1 % (ref 11.7–14.9)
PH VENOUS: 7.38 (ref 7.32–7.42)
PLATELET # BLD: 226 K/CU MM (ref 140–440)
PMV BLD AUTO: 12.5 FL (ref 7.5–11.1)
PO2, VEN: 141 MMHG (ref 28–48)
POTASSIUM SERPL-SCNC: 4 MMOL/L (ref 3.5–5.1)
RAPID INFLUENZA  B AGN: NEGATIVE
RAPID INFLUENZA A AGN: NEGATIVE
RBC # BLD: 2.64 M/CU MM (ref 4.2–5.4)
SARS-COV-2, NAAT: DETECTED
SEGMENTED NEUTROPHILS ABSOLUTE COUNT: 1.9 K/CU MM
SEGMENTED NEUTROPHILS RELATIVE PERCENT: 58.1 % (ref 36–66)
SODIUM BLD-SCNC: 137 MMOL/L (ref 135–145)
SOURCE: ABNORMAL
TOTAL IMMATURE NEUTOROPHIL: 0 K/CU MM
TOTAL NUCLEATED RBC: 0 K/CU MM
TOTAL PROTEIN: 5.1 GM/DL (ref 6.4–8.2)
TROPONIN T: 0.07 NG/ML
TSH HIGH SENSITIVITY: 2.15 UIU/ML (ref 0.27–4.2)
WBC # BLD: 3.3 K/CU MM (ref 4–10.5)

## 2021-12-24 PROCEDURE — 82805 BLOOD GASES W/O2 SATURATION: CPT

## 2021-12-24 PROCEDURE — 87040 BLOOD CULTURE FOR BACTERIA: CPT

## 2021-12-24 PROCEDURE — 71260 CT THORAX DX C+: CPT

## 2021-12-24 PROCEDURE — 6360000002 HC RX W HCPCS: Performed by: NURSE PRACTITIONER

## 2021-12-24 PROCEDURE — 85025 COMPLETE CBC W/AUTO DIFF WBC: CPT

## 2021-12-24 PROCEDURE — 93005 ELECTROCARDIOGRAM TRACING: CPT | Performed by: STUDENT IN AN ORGANIZED HEALTH CARE EDUCATION/TRAINING PROGRAM

## 2021-12-24 PROCEDURE — 70498 CT ANGIOGRAPHY NECK: CPT

## 2021-12-24 PROCEDURE — 71045 X-RAY EXAM CHEST 1 VIEW: CPT

## 2021-12-24 PROCEDURE — 84443 ASSAY THYROID STIM HORMONE: CPT

## 2021-12-24 PROCEDURE — 83605 ASSAY OF LACTIC ACID: CPT

## 2021-12-24 PROCEDURE — 2580000003 HC RX 258: Performed by: STUDENT IN AN ORGANIZED HEALTH CARE EDUCATION/TRAINING PROGRAM

## 2021-12-24 PROCEDURE — 99285 EMERGENCY DEPT VISIT HI MDM: CPT

## 2021-12-24 PROCEDURE — 84484 ASSAY OF TROPONIN QUANT: CPT

## 2021-12-24 PROCEDURE — 87804 INFLUENZA ASSAY W/OPTIC: CPT

## 2021-12-24 PROCEDURE — G0378 HOSPITAL OBSERVATION PER HR: HCPCS

## 2021-12-24 PROCEDURE — 96372 THER/PROPH/DIAG INJ SC/IM: CPT

## 2021-12-24 PROCEDURE — 82140 ASSAY OF AMMONIA: CPT

## 2021-12-24 PROCEDURE — 87150 DNA/RNA AMPLIFIED PROBE: CPT

## 2021-12-24 PROCEDURE — 2580000003 HC RX 258: Performed by: NURSE PRACTITIONER

## 2021-12-24 PROCEDURE — 87635 SARS-COV-2 COVID-19 AMP PRB: CPT

## 2021-12-24 PROCEDURE — 6360000004 HC RX CONTRAST MEDICATION: Performed by: STUDENT IN AN ORGANIZED HEALTH CARE EDUCATION/TRAINING PROGRAM

## 2021-12-24 PROCEDURE — 83690 ASSAY OF LIPASE: CPT

## 2021-12-24 PROCEDURE — 70450 CT HEAD/BRAIN W/O DYE: CPT

## 2021-12-24 PROCEDURE — 74177 CT ABD & PELVIS W/CONTRAST: CPT

## 2021-12-24 PROCEDURE — 82962 GLUCOSE BLOOD TEST: CPT

## 2021-12-24 PROCEDURE — 80053 COMPREHEN METABOLIC PANEL: CPT

## 2021-12-24 RX ORDER — POLYETHYLENE GLYCOL 3350 17 G/17G
17 POWDER, FOR SOLUTION ORAL DAILY PRN
Status: DISCONTINUED | OUTPATIENT
Start: 2021-12-24 | End: 2021-12-29 | Stop reason: HOSPADM

## 2021-12-24 RX ORDER — SODIUM CHLORIDE 0.9 % (FLUSH) 0.9 %
10 SYRINGE (ML) INJECTION PRN
Status: DISCONTINUED | OUTPATIENT
Start: 2021-12-24 | End: 2021-12-29 | Stop reason: HOSPADM

## 2021-12-24 RX ORDER — ACETAMINOPHEN 650 MG/1
650 SUPPOSITORY RECTAL EVERY 6 HOURS PRN
Status: DISCONTINUED | OUTPATIENT
Start: 2021-12-24 | End: 2021-12-29 | Stop reason: HOSPADM

## 2021-12-24 RX ORDER — 0.9 % SODIUM CHLORIDE 0.9 %
1000 INTRAVENOUS SOLUTION INTRAVENOUS ONCE
Status: DISCONTINUED | OUTPATIENT
Start: 2021-12-24 | End: 2021-12-28

## 2021-12-24 RX ORDER — POLYETHYLENE GLYCOL 3350 17 G
2 POWDER IN PACKET (EA) ORAL
Status: DISCONTINUED | OUTPATIENT
Start: 2021-12-24 | End: 2021-12-29 | Stop reason: HOSPADM

## 2021-12-24 RX ORDER — ACETAMINOPHEN 325 MG/1
650 TABLET ORAL EVERY 6 HOURS PRN
Status: DISCONTINUED | OUTPATIENT
Start: 2021-12-24 | End: 2021-12-29 | Stop reason: HOSPADM

## 2021-12-24 RX ORDER — SODIUM CHLORIDE 0.9 % (FLUSH) 0.9 %
5-40 SYRINGE (ML) INJECTION EVERY 12 HOURS SCHEDULED
Status: DISCONTINUED | OUTPATIENT
Start: 2021-12-24 | End: 2021-12-29 | Stop reason: HOSPADM

## 2021-12-24 RX ORDER — 0.9 % SODIUM CHLORIDE 0.9 %
500 INTRAVENOUS SOLUTION INTRAVENOUS ONCE
Status: COMPLETED | OUTPATIENT
Start: 2021-12-24 | End: 2021-12-24

## 2021-12-24 RX ORDER — SODIUM CHLORIDE 9 MG/ML
25 INJECTION, SOLUTION INTRAVENOUS PRN
Status: DISCONTINUED | OUTPATIENT
Start: 2021-12-24 | End: 2021-12-29 | Stop reason: HOSPADM

## 2021-12-24 RX ORDER — ONDANSETRON 2 MG/ML
4 INJECTION INTRAMUSCULAR; INTRAVENOUS EVERY 6 HOURS PRN
Status: DISCONTINUED | OUTPATIENT
Start: 2021-12-24 | End: 2021-12-29 | Stop reason: HOSPADM

## 2021-12-24 RX ADMIN — SODIUM CHLORIDE, PRESERVATIVE FREE 10 ML: 5 INJECTION INTRAVENOUS at 20:15

## 2021-12-24 RX ADMIN — SODIUM CHLORIDE 500 ML: 9 INJECTION, SOLUTION INTRAVENOUS at 15:08

## 2021-12-24 RX ADMIN — IOPAMIDOL 80 ML: 755 INJECTION, SOLUTION INTRAVENOUS at 13:48

## 2021-12-24 RX ADMIN — ENOXAPARIN SODIUM 40 MG: 100 INJECTION SUBCUTANEOUS at 20:15

## 2021-12-24 NOTE — H&P
History and Physical      Name:  Magdy Knott /Age/Sex: 1935  (82 y.o. female)   MRN & CSN:  [id number] & [id number] Admission Date/Time: 2021 12:24 PM   Location:  [id number] PCP: Ricky Belcher MD       Hospital Day: 1    Assessment and Plan:   Magdy Knott is a 82 y.o.  female with history of Parkinson's and dementia presents with acute mental status change. Acute encephalopathy  -Admit to observation  -MRI brain, neurology consult  -PT/OT/SLT evaluation, case management consult for discharge planning  -UA pending   -chest xray no acute change    Covid virus infection  -On room air  -Continue supportive care    Parkinson's  Dementia   -Hold all oral medication until SLP evaluation    DVT prophylaxis  -Lovenox 40 mg daily    Diet Diet NPO   DVT Prophylaxis [x] Lovenox, []  Heparin, [] SCDs, [] Ambulation   GI Prophylaxis [] PPI,  [] H2 Blocker,  [] Carafate,  [] Diet/Tube Feeds   Code Status Prior   Disposition Patient requires continued admission due to medical condition     History of Present Illness:     Chief Complaint: Mental status change    Magdy Knott is a 82 y.o.  female with a history of Parkinson's disease and dementia who lives in nursing home presents with mental status change. Patient visited ED yesterday for mechanical fall. She had unremarkable imaginings and was discharged back to nursing home. The staff in nursing home found patient has mental status change in 10:00 this morning. Patient only responded to sternal rub. Then patient was sent to ED by ambulance. Patient started open her eyes and talking in the ED. Patient has been stated it was her baseline now. Stroke alert called in ED. Patient is not candidate for TPA treatment. Patient will be admitted to observation for MRI brain and neurology consult.  Patient's  and daughter discussed in the phone and agreed to change patient's CODE STATUS to DNR CCA with no intubation, no CRP, no NG tube.       Ten point ROS reviewed negative, unless as noted above    Objective:   No intake or output data in the 24 hours ending 12/24/21 1643   Vitals:   Vitals:    12/24/21 1303   BP: 107/61   Pulse: 80   Resp:    Temp:    SpO2: 98%     Physical Exam:   GEN drowsiness, arousable to voice, talking by herself, with slurry speech, not able to communicate. EYES Pupils are equally round. No scleral erythema, discharge, or conjunctivitis. HENT Mucous membranes are moist. Oral pharynx without exudates, no evidence of thrush. NECK Supple, no apparent thyromegaly or masses. RESP Clear to auscultation, no wheezes, rales or rhonchi. Symmetric chest movement while on room air. CARDIO/VASC S1/S2 auscultated. Regular rate without appreciable murmurs, rubs, or gallops. No JVD or carotid bruits. Peripheral pulses equal bilaterally and palpable. No peripheral edema. GI Abdomen is soft without significant tenderness, masses, or guarding. Bowel sounds are normoactive. Rectal exam deferred.  No costovertebral angle tenderness. Normal appearing external genitalia. Roper catheter is not present. HEME/LYMPH No palpable cervical lymphadenopathy and no hepatosplenomegaly. No petechiae or ecchymoses. MSK No gross joint deformities. SKIN Normal coloration, warm, dry. NEURO slurry speech (baseline), moving bilateral upper extremities. PSYCH Awake, alert, oriented x 4. Affect appropriate. Past Medical History:      Past Medical History:   Diagnosis Date    Colonoscopy refused     Glaucoma     Dr. Luis Head Hyperlipidemia     Hypertension     Major depressive disorder with single episode 9/13/2018    Patient has depression and agitation. Was started on the Lexapro.  Myelodysplastic syndrome ([address] 10/22/2018    Dx 10/2018 : Myelodysplastic syndrome with multilineage dysplasia Dr Young Command    Obesity     Status post total left knee replacement 4/27/2017    Pt had  Left TKA on 3/2017 in [place] Dr Julio Cesar Nino. Antibiotic prophylaxis recommended by ortho when needed.  Thrombocytopenia ([facility] [address] 9/23/2018    Patient is referred to hematologist     PSHX:  has a past surgical history that includes Cholecystectomy; Hysterectomy (Bilateral, 1981); Knee arthroscopy (Left, 2007); Cataract removal (Bilateral); and Total knee arthroplasty (Left, 03/10/2017). Allergies: No Known Allergies    FAM HX: family history is not on file. Soc HX:   Social History     Socioeconomic History    Marital status:      Spouse name: Not on file    Number of children: Not on file    Years of education: Not on file    Highest education level: Not on file   Occupational History    Not on file   Tobacco Use    Smoking status: Never Smoker    Smokeless tobacco: Never Used   Vaping Use    Vaping Use: Never used   Substance and Sexual Activity    Alcohol use: No     Alcohol/week: 0.0 standard drinks    Drug use: No    Sexual activity: Yes     Partners: Male   Other Topics Concern    Not on file   Social History Narrative    Not on file     Social Determinants of Health     Financial Resource Strain: Medium Risk    Difficulty of Paying Living Expenses: Somewhat hard   Food Insecurity: No Food Insecurity    Worried About Running Out of Food in the Last Year: Never true    Liya of Food in the Last Year: Never true   Transportation Needs: No Transportation Needs    Lack of Transportation (Medical): No    Lack of Transportation (Non-Medical):  No   Physical Activity:     Days of Exercise per Week: Not on file    Minutes of Exercise per Session: Not on file   Stress:     Feeling of Stress : Not on file   Social Connections:     Frequency of Communication with Friends and Family: Not on file    Frequency of Social Gatherings with Friends and Family: Not on file    Attends Baptism Services: Not on file    Active Member of Clubs or Organizations: Not on file    Attends Club or Organization Meetings: Not on file    Marital Status: Not on file   Intimate Partner Violence:     Fear of Current or Ex-Partner: Not on file    Emotionally Abused: Not on file    Physically Abused: Not on file    Sexually Abused: Not on file   Housing Stability:     Unable to Pay for Housing in the Last Year: Not on file    Number of [place] in the Last Year: Not on file    Unstable Housing in the Last Year: Not on file       Medications:   Medications:    sodium chloride  1,000 mL IntraVENous Once    sodium chloride  500 mL IntraVENous Once      Infusions:   PRN Meds:        Electronically signed by Mora Hernandez on 12/24/2021 at 4:43 PM

## 2021-12-24 NOTE — CODE DOCUMENTATION
assessed pt via telestroke and stated pt is not TPA candidate and suspects encephalopathy.  Pt is now talking and able to follow simple directions with frequent redirection

## 2021-12-24 NOTE — ED NOTES
Bed: ED-32  Expected date:   Expected time:   Means of arrival:   Comments:  Hold- pt returning     Frances Carmona RN  12/24/21 2363

## 2021-12-24 NOTE — ED PROVIDER NOTES
Emergency Department Encounter    Patient: Rahel Fenton  MRN: [id number]  : 1935  Date of Evaluation: 2021  ED Provider:  Abel Lunsford MD    Triage Chief Complaint:   Altered Mental Status    [place]:  Rahel Fenton is a 82 y.o. female with history seen below presenting with altered mental status. Patient presented yesterday for a fall. CT head, C-spine was obtained and was negative. Patient was discharged back to facility for schooling. They state when they evaluate patient this morning patient was altered and only responding to sternal rub. Vitals were reassuring per EMS. On my evaluation patient has eyes closed but with any painful stimuli is pushing me away. Patient is moving her mask up on her head. Patient is moving all extremities. History is limited secondary to patient's clinical status. Per EMS report daughter states patient has ignored people previously and acts as though she is not responding. ROS - see HPI, below listed is current ROS at time of my eval:  Unable to obtain secondary to patient clinical status    Past Medical History:   Diagnosis Date    Colonoscopy refused     Glaucoma     Dr. Logan Vila    Hyperlipidemia     Hypertension     Major depressive disorder with single episode 2018    Patient has depression and agitation. Was started on the Lexapro.  Myelodysplastic syndrome ([address] 10/22/2018    Dx 10/2018 : Myelodysplastic syndrome with multilineage dysplasia Dr Prakash Tomlinson    Obesity     Status post total left knee replacement 2017    Pt had  Left TKA on 3/2017 in [place] Dr Chaitanya Richtre. Antibiotic prophylaxis recommended by ortho when needed.     Thrombocytopenia ([address] 2018    Patient is referred to hematologist     Past Surgical History:   Procedure Laterality Date    CATARACT REMOVAL Bilateral     CHOLECYSTECTOMY      HYSTERECTOMY Bilateral     KNEE ARTHROSCOPY Left     TOTAL KNEE ARTHROPLASTY Left 03/10/2017     No family history on file. Social History     Socioeconomic History    Marital status:      Spouse name: Not on file    Number of children: Not on file    Years of education: Not on file    Highest education level: Not on file   Occupational History    Not on file   Tobacco Use    Smoking status: Never Smoker    Smokeless tobacco: Never Used   Vaping Use    Vaping Use: Never used   Substance and Sexual Activity    Alcohol use: No     Alcohol/week: 0.0 standard drinks    Drug use: No    Sexual activity: Yes     Partners: Male   Other Topics Concern    Not on file   Social History Narrative    Not on file     Social Determinants of Health     Financial Resource Strain: Medium Risk    Difficulty of Paying Living Expenses: Somewhat hard   Food Insecurity: No Food Insecurity    Worried About Running Out of Food in the Last Year: Never true    Liya of Food in the Last Year: Never true   Transportation Needs: No Transportation Needs    Lack of Transportation (Medical): No    Lack of Transportation (Non-Medical):  No   Physical Activity:     Days of Exercise per Week: Not on file    Minutes of Exercise per Session: Not on file   Stress:     Feeling of Stress : Not on file   Social Connections:     Frequency of Communication with Friends and Family: Not on file    Frequency of Social Gatherings with Friends and Family: Not on file    Attends Hindu Services: Not on file    Active Member of Clubs or Organizations: Not on file    Attends Club or Organization Meetings: Not on file    Marital Status: Not on file   Intimate Partner Violence:     Fear of Current or Ex-Partner: Not on file    Emotionally Abused: Not on file    Physically Abused: Not on file    Sexually Abused: Not on file   Housing Stability:     Unable to Pay for Housing in the Last Year: Not on file    Number of [place] in the Last Year: Not on file    Unstable Housing in the Last Year: Not on file     No current facility-administered medications for this encounter. Current Outpatient Medications   Medication Sig Dispense Refill    methocarbamol (ROBAXIN) 750 MG tablet Take 750 mg by mouth 2 times daily      rivastigmine (EXELON) 4.6 MG/24HR Place 1 patch onto the skin daily 30 patch 3    QUEtiapine (SEROQUEL) 25 MG tablet Take 25 mg by mouth nightly       aspirin 81 MG chewable tablet Take 1 tablet by mouth daily 30 tablet 3    LINZESS 290 MCG CAPS capsule TAKE 1 CAPSULE BY MOUTH EVERY DAY      carbidopa-levodopa (SINEMET)  MG per tablet TAKE 1 (ONE) TABLET BY MOUTH 3 (THREE) TIMES A DAY .  tolterodine (DETROL LA) 2 MG extended release capsule Take 2 mg by mouth daily Dr. Kady Willard       latanoprost (XALATAN) 0.005 % ophthalmic solution Place 1 drop into both eyes nightly        No Known Allergies    Nursing Notes Reviewed    Physical Exam:  Triage VS:    ED Triage Vitals [12/24/21 1234]   Enc Vitals Group      BP       Pulse       Resp       Temp 97.9 °F (36.6 °C)      Temp Source Axillary      SpO2       Weight       Height       Head Circumference       Peak Flow       Pain Score       Pain Loc       Pain Edu? Excl. in [address]? My pulse ox interpretation is - normal    General appearance:  No acute distress. Skin:  Warm. Dry. Eye:  Extraocular movements intact. Ears, nose, mouth and throat:  Oral mucosa moist   Neck:  Trachea midline. Extremity:  No swelling. Normal ROM     Heart:  Regular rate and rhythm, normal S1 & S2, no extra heart sounds. Perfusion:  intact  Respiratory:  Lungs clear to auscultation bilaterally. Respirations nonlabored. Abdominal:  Normal bowel sounds. Soft. Nontender. Non distended. Back:  No CVA tenderness to palpation     Neurological: Closing eyes but with any stimulation position UA. Patient is moving mask around on her face. Patient is moving all extremities. .  No focal neuro deficits.              Psychiatric: Appropriate    I have reviewed and interpreted all of the currently available lab results from this visit (if applicable):  Results for orders placed or performed during the hospital encounter of 12/24/21   POCT Glucose   Result Value Ref Range    POC Glucose 97 70 - 99 MG/DL      Radiographs (if obtained):  Radiologist's Report Reviewed:  No results found. EKG (if obtained): (All EKG's are interpreted by myself in the absence of a cardiologist)  Sinus bradycardia, ventricular rate 57, NY interval 148, QRS duration 74, QTc 476, significant artifact which limits evaluation but no significant ST elevation or depression seen. MDM:    74-year-old female presenting for altered mental status. History and be seen above. Vitals on presentation reassuring and patient afebrile satting on room air. On physical exam lungs are clear to auscultation, cardiac exam is reassuring. Pupils are 3 mm reactive bilaterally. Patient is resisting me opening her eyes. Patient is moving all extremities and pushing me away when I try to evaluate her. Patient's lungs are clear to auscultation, abdomen is soft and nontender, cardiac exam is reassuring. Glucose is reassuring. IV was established. Patient taken immediately over to CT. CT head shows no acute intracranial abnormality. There is mild parenchymal volume loss. CTA shows mild atherosclerotic disease of the left internal carotid artery bulb with approximately 20% stenosis of the left internal carotid artery there is no other significant abnormality of the neck vessels. Due to limited history and patient wincing when I press on her belly CT chest abdomen pelvis obtained showing pleural effusions with trace nonspecific free fluid in the pelvis. There is a 7.2 cm well circumcised fluid collection in the subcutaneous fat adjacent to the left intertrochanteric femur with differential including liquefied hematoma and abscess.   In setting of recent trauma or consistent with hematoma. Stroke neurology [facility] was consulted and evaluated patient, during their evaluation  patient seems to be more responsive. They do not believe patient is a TPA candidate and would like to be called back with LVO is seen on CTA which is not. CBC reveals a white count of 3.3. Hemoglobin is 8.7 near baseline for patient. CMP is reassuring. EKG has significant artifact but no significant ischemic changes. Troponin is 0.069 which is decreased from patient's prior. TSH is within normal limits. VBG is reassuring with a pH of 7.38 and PCO2 of 41. Ammonia is within normal limits. Lactate is 0.7. Rapid flu and rapid Covid obtained. Patient is Covid positive. UA is pending. In discussion with  patient has had similar episodes in the past and has seen neurology and thought to be secondary to patient's Parkinson's. On reevaluation patient seems to be near baseline per . I did discuss goals of care with  and states that patient would not like CPR or intubation. Patient made DNR CCA. Patient will be admitted to the hospital service for further evaluation and treatment. Clinical Impression:  1. Acute encephalopathy    2. COVID-19         Total critical care time provided today was 35 minutes. This excludes seperately billable procedures and family discussion time. Critical care time provided for obtaining history, conducting a physical exam, performing and monitoring interventions, ordering, collecting and interpreting tests, and establishing medical decision-making. There was a potential for life/limb threatening pathology requiring close evaluation and intervention with concern for patient decompensation. Comment: Please note this report has been produced using speech recognition software and may contain errors related to that system including errors in grammar, punctuation, and spelling, as well as words and phrases that may be inappropriate.   Efforts were made to edit the dictations.         Michael Hall MD  12/31/21 5637

## 2021-12-24 NOTE — ED TRIAGE NOTES
Pt presents to ED from [facility] by EMS for decreased LOC and altered mental status.  Pt was seen in ED yesterday for a fall and discharged

## 2021-12-25 ENCOUNTER — APPOINTMENT (OUTPATIENT)
Dept: MRI IMAGING | Age: 86
End: 2021-12-25
Payer: MEDICARE

## 2021-12-25 LAB
AMPHETAMINES: NEGATIVE
ANION GAP SERPL CALCULATED.3IONS-SCNC: 7 MMOL/L (ref 4–16)
BACTERIA: ABNORMAL /HPF
BARBITURATE SCREEN URINE: NEGATIVE
BENZODIAZEPINE SCREEN, URINE: NEGATIVE
BILIRUBIN URINE: NEGATIVE MG/DL
BLOOD, URINE: ABNORMAL
BUN BLDV-MCNC: 21 MG/DL (ref 6–23)
CALCIUM SERPL-MCNC: 8 MG/DL (ref 8.3–10.6)
CANNABINOID SCREEN URINE: NEGATIVE
CHLORIDE BLD-SCNC: 104 MMOL/L (ref 99–110)
CLARITY: ABNORMAL
CO2: 24 MMOL/L (ref 21–32)
COCAINE METABOLITE: NEGATIVE
COLOR: YELLOW
CREAT SERPL-MCNC: 0.9 MG/DL (ref 0.6–1.1)
EKG ATRIAL RATE: 57 BPM
EKG DIAGNOSIS: NORMAL
EKG P-R INTERVAL: 148 MS
EKG Q-T INTERVAL: 490 MS
EKG QRS DURATION: 74 MS
EKG QTC CALCULATION (BAZETT): 476 MS
EKG R AXIS: 13 DEGREES
EKG T AXIS: 28 DEGREES
EKG VENTRICULAR RATE: 57 BPM
GFR AFRICAN AMERICAN: >60 ML/MIN/1.73M2
GFR NON-AFRICAN AMERICAN: 60 ML/MIN/1.73M2
GLUCOSE BLD-MCNC: 72 MG/DL (ref 70–99)
GLUCOSE, URINE: NEGATIVE MG/DL
HCT VFR BLD CALC: 28.4 % (ref 37–47)
HEMOGLOBIN: 8.7 GM/DL (ref 12.5–16)
KETONES, URINE: ABNORMAL MG/DL
LEUKOCYTE ESTERASE, URINE: ABNORMAL
MCH RBC QN AUTO: 32.3 PG (ref 27–31)
MCHC RBC AUTO-ENTMCNC: 30.6 % (ref 32–36)
MCV RBC AUTO: 105.6 FL (ref 78–100)
MUCUS: ABNORMAL HPF
NITRITE URINE, QUANTITATIVE: NEGATIVE
OPIATES, URINE: NEGATIVE
OXYCODONE: NEGATIVE
PDW BLD-RTO: 16.8 % (ref 11.7–14.9)
PH, URINE: 7 (ref 5–8)
PHENCYCLIDINE, URINE: NEGATIVE
PLATELET # BLD: 107 K/CU MM (ref 140–440)
POTASSIUM SERPL-SCNC: 3.8 MMOL/L (ref 3.5–5.1)
PROTEIN UA: NEGATIVE MG/DL
RBC # BLD: 2.69 M/CU MM (ref 4.2–5.4)
RBC URINE: 3 /HPF (ref 0–6)
SODIUM BLD-SCNC: 135 MMOL/L (ref 135–145)
SPECIFIC GRAVITY UA: 1.02 (ref 1–1.03)
SQUAMOUS EPITHELIAL: <1 /HPF
TRICHOMONAS: ABNORMAL /HPF
UROBILINOGEN, URINE: NEGATIVE MG/DL (ref 0.2–1)
WBC # BLD: 3 K/CU MM (ref 4–10.5)
WBC UA: 3 /HPF (ref 0–5)

## 2021-12-25 PROCEDURE — 6370000000 HC RX 637 (ALT 250 FOR IP): Performed by: NURSE PRACTITIONER

## 2021-12-25 PROCEDURE — 87086 URINE CULTURE/COLONY COUNT: CPT

## 2021-12-25 PROCEDURE — 6370000000 HC RX 637 (ALT 250 FOR IP): Performed by: FAMILY MEDICINE

## 2021-12-25 PROCEDURE — 81001 URINALYSIS AUTO W/SCOPE: CPT

## 2021-12-25 PROCEDURE — G0378 HOSPITAL OBSERVATION PER HR: HCPCS

## 2021-12-25 PROCEDURE — 87186 SC STD MICRODIL/AGAR DIL: CPT

## 2021-12-25 PROCEDURE — 80048 BASIC METABOLIC PNL TOTAL CA: CPT

## 2021-12-25 PROCEDURE — 6360000002 HC RX W HCPCS: Performed by: NURSE PRACTITIONER

## 2021-12-25 PROCEDURE — 87077 CULTURE AEROBIC IDENTIFY: CPT

## 2021-12-25 PROCEDURE — 80307 DRUG TEST PRSMV CHEM ANLYZR: CPT

## 2021-12-25 PROCEDURE — 93010 ELECTROCARDIOGRAM REPORT: CPT | Performed by: INTERNAL MEDICINE

## 2021-12-25 PROCEDURE — 94761 N-INVAS EAR/PLS OXIMETRY MLT: CPT

## 2021-12-25 PROCEDURE — 85027 COMPLETE CBC AUTOMATED: CPT

## 2021-12-25 PROCEDURE — 36415 COLL VENOUS BLD VENIPUNCTURE: CPT

## 2021-12-25 PROCEDURE — 2580000003 HC RX 258: Performed by: NURSE PRACTITIONER

## 2021-12-25 PROCEDURE — 96372 THER/PROPH/DIAG INJ SC/IM: CPT

## 2021-12-25 RX ORDER — ACETAMINOPHEN 500 MG
1000 TABLET ORAL 3 TIMES DAILY
COMMUNITY

## 2021-12-25 RX ORDER — TROSPIUM CHLORIDE 20 MG/1
20 TABLET, FILM COATED ORAL
Status: DISCONTINUED | OUTPATIENT
Start: 2021-12-25 | End: 2021-12-29 | Stop reason: HOSPADM

## 2021-12-25 RX ORDER — LATANOPROST 50 UG/ML
1 SOLUTION/ DROPS OPHTHALMIC NIGHTLY
Status: DISCONTINUED | OUTPATIENT
Start: 2021-12-25 | End: 2021-12-29 | Stop reason: HOSPADM

## 2021-12-25 RX ORDER — CEPHALEXIN 500 MG/1
500 CAPSULE ORAL 2 TIMES DAILY
Status: ON HOLD | COMMUNITY
End: 2021-12-29 | Stop reason: HOSPADM

## 2021-12-25 RX ORDER — QUETIAPINE FUMARATE 25 MG/1
25 TABLET, FILM COATED ORAL NIGHTLY
Status: DISCONTINUED | OUTPATIENT
Start: 2021-12-25 | End: 2021-12-29 | Stop reason: HOSPADM

## 2021-12-25 RX ORDER — RIVASTIGMINE 4.6 MG/24H
1 PATCH, EXTENDED RELEASE TRANSDERMAL DAILY
Status: DISCONTINUED | OUTPATIENT
Start: 2021-12-25 | End: 2021-12-29 | Stop reason: HOSPADM

## 2021-12-25 RX ORDER — HYDRALAZINE HYDROCHLORIDE 20 MG/ML
10 INJECTION INTRAMUSCULAR; INTRAVENOUS EVERY 6 HOURS PRN
Status: DISCONTINUED | OUTPATIENT
Start: 2021-12-25 | End: 2021-12-28

## 2021-12-25 RX ORDER — METHOCARBAMOL 500 MG/1
750 TABLET, FILM COATED ORAL 2 TIMES DAILY
Status: DISCONTINUED | OUTPATIENT
Start: 2021-12-25 | End: 2021-12-26

## 2021-12-25 RX ORDER — FUROSEMIDE 20 MG/1
20 TABLET ORAL DAILY
COMMUNITY

## 2021-12-25 RX ADMIN — SODIUM CHLORIDE, PRESERVATIVE FREE 10 ML: 5 INJECTION INTRAVENOUS at 19:59

## 2021-12-25 RX ADMIN — METHOCARBAMOL 750 MG: 500 TABLET ORAL at 12:49

## 2021-12-25 RX ADMIN — SODIUM CHLORIDE, PRESERVATIVE FREE 10 ML: 5 INJECTION INTRAVENOUS at 10:12

## 2021-12-25 RX ADMIN — ACETAMINOPHEN 650 MG: 325 TABLET ORAL at 19:58

## 2021-12-25 RX ADMIN — TROSPIUM CHLORIDE 20 MG: 20 TABLET, FILM COATED ORAL at 16:25

## 2021-12-25 RX ADMIN — CARBIDOPA AND LEVODOPA 1 TABLET: 25; 100 TABLET ORAL at 19:58

## 2021-12-25 RX ADMIN — ENOXAPARIN SODIUM 40 MG: 100 INJECTION SUBCUTANEOUS at 10:12

## 2021-12-25 RX ADMIN — METHOCARBAMOL 750 MG: 500 TABLET ORAL at 19:58

## 2021-12-25 RX ADMIN — CARBIDOPA AND LEVODOPA 1 TABLET: 25; 100 TABLET ORAL at 12:49

## 2021-12-25 RX ADMIN — QUETIAPINE FUMARATE 25 MG: 25 TABLET ORAL at 19:57

## 2021-12-25 ASSESSMENT — PAIN SCALES - GENERAL
PAINLEVEL_OUTOF10: 5
PAINLEVEL_OUTOF10: 0

## 2021-12-25 NOTE — PLAN OF CARE
Problem: Airway Clearance - Ineffective  Goal: Achieve or maintain patent airway  12/25/2021 1555 by Demarcus Chowdhury LPN  Outcome: Ongoing  12/25/2021 0508 by Gwen Jones RN  Outcome: Ongoing     Problem: Gas Exchange - Impaired  Goal: Absence of hypoxia  12/25/2021 1555 by Demarcus Chowdhury LPN  Outcome: Ongoing  12/25/2021 0508 by Gwen Jones RN  Outcome: Ongoing  Goal: Promote optimal lung function  12/25/2021 1555 by Demarcus Chowdhury LPN  Outcome: Ongoing  12/25/2021 0508 by Gwen Jones RN  Outcome: Ongoing     Problem: Breathing Pattern - Ineffective  Goal: Ability to achieve and maintain a regular respiratory rate  12/25/2021 1555 by Demarcus Chowdhury LPN  Outcome: Ongoing  12/25/2021 0508 by Gwen Jones RN  Outcome: Ongoing     Problem:  Body Temperature -  Risk of, Imbalanced  Goal: Ability to maintain a body temperature within defined limits  12/25/2021 1555 by Demarcus Chowdhury LPN  Outcome: Ongoing  12/25/2021 0508 by Gwen Jones RN  Outcome: Ongoing  Goal: Will regain or maintain usual level of consciousness  12/25/2021 1555 by Demarcus Chowdhury LPN  Outcome: Ongoing  12/25/2021 0508 by Gwen Jones RN  Outcome: Ongoing  Goal: Complications related to the disease process, condition or treatment will be avoided or minimized  12/25/2021 1555 by Demarcus Chowdhury LPN  Outcome: Ongoing  12/25/2021 0508 by Gwen Jones RN  Outcome: Ongoing     Problem: Isolation Precautions - Risk of Spread of Infection  Goal: Prevent transmission of infection  12/25/2021 1555 by Demarcus Chowdhury LPN  Outcome: Ongoing  12/25/2021 0508 by Gwen Jones RN  Outcome: Ongoing     Problem: Nutrition Deficits  Goal: Optimize nutritional status  12/25/2021 1555 by Demarcus Chowdhury LPN  Outcome: Ongoing  12/25/2021 0508 by Gwen Jones RN  Outcome: Ongoing     Problem: Risk for Fluid Volume Deficit  Goal: Maintain normal heart rhythm  12/25/2021 1555 by Demarcus Chowdhury LPN  Outcome: Ongoing  12/25/2021 0508 by Gwen Jones RN  Outcome: Ongoing  Goal: Maintain absence of muscle cramping  12/25/2021 1555 by Romina Escamilla LPN  Outcome: Ongoing  12/25/2021 0508 by Jama Robins RN  Outcome: Ongoing  Goal: Maintain normal serum potassium, sodium, calcium, phosphorus, and pH  12/25/2021 1555 by Romina Escamilla LPN  Outcome: Ongoing  12/25/2021 0508 by Jama Robins RN  Outcome: Ongoing     Problem: Loneliness or Risk for Loneliness  Goal: Demonstrate positive use of time alone when socialization is not possible  12/25/2021 1555 by Romina Escamilla LPN  Outcome: Ongoing  12/25/2021 0508 by Jama Robins RN  Outcome: Ongoing     Problem: Fatigue  Goal: Verbalize increase energy and improved vitality  12/25/2021 1555 by Romina Escamilla LPN  Outcome: Ongoing  12/25/2021 0508 by Jama Robins RN  Outcome: Ongoing     Problem: Patient Education: Go to Patient Education Activity  Goal: Patient/Family Education  12/25/2021 1555 by Romina Escamilla LPN  Outcome: Ongoing  12/25/2021 0508 by Jama Robins RN  Outcome: Ongoing     Problem: Skin Integrity:  Goal: Will show no infection signs and symptoms  Description: Will show no infection signs and symptoms  12/25/2021 1555 by Romina Escamilla LPN  Outcome: Ongoing  12/25/2021 0508 by Jama Robins RN  Outcome: Ongoing  Goal: Absence of new skin breakdown  Description: Absence of new skin breakdown  12/25/2021 1555 by Romina Escamilla LPN  Outcome: Ongoing  12/25/2021 0508 by Jama Robins RN  Outcome: Ongoing     Problem: Falls - Risk of:  Goal: Will remain free from falls  Description: Will remain free from falls  12/25/2021 1555 by Romina Escamilla LPN  Outcome: Ongoing  12/25/2021 0508 by Jama Robins RN  Outcome: Ongoing  Goal: Absence of physical injury  Description: Absence of physical injury  12/25/2021 1555 by Romina Escamilla LPN  Outcome: Ongoing  12/25/2021 0508 by Jama Robins RN  Outcome: Ongoing

## 2021-12-25 NOTE — CONSULTS
Neurology Service Consult Note  [facility]  Patient Name: Tamela Dancer  : 1935        Subjective:   Reason for consult: AMS   82 y.o. right-handed female with PMH listed below presenting to [facility] from SNF with complaints of AMS. Patient recently admitted for fall to this hopsital, she was recently discharged one day prior to arrival to the ER, but then returned as SNF felt she was was more altered, COVID-19 swab positive. Patient has hx of Lewy Body disease with delusions and hallucination and is treated at [facility]. Per their notes since  she has become progressively worse. There is not report of seizure. No report of aphasia, dysarthria, unilateal arm or leg weakness. No prior hx of stroke noted. No family at the bedside. No fever. Hemodynamically stable. Past Medical History:   Diagnosis Date    Colonoscopy refused     Glaucoma     Dr. Leah Templeton Hyperlipidemia     Hypertension     Major depressive disorder with single episode 2018    Patient has depression and agitation. Was started on the Lexapro.  Myelodysplastic syndrome ([address] 10/22/2018    Dx 10/2018 : Myelodysplastic syndrome with multilineage dysplasia Dr Lazcano Leo    Obesity     Status post total left knee replacement 2017    Pt had  Left TKA on 3/2017 in [place] Dr Aggie Hammond. Antibiotic prophylaxis recommended by ortho when needed.     Thrombocytopenia ([address] 2018    Patient is referred to hematologist    :   Past Surgical History:   Procedure Laterality Date    CATARACT REMOVAL Bilateral     CHOLECYSTECTOMY      HYSTERECTOMY Bilateral     KNEE ARTHROSCOPY Left     TOTAL KNEE ARTHROPLASTY Left 03/10/2017     Medications:  Scheduled Meds:   sodium chloride  1,000 mL IntraVENous Once    sodium chloride flush  5-40 mL IntraVENous 2 times per day    enoxaparin  40 mg SubCUTAneous Daily     Continuous Infusions:   sodium chloride       PRN Meds:.sodium chloride flush, sodium chloride, polyethylene glycol, nicotine polacrilex, acetaminophen **OR** acetaminophen, ondansetron    No Known Allergies  Social History     Socioeconomic History    Marital status:      Spouse name: Not on file    Number of children: Not on file    Years of education: Not on file    Highest education level: Not on file   Occupational History    Not on file   Tobacco Use    Smoking status: Never Smoker    Smokeless tobacco: Never Used   Vaping Use    Vaping Use: Never used   Substance and Sexual Activity    Alcohol use: No     Alcohol/week: 0.0 standard drinks    Drug use: No    Sexual activity: Yes     Partners: Male   Other Topics Concern    Not on file   Social History Narrative    Not on file     Social Determinants of Health     Financial Resource Strain: Medium Risk    Difficulty of Paying Living Expenses: Somewhat hard   Food Insecurity: No Food Insecurity    Worried About Running Out of Food in the Last Year: Never true    Liya of Food in the Last Year: Never true   Transportation Needs: No Transportation Needs    Lack of Transportation (Medical): No    Lack of Transportation (Non-Medical):  No   Physical Activity:     Days of Exercise per Week: Not on file    Minutes of Exercise per Session: Not on file   Stress:     Feeling of Stress : Not on file   Social Connections:     Frequency of Communication with Friends and Family: Not on file    Frequency of Social Gatherings with Friends and Family: Not on file    Attends Gnosticism Services: Not on file    Active Member of Clubs or Organizations: Not on file    Attends Club or Organization Meetings: Not on file    Marital Status: Not on file   Intimate Partner Violence:     Fear of Current or Ex-Partner: Not on file    Emotionally Abused: Not on file    Physically Abused: Not on file    Sexually Abused: Not on file   Housing Stability:     Unable to Pay for Housing in the Last Year: Not on file    Number of [place] in the ASSESSMENT/PLAN:     3 82year old female with acute AMS-secondary to metabolic encephalopathy with acute COVID-19 virus superimposed on hx of Lewy Body Disease. Patient is rigid on exam, she has not received her home sinemet or dementia medications, we need to consider using NG tube to get her her daily medications to aid in the worsening of her baseline neurological symptoms. Neuro exam shows rigidity, disorientation, but non focal. CT head is non acute. No further studies recommended at this time, suspect when she gets her home medications and has her acute viral illness improves her mentation will improve. Thank you for allowing us to participate in the care of your patient. If there are any questions regarding evaluation please feel free to contact us. COURTNEY Brooke CNP, 12/25/2021    Attending Note:  I have rounded on this patient with RADHA Villanueva. I have reviewed the chart and we have discussed this case in detail. The patient was seen and examined by myself. Pertinent labs and imaging have been personally reviewed. Changes were made to the note as appropriate. I concur with the above assessment and plan.     Gian Sanabria DO 12/25/21   Neurology

## 2021-12-26 PROBLEM — G20.A1 PARKINSON DISEASE: Status: ACTIVE | Noted: 2020-06-22

## 2021-12-26 LAB
ANION GAP SERPL CALCULATED.3IONS-SCNC: 8 MMOL/L (ref 4–16)
BUN BLDV-MCNC: 18 MG/DL (ref 6–23)
CALCIUM SERPL-MCNC: 7.9 MG/DL (ref 8.3–10.6)
CHLORIDE BLD-SCNC: 109 MMOL/L (ref 99–110)
CO2: 24 MMOL/L (ref 21–32)
CREAT SERPL-MCNC: 1.1 MG/DL (ref 0.6–1.1)
D DIMER: 519 NG/ML(DDU)
GFR AFRICAN AMERICAN: 57 ML/MIN/1.73M2
GFR NON-AFRICAN AMERICAN: 47 ML/MIN/1.73M2
GLUCOSE BLD-MCNC: 79 MG/DL (ref 70–99)
HIGH SENSITIVE C-REACTIVE PROTEIN: 14.1 MG/L
POTASSIUM SERPL-SCNC: 3.9 MMOL/L (ref 3.5–5.1)
PRO-BNP: 8889 PG/ML
SODIUM BLD-SCNC: 141 MMOL/L (ref 135–145)

## 2021-12-26 PROCEDURE — 85379 FIBRIN DEGRADATION QUANT: CPT

## 2021-12-26 PROCEDURE — 96367 TX/PROPH/DG ADDL SEQ IV INF: CPT

## 2021-12-26 PROCEDURE — G0378 HOSPITAL OBSERVATION PER HR: HCPCS

## 2021-12-26 PROCEDURE — 96366 THER/PROPH/DIAG IV INF ADDON: CPT

## 2021-12-26 PROCEDURE — 6370000000 HC RX 637 (ALT 250 FOR IP): Performed by: NURSE PRACTITIONER

## 2021-12-26 PROCEDURE — 2580000003 HC RX 258: Performed by: NURSE PRACTITIONER

## 2021-12-26 PROCEDURE — 87040 BLOOD CULTURE FOR BACTERIA: CPT

## 2021-12-26 PROCEDURE — 6360000002 HC RX W HCPCS: Performed by: FAMILY MEDICINE

## 2021-12-26 PROCEDURE — 96372 THER/PROPH/DIAG INJ SC/IM: CPT

## 2021-12-26 PROCEDURE — 86141 C-REACTIVE PROTEIN HS: CPT

## 2021-12-26 PROCEDURE — 80048 BASIC METABOLIC PNL TOTAL CA: CPT

## 2021-12-26 PROCEDURE — 83880 ASSAY OF NATRIURETIC PEPTIDE: CPT

## 2021-12-26 PROCEDURE — 2580000003 HC RX 258: Performed by: FAMILY MEDICINE

## 2021-12-26 PROCEDURE — 6370000000 HC RX 637 (ALT 250 FOR IP): Performed by: FAMILY MEDICINE

## 2021-12-26 PROCEDURE — 36415 COLL VENOUS BLD VENIPUNCTURE: CPT

## 2021-12-26 PROCEDURE — 99223 1ST HOSP IP/OBS HIGH 75: CPT | Performed by: STUDENT IN AN ORGANIZED HEALTH CARE EDUCATION/TRAINING PROGRAM

## 2021-12-26 PROCEDURE — 96365 THER/PROPH/DIAG IV INF INIT: CPT

## 2021-12-26 PROCEDURE — 94761 N-INVAS EAR/PLS OXIMETRY MLT: CPT

## 2021-12-26 PROCEDURE — 6360000002 HC RX W HCPCS: Performed by: NURSE PRACTITIONER

## 2021-12-26 RX ADMIN — TROSPIUM CHLORIDE 20 MG: 20 TABLET, FILM COATED ORAL at 15:28

## 2021-12-26 RX ADMIN — CEFTRIAXONE SODIUM 1000 MG: 1 INJECTION, POWDER, FOR SOLUTION INTRAMUSCULAR; INTRAVENOUS at 14:47

## 2021-12-26 RX ADMIN — CARBIDOPA AND LEVODOPA 1 TABLET: 25; 100 TABLET ORAL at 15:28

## 2021-12-26 RX ADMIN — SODIUM CHLORIDE, PRESERVATIVE FREE 10 ML: 5 INJECTION INTRAVENOUS at 19:34

## 2021-12-26 RX ADMIN — SODIUM CHLORIDE, PRESERVATIVE FREE 10 ML: 5 INJECTION INTRAVENOUS at 12:13

## 2021-12-26 RX ADMIN — CARBIDOPA AND LEVODOPA 1 TABLET: 25; 100 TABLET ORAL at 19:01

## 2021-12-26 RX ADMIN — QUETIAPINE FUMARATE 25 MG: 25 TABLET ORAL at 19:01

## 2021-12-26 RX ADMIN — ACETAMINOPHEN 650 MG: 325 TABLET ORAL at 19:00

## 2021-12-26 RX ADMIN — LATANOPROST 1 DROP: 50 SOLUTION OPHTHALMIC at 01:33

## 2021-12-26 RX ADMIN — VANCOMYCIN HYDROCHLORIDE 1000 MG: 1 INJECTION, POWDER, LYOPHILIZED, FOR SOLUTION INTRAVENOUS at 19:21

## 2021-12-26 RX ADMIN — ENOXAPARIN SODIUM 40 MG: 100 INJECTION SUBCUTANEOUS at 12:12

## 2021-12-26 RX ADMIN — TROSPIUM CHLORIDE 20 MG: 20 TABLET, FILM COATED ORAL at 06:28

## 2021-12-26 RX ADMIN — PIPERACILLIN SODIUM AND TAZOBACTAM SODIUM 3375 MG: 3; .375 INJECTION, POWDER, LYOPHILIZED, FOR SOLUTION INTRAVENOUS at 15:13

## 2021-12-26 ASSESSMENT — PAIN SCALES - GENERAL
PAINLEVEL_OUTOF10: 0
PAINLEVEL_OUTOF10: 5

## 2021-12-26 NOTE — PROGRESS NOTES
Hospitalist Progress Note      Name:  Shantelle Brenner /Age/Sex: 1935  (82 y.o. female)   MRN & CSN:  [id number] & [id number] Admission Date/Time: 2021 12:24 PM   Location:  3001/3001-B PCP: Lita Murray MD         Hospital Day: 3    Assessment and Plan:   Shantelle Brenner is a 82 y.o.  female  who presents with acute mental status changes     1. Encephalopathy        Hx of parkinson's and dementia   1. CT head () no acute intracranial abnormality   2. CXR () no acute injury   3. CTA head neck w/wo () no acute intracranial abnormality, mild parenchymal loss, mild chronic microvascular disease. Mild atherosclerotic disease in the region of the left internal carotid artery bulb with approximately 20% stenosis of the left internal carotid artery. 4. Consult speech~rec's appreciated  5. Consult neurology~continue pt's home medications, no further imaging needed       2.    covid 19          1. Ct chest ( shows pleural effusions and proximal posteriomedial lower lobe airway filling focally         2. Rapid test positive on         3. WBC normal        4. Lactate normal         5. On room air currently     3. Essential hypertension          1. Not on any home medications          2. Hydralazine prn Q 6 hours with parameters     4. Hyperlipidemia         1. Not on a statin       Diet ADULT DIET; Dysphagia - Soft and Bite Sized   DVT Prophylaxis [x] Lovenox, []  Heparin, [] SCDs, [] Ambulation   GI Prophylaxis [] PPI,  [] H2 Blocker,  [] Carafate,  [x] Diet/Tube Feeds   Code Status DNR-CCA   Disposition Patient requires continued admission due to confusion         History of Present Illness:     Chief Complaint: encephalopathy   Shantelle Brenner is a 82 y.o.  female  who presents with confusion above her baseline. Pt is not responding to questions being asked and does not make sense.  Per the roommate the patient did not rest very much during the night time and has been very loud. Ten point ROS reviewed negative, unless as noted above    Objective: Intake/Output Summary (Last 24 hours) at 12/26/2021 1049  Last data filed at 12/26/2021 0655  Gross per 24 hour   Intake --   Output 750 ml   Net -750 ml      Vitals:   Vitals:    12/26/21 0637   BP: (!) 181/85   Pulse: 78   Resp: 18   Temp: 98.7 °F (37.1 °C)   SpO2: 98%     Physical Exam:   GEN Awake female, sitting upright in bed in no apparent distress. Appears given age. EYES Pupils are equally round. No scleral erythema, discharge, or conjunctivitis. HENT Mucous membranes are moist. Oral pharynx without exudates, no evidence of thrush. NECK Supple, no apparent thyromegaly or masses. RESP Diminished to auscultation, no wheezes, rales or rhonchi. Symmetric chest movement while on room air. CARDIO/VASC S1/S2 auscultated. Regular rate without appreciable murmurs, rubs, or gallops. No JVD or carotid bruits. Peripheral pulses equal bilaterally and palpable. No peripheral edema. GI Abdomen is soft without significant tenderness, masses, or guarding. Bowel sounds are normoactive. Rectal exam deferred.  No costovertebral angle tenderness. Roper catheter is not present. HEME/LYMPH No palpable cervical lymphadenopathy and no hepatosplenomegaly. No petechiae or ecchymoses. MSK No gross joint deformities. SKIN Normal coloration, warm, dry. NEURO Cranial nerves appear grossly intact, normal speech, no lateralizing weakness.   PSYCH Unable to assess     Medications:   Medications:    carbidopa-levodopa  1 tablet Oral TID    latanoprost  1 drop Both Eyes Nightly    linaclotide  290 mcg Oral QAM AC    QUEtiapine  25 mg Oral Nightly    rivastigmine  1 patch TransDERmal Daily    trospium  20 mg Oral BID AC    methocarbamol  750 mg Oral BID    sodium chloride  1,000 mL IntraVENous Once    sodium chloride flush  5-40 mL IntraVENous 2 times per day    enoxaparin  40 mg SubCUTAneous Daily      Infusions:  sodium chloride       PRN Meds: hydrALAZINE, 10 mg, Q6H PRN  sodium chloride flush, 10 mL, PRN  sodium chloride, 25 mL, PRN  polyethylene glycol, 17 g, Daily PRN  nicotine polacrilex, 2 mg, Q1H PRN  acetaminophen, 650 mg, Q6H PRN   Or  acetaminophen, 650 mg, Q6H PRN  ondansetron, 4 mg, Q6H PRN          Patient is still admitted because continuing work up for encephalopathy . The anticipated discharge is in greater than 24 hours.      Electronically signed by COURTNEY Blevins CNP on 12/26/2021 at 10:49 AM

## 2021-12-26 NOTE — PROGRESS NOTES
Hospitalist Progress Note      Name:  Shantelle Brenner /Age/Sex: 1935  (82 y.o. female)   MRN & CSN:  [id number] & [id number] Admission Date/Time: 2021 12:24 PM   Location:  3001/3001-B PCP: Lita Murray MD         Hospital Day: 3    Assessment and Plan:   Shantelle Brenner is a 82 y.o.  female  who presents with acute mental status changes     1. Encephalopathy        Hx of parkinson's and dementia   1. CT head () no acute intracranial abnormality   2. CXR () no acute injury   3. CTA head neck w/wo () no acute intracranial abnormality, mild parenchymal loss, mild chronic microvascular disease. Mild atherosclerotic disease in the region of the left internal carotid artery bulb with approximately 20% stenosis of the left internal carotid artery. 4. Consult speech~rec's appreciated  5. Consult neurology~continue pt's home medications, no further imaging needed     6. Ua (1225) shows ketones, blood, large leuks, occasional bacteria, and rare mucus  7. Urine culture pending~blood cultures pending  8. Blood CX 2 of 4 positive for staph spp  9. Consult Infectious disease~rec's appreciated     2.    covid 19          1. Ct chest ( shows pleural effusions and proximal posteriomedial lower lobe airway filling focally         2. Rapid test positive on         3. WBC normal        4. Lactate normal         5. On room air currently     3. Essential hypertension          1. Not on any home medications          2. Hydralazine prn Q 6 hours with parameters     4. Hyperlipidemia         1. Not on a statin       Diet ADULT DIET;  Dysphagia - Soft and Bite Sized   DVT Prophylaxis [x] Lovenox, []  Heparin, [] SCDs, [] Ambulation   GI Prophylaxis [] PPI,  [] H2 Blocker,  [] Carafate,  [x] Diet/Tube Feeds   Code Status DNR-CCA   Disposition Patient requires continued admission due to confusion         History of Present Illness:     Chief Complaint: encephalopathy   Tucker Magana Rocco Yin is a 82 y.o.  female  who presents with confusion above her baseline. Pt is resting quietly in the room. nad noted. Pt has 2 positive blood cultures plus a uti. Urine culture is pending. Infectious disease has been consulted. Starting Vancomycin and Zosyn for now. Ten point ROS reviewed negative, unless as noted above    Objective: Intake/Output Summary (Last 24 hours) at 12/26/2021 1108  Last data filed at 12/26/2021 0655  Gross per 24 hour   Intake --   Output 750 ml   Net -750 ml      Vitals:   Vitals:    12/26/21 0637   BP: (!) 181/85   Pulse: 78   Resp: 18   Temp: 98.7 °F (37.1 °C)   SpO2: 98%     Physical Exam:   GEN Awake female, sitting upright in bed in no apparent distress. Appears given age. EYES Pupils are equally round. No scleral erythema, discharge, or conjunctivitis. HENT Mucous membranes are moist. Oral pharynx without exudates, no evidence of thrush. NECK Supple, no apparent thyromegaly or masses. RESP Diminished to auscultation, no wheezes, rales or rhonchi. Symmetric chest movement while on room air. CARDIO/VASC S1/S2 auscultated. Regular rate without appreciable murmurs, rubs, or gallops. No JVD or carotid bruits. Peripheral pulses equal bilaterally and palpable. No peripheral edema. GI Abdomen is soft without significant tenderness, masses, or guarding. Bowel sounds are normoactive. Rectal exam deferred.  No costovertebral angle tenderness. Roper catheter is not present. HEME/LYMPH No palpable cervical lymphadenopathy and no hepatosplenomegaly. No petechiae or ecchymoses. MSK No gross joint deformities. SKIN Normal coloration, warm, dry. NEURO Cranial nerves appear grossly intact, normal speech, no lateralizing weakness.   PSYCH Unable to assess     Medications:   Medications:    carbidopa-levodopa  1 tablet Oral TID    latanoprost  1 drop Both Eyes Nightly    linaclotide  290 mcg Oral QAM AC    QUEtiapine  25 mg Oral Nightly    rivastigmine  1 patch TransDERmal Daily    trospium  20 mg Oral BID AC    methocarbamol  750 mg Oral BID    sodium chloride  1,000 mL IntraVENous Once    sodium chloride flush  5-40 mL IntraVENous 2 times per day    enoxaparin  40 mg SubCUTAneous Daily      Infusions:    sodium chloride       PRN Meds: hydrALAZINE, 10 mg, Q6H PRN  sodium chloride flush, 10 mL, PRN  sodium chloride, 25 mL, PRN  polyethylene glycol, 17 g, Daily PRN  nicotine polacrilex, 2 mg, Q1H PRN  acetaminophen, 650 mg, Q6H PRN   Or  acetaminophen, 650 mg, Q6H PRN  ondansetron, 4 mg, Q6H PRN          Patient is still admitted because continuing work up for encephalopathy . The anticipated discharge is in greater than 24 hours.      Electronically signed by COURTNEY Marroquin CNP on 12/26/2021 at 11:08 AM

## 2021-12-26 NOTE — PROGRESS NOTES
[facility]  consulted by Kenna Bonner CNP for monitoring and adjustment. Indication for treatment: Bloodstream infection  Goal trough: 15 mcg/mL  AUC:  400 - 600    Pertinent Laboratory Values:   Temp Readings from Last 3 Encounters:   12/26/21 97.6 °F (36.4 °C) (Axillary)   12/23/21 98.4 °F (36.9 °C) (Oral)   11/22/21 97.4 °F (36.3 °C) (Temporal)     Recent Labs     12/24/21  1240 12/25/21  0431   WBC 3.3* 3.0*     Recent Labs     12/24/21  1240 12/25/21  0431 12/26/21  0640   BUN 25* 21 18   CREATININE 1.0 0.9 1.1     Estimated Creatinine Clearance: 37 mL/min (based on SCr of 1.1 mg/dL). Intake/Output Summary (Last 24 hours) at 12/26/2021 1226  Last data filed at 12/26/2021 2431  Gross per 24 hour   Intake --   Output 750 ml   Net -750 ml       Pertinent Cultures:  Date    Source    Results  12/26   Urine     Ordered              Vancomycin level:   TROUGH:  No results for input(s): VANCOTROUGH in the last 72 hours. RANDOM:  No results for input(s): VANCORANDOM in the last 72 hours. Assessment:  WBC and temperature: WBC low @ 3.0; patient is afebrile   SCr, BUN, and urine output: WNL  Day(s) of therapy: # 1  Vancomycin concentration: to be collected     Plan:  Started on Vancomycin 1000mg IVPB every 24 hours  Predicted AUC = 475  Predicted Trough = 14.9  Random level in 2 days   Pharmacy will continue to monitor patient and adjust therapy as indicated    [address] 12/28 @ 0600    Thank you for the consult.   Jenny Castro, [facility]  12/26/2021 12:26 PM

## 2021-12-27 LAB
ANION GAP SERPL CALCULATED.3IONS-SCNC: 9 MMOL/L (ref 4–16)
BUN BLDV-MCNC: 15 MG/DL (ref 6–23)
CALCIUM SERPL-MCNC: 7.4 MG/DL (ref 8.3–10.6)
CHLORIDE BLD-SCNC: 107 MMOL/L (ref 99–110)
CO2: 24 MMOL/L (ref 21–32)
CREAT SERPL-MCNC: 1 MG/DL (ref 0.6–1.1)
GFR AFRICAN AMERICAN: >60 ML/MIN/1.73M2
GFR NON-AFRICAN AMERICAN: 53 ML/MIN/1.73M2
GLUCOSE BLD-MCNC: 73 MG/DL (ref 70–99)
POTASSIUM SERPL-SCNC: 3.8 MMOL/L (ref 3.5–5.1)
SODIUM BLD-SCNC: 140 MMOL/L (ref 135–145)

## 2021-12-27 PROCEDURE — 96366 THER/PROPH/DIAG IV INF ADDON: CPT

## 2021-12-27 PROCEDURE — 2580000003 HC RX 258: Performed by: FAMILY MEDICINE

## 2021-12-27 PROCEDURE — G0378 HOSPITAL OBSERVATION PER HR: HCPCS

## 2021-12-27 PROCEDURE — 6370000000 HC RX 637 (ALT 250 FOR IP): Performed by: FAMILY MEDICINE

## 2021-12-27 PROCEDURE — 6360000002 HC RX W HCPCS: Performed by: FAMILY MEDICINE

## 2021-12-27 PROCEDURE — 2580000003 HC RX 258: Performed by: NURSE PRACTITIONER

## 2021-12-27 PROCEDURE — 36415 COLL VENOUS BLD VENIPUNCTURE: CPT

## 2021-12-27 PROCEDURE — 6360000002 HC RX W HCPCS: Performed by: NURSE PRACTITIONER

## 2021-12-27 PROCEDURE — 92610 EVALUATE SWALLOWING FUNCTION: CPT

## 2021-12-27 PROCEDURE — 99231 SBSQ HOSP IP/OBS SF/LOW 25: CPT

## 2021-12-27 PROCEDURE — 96372 THER/PROPH/DIAG INJ SC/IM: CPT

## 2021-12-27 PROCEDURE — 80048 BASIC METABOLIC PNL TOTAL CA: CPT

## 2021-12-27 RX ORDER — LOSARTAN POTASSIUM 25 MG/1
25 TABLET ORAL EVERY EVENING
Status: DISCONTINUED | OUTPATIENT
Start: 2021-12-27 | End: 2021-12-29 | Stop reason: HOSPADM

## 2021-12-27 RX ADMIN — CARBIDOPA AND LEVODOPA 1 TABLET: 25; 100 TABLET ORAL at 09:47

## 2021-12-27 RX ADMIN — TROSPIUM CHLORIDE 20 MG: 20 TABLET, FILM COATED ORAL at 15:41

## 2021-12-27 RX ADMIN — PIPERACILLIN SODIUM AND TAZOBACTAM SODIUM 3375 MG: 3; .375 INJECTION, POWDER, LYOPHILIZED, FOR SOLUTION INTRAVENOUS at 00:05

## 2021-12-27 RX ADMIN — LATANOPROST 1 DROP: 50 SOLUTION OPHTHALMIC at 20:42

## 2021-12-27 RX ADMIN — CARBIDOPA AND LEVODOPA 1 TABLET: 25; 100 TABLET ORAL at 15:41

## 2021-12-27 RX ADMIN — VANCOMYCIN HYDROCHLORIDE 1000 MG: 1 INJECTION, POWDER, LYOPHILIZED, FOR SOLUTION INTRAVENOUS at 16:55

## 2021-12-27 RX ADMIN — TROSPIUM CHLORIDE 20 MG: 20 TABLET, FILM COATED ORAL at 09:48

## 2021-12-27 RX ADMIN — SODIUM CHLORIDE, PRESERVATIVE FREE 10 ML: 5 INJECTION INTRAVENOUS at 10:43

## 2021-12-27 RX ADMIN — CARBIDOPA AND LEVODOPA 1 TABLET: 25; 100 TABLET ORAL at 20:42

## 2021-12-27 RX ADMIN — SODIUM CHLORIDE, PRESERVATIVE FREE 10 ML: 5 INJECTION INTRAVENOUS at 20:43

## 2021-12-27 RX ADMIN — LOSARTAN POTASSIUM 25 MG: 25 TABLET, FILM COATED ORAL at 17:05

## 2021-12-27 RX ADMIN — PIPERACILLIN SODIUM AND TAZOBACTAM SODIUM 3375 MG: 3; .375 INJECTION, POWDER, LYOPHILIZED, FOR SOLUTION INTRAVENOUS at 12:39

## 2021-12-27 RX ADMIN — CEFTRIAXONE SODIUM 1000 MG: 1 INJECTION, POWDER, FOR SOLUTION INTRAMUSCULAR; INTRAVENOUS at 15:54

## 2021-12-27 RX ADMIN — QUETIAPINE FUMARATE 25 MG: 25 TABLET ORAL at 20:42

## 2021-12-27 RX ADMIN — ENOXAPARIN SODIUM 40 MG: 100 INJECTION SUBCUTANEOUS at 09:49

## 2021-12-27 ASSESSMENT — PAIN SCALES - GENERAL
PAINLEVEL_OUTOF10: 0
PAINLEVEL_OUTOF10: 0

## 2021-12-27 NOTE — CONSULTS
Occupational Therapy Treatment Note  Per chart review, pt is DEP at baseline and admits from [address] [place] [address]. Upon entry into the room, RN noted to be assisting pt in DEP feeding. Appears to have no skilled therapy needs at this time. Order will be discontinued. Re-order if needed.

## 2021-12-27 NOTE — DISCHARGE INSTR - COC
Continuity of Care Form    Patient Name: Ricki Kinsey   :  1935  MRN:  [id number]    Admit date:  2021  Discharge date:  t    Code Status Order: DNR-CCA   Advance Directives:      Admitting Physician:  Martha Alonzo MD  PCP: Leticia Strange MD    Discharging Nurse: [facility] Unit/Room#: 3001/3001-B  Discharging Unit Phone Number: ***    Emergency Contact:   Extended Emergency Contact Information  Primary Emergency Contact: Arturo Santos  [facility] [address] [place] [address] Phone: [phone]  Mobile Phone: [phone]  Relation: Spouse  Secondary Emergency Contact: Danielle chiara  Home Phone: [phone]  Mobile Phone: [phone]  Relation: Child  Preferred language: English   needed?  No    Past Surgical History:  Past Surgical History:   Procedure Laterality Date    CATARACT REMOVAL Bilateral     CHOLECYSTECTOMY      HYSTERECTOMY Bilateral     KNEE ARTHROSCOPY Left     TOTAL KNEE ARTHROPLASTY Left 03/10/2017       Immunization History:   Immunization History   Administered Date(s) Administered    Influenza Virus Vaccine 10/29/2014, 2015, 2019    Influenza, High Dose (Fluzone 65 yrs and older) 10/13/2016, 2017, 10/22/2018    Influenza, Quadv, adjuvanted, 65 yrs +, IM, PF (Fluad) 2020    Influenza, Triv, inactivated, subunit, adjuvanted, IM (Fluad 65 yrs and older) 10/17/2019    Pneumococcal Conjugate 13-valent (Oijnmhu49) 2015    Pneumococcal Polysaccharide (Kruydgwzk74) 2004       Active Problems:  Patient Active Problem List   Diagnosis Code    Essential hypertension I10    Mixed hyperlipidemia E78.2    Glaucoma of both eyes H40.9    Major depressive disorder with single episode F32.9    Weight loss R63.4    Thrombocytopenia (HCC) D69.6    Bilateral low back pain with left-sided sciatica M54.42    Myelodysplastic syndrome (HCC) D46.9    Renal insufficiency N28.9    Hallucination R44.3    Parkinson disease ([facility] G20    Other megaloblastic anemias, 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    Other fatigue R53.83    Dementia with behavioral disturbance (HCC) F03.91    Other constipation K59.09    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3 °C) (Oral)   Resp 18   Wt 150 lb 12.7 oz (68.4 kg)   SpO2 95%   BMI 23.62 kg/m²     Last documented pain score (0-10 scale): Pain Level: 0  Last Weight:   Wt Readings from Last 1 Encounters:   12/26/21 150 lb 12.7 oz (68.4 kg)     Mental Status:  disoriented and alert    IV Access:  - None    Nursing Mobility/ADLs:  Walking   Dependent  Transfer  Dependent  Bathing  Dependent  Dressing  Dependent  Toileting  Dependent  Feeding  Assisted  Med Admin  Dependent  Med Delivery   crushed    Wound Care Documentation and Therapy:  Wound 12/25/21 Pretibial Left abbraision (Active)   Number of days: 2        Elimination:  Continence: Bowel: No  Bladder: No  Urinary Catheter: None   Colostomy/Ileostomy/Ileal Conduit: No       Date of Last BM: ***  No intake or output data in the 24 hours ending 12/27/21 1338  No intake/output data recorded.     Safety Concerns:     History of Falls (last 30 days), At Risk for Falls, and Aspiration Risk    Impairments/Disabilities:      None      Patient's personal belongings (please select all that are sent with patient):  None    RN SIGNATURE:  Electronically signed by Guillermo Bowen RN on 12/29/21 at 7:43 AM EST      PHYSICIAN SECTION    Nutrition Therapy:  Current Nutrition Therapy:   - Oral Diet:  General    Routes of Feeding: Oral  Liquids: Thin Liquids  Daily Fluid Restriction: no  Last Modified Barium Swallow with Video (Video Swallowing Test): not done    Treatments at the Time of Hospital Discharge:   Respiratory Treatments:   Oxygen Therapy:  is not on home oxygen therapy. Ventilator:    - No ventilator support    Rehab Therapies: Physical Therapy and Occupational Therapy  Weight Bearing Status/Restrictions: No weight bearing restirctions  Other Medical Equipment (for information only, NOT a DME order):  wheelchair  Other Treatments:     Prognosis: Fair    Condition at Discharge: Stable    Rehab Potential (if transferring to Rehab): Fair    Recommended Labs or Other Treatments After Discharge: monitor BP. Was started on amlodipine and Losartan. Regimen may need to be adjusted     Physician Certification: I certify the above information and transfer of Kaylyn Bright  is necessary for the continuing treatment of the diagnosis listed and that she requires [facility] for less 30 days.      Update Admission H&P: No change in H&P    PHYSICIAN SIGNATURE:  Electronically signed by COURTNEY Hanna CNP on 12/29/21 at 9:10 AM EST

## 2021-12-27 NOTE — CARE COORDINATION
LSW received a call from Milagro Infante with [facility] and pt can return to [facility] once she is medically stable. Pt plans to return. CM will need a ANA MARIA completed by RN and doctor at discharge. If pt is discharged after hours please complete the following. ... Call report to [phone]   Fax completed AVS with both ANA MARIA on the AVS and any written Rx [phone]. Set up transportation (pt's choice) [phone]. Med Trans [phone] or [phone] and call family.

## 2021-12-27 NOTE — CARE COORDINATION
LSW spoke with Mark Vela with [facility]. Mark Vela stated that pt is LTC at . She is checking with lyric to make sure pt can return without a precert. Pt needs PT/OT WB has been placed by weekend CM.

## 2021-12-27 NOTE — PLAN OF CARE
Per the physical rehabilitation triage process, the PT referral will be held at this time. Discussed with OT. Patient dependent mobility at baseline. From LTC. No acute care physical therapy skilled needs identified at this time. Gary Arroyo, PT,   12/27/2021, 10:27 AM  Reviewed LSW updated notes, removed PT consult -- not necessary consult.   Gary Arroyo, [facility] [place][note 2][address] [address]  9:26 AM 12/28/2021

## 2021-12-27 NOTE — PROGRESS NOTES
Hospitalist Progress Note      Name:  Nathalia Todd /Age/Sex: 1935  (82 y.o. female)   MRN & CSN:  [id number] & [id number] Admission Date/Time: 2021 12:24 PM   Location:  3001/3001-B PCP: Thom Murphy MD         Hospital Day: 4    Assessment and Plan:   Nathalia Todd is a 82 y.o.  female  who presents with acute mental status changes     1. Encephalopathy        Hx of parkinson's and dementia   1. CT head () no acute intracranial abnormality   2. CXR () no acute injury   3. CTA head neck w/wo () no acute intracranial abnormality, mild parenchymal loss, mild chronic microvascular disease. Mild atherosclerotic disease in the region of the left internal carotid artery bulb with approximately 20% stenosis of the left internal carotid artery. 4. Consult speech~rec's appreciated  5. Consult neurology~continue pt's home medications, no further imaging needed     6. Ua (1225) shows ketones, blood, large leuks, occasional bacteria, and rare mucus  7. Urine culture pending~blood cultures pending  8. Blood CX 2 of 4 positive for staph spp (contamination)  9. Repeat blood cx ordered   10. Vancomycin and Rocephin ordered  11. Consult Infectious disease~rec's appreciated     2.    covid 19          1. Ct chest ( shows pleural effusions and proximal posteriomedial lower lobe airway filling focally         2. Rapid test positive on         3. WBC normal        4. Lactate normal         5. On room air currently         6. CRP () 14.1, D dimer () 519, lactate () 0.7    3. Essential hypertension          1. Not on any home medications          2. Hydralazine prn Q 6 hours with parameters     4. Hyperlipidemia         1. Not on a statin       Diet ADULT DIET;  Dysphagia - Soft and Bite Sized   DVT Prophylaxis [x] Lovenox, []  Heparin, [] SCDs, [] Ambulation   GI Prophylaxis [] PPI,  [] H2 Blocker,  [] Carafate,  [x] Diet/Tube Feeds   Code Status DNR-CCA Disposition Patient requires continued admission due to confusion         History of Present Illness:     Chief Complaint: encephalopathy   Chapo Cabrera is a 82 y.o.  female  who presents with confusion above her baseline. Pt is resting quietly in the room. nad noted. Will continue to monitor      Ten point ROS reviewed negative, unless as noted above    Objective:     No intake or output data in the 24 hours ending 12/27/21 1009   Vitals:   Vitals:    12/27/21 0239   BP: (!) 144/81   Pulse: 77   Resp: 17   Temp: 99.2 °F (37.3 °C)   SpO2: 94%     Physical Exam:   GEN Awake female, sitting upright in bed in no apparent distress. Appears given age. EYES Pupils are equally round. No scleral erythema, discharge, or conjunctivitis. HENT Mucous membranes are moist. Oral pharynx without exudates, no evidence of thrush. NECK Supple, no apparent thyromegaly or masses. RESP Diminished to auscultation, no wheezes, rales or rhonchi. Symmetric chest movement while on room air. CARDIO/VASC S1/S2 auscultated. Regular rate without appreciable murmurs, rubs, or gallops. No JVD or carotid bruits. Peripheral pulses equal bilaterally and palpable. No peripheral edema. GI Abdomen is soft without significant tenderness, masses, or guarding. Bowel sounds are normoactive. Rectal exam deferred.  No costovertebral angle tenderness. Roper catheter is not present. HEME/LYMPH No palpable cervical lymphadenopathy and no hepatosplenomegaly. No petechiae or ecchymoses. MSK No gross joint deformities. SKIN Normal coloration, warm, dry. NEURO Cranial nerves appear grossly intact, normal speech, no lateralizing weakness.   PSYCH Unable to assess     Medications:   Medications:    piperacillin-tazobactam  3,375 mg IntraVENous Q12H    vancomycin  1,000 mg IntraVENous Q24H    cefTRIAXone (ROCEPHIN) IV  1,000 mg IntraVENous Q24H    carbidopa-levodopa  1 tablet Oral TID    latanoprost  1 drop Both Eyes Nightly    linaclotide  290 mcg Oral QAM AC    QUEtiapine  25 mg Oral Nightly    rivastigmine  1 patch TransDERmal Daily    trospium  20 mg Oral BID AC    sodium chloride  1,000 mL IntraVENous Once    sodium chloride flush  5-40 mL IntraVENous 2 times per day    enoxaparin  40 mg SubCUTAneous Daily      Infusions:    sodium chloride       PRN Meds: hydrALAZINE, 10 mg, Q6H PRN  sodium chloride flush, 10 mL, PRN  sodium chloride, 25 mL, PRN  polyethylene glycol, 17 g, Daily PRN  nicotine polacrilex, 2 mg, Q1H PRN  acetaminophen, 650 mg, Q6H PRN   Or  acetaminophen, 650 mg, Q6H PRN  ondansetron, 4 mg, Q6H PRN          Patient is still admitted because continuing work up for encephalopathy . The anticipated discharge is in greater than 24 hours.      Electronically signed by COURTNEY Nick CNP on 12/27/2021 at 10:09 AM

## 2021-12-27 NOTE — CARE COORDINATION
LSW called [facility] and spoke with Magda Dub[facility]. LSw informed Magda Dubois that pt is at her baseline and PT/OT are not recommending SNF. Pt is LTC at [facility]. Magda Dubois stated they are checking with their cooperate to make sure they do not need a precert for tp to return. LSW is confused with this since LSW was told pt is paying privately. Magda Dubois stated she is waiting to talk with Maria Morales and will call this LSW back.

## 2021-12-27 NOTE — PROGRESS NOTES
Speech Language Pathology  Facility/Department: [facility] EVALUATION    NAME: Karolina Powell  : 1935  MRN: [id number]    ADMISSION DATE: 2021  ADMITTING DIAGNOSIS: has Essential hypertension; Mixed hyperlipidemia; Glaucoma of both eyes; Major depressive disorder with single episode; Weight loss; Thrombocytopenia ([address] Bilateral low back pain with left-sided sciatica; Myelodysplastic syndrome ([address] Renal insufficiency; Hallucination; Parkinson disease ([address] Other megaloblastic anemias, not elsewhere classified; Other fatigue; Dementia with behavioral disturbance ([address] Other constipation; 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[address] Urinary incontinence; Encephalopathy; Generalized weakness; and Acute COVID-19 on their problem list.      Impressions: Karolina Powell was seen for a bedside swallowing evaluation after being admitted to [facility] with AMS and COVID-19. Pt was alert, cooperative and pleasantly confused throughout assessment. Relevant medical hx includes hypertension, depression, Parkinson's disease and dementia. Pt denies hx of dysphagia PTA. Pt was positioned upright in bed and presented with a clear vocal quality and strong volitional cough. Oral mechanism examination was [facility] with no focal orofacial weakness. PO trials of puree, regular solids and thin liquids by cup/straw sips were given. Pt demonstrated prolonged mastication with adequate oral clearance with trials of regular solids. Pharyngeal swallow appeared delayed with adequate laryngeal elevation. Clear vocal quality and 0 overt s/s of aspiration were observed with all PO trials given. Recommend continue soft and bite sized solids/thin liquids with aspiration precautions. SLP will continue to follow Karolina Powell for diet tolerance monitoring x1-2.        ONSET DATE: this admission     Date of Eval: 2021  Evaluating Therapist: BC Murillo    Current Diet level:  Current Diet : Dysphagia Soft and Bite-Sized (Dysphagia III)  Current Liquid Diet : Thin      Primary Complaint  Patient Complaint: weakness    Pain:  Pain Assessment  Pain Assessment: 0-10  Pain Level: 0  Response to Pain Intervention: Asleep with RR greater than 10  Pain Assessment/FLACC  Pain Rating: FLACC (rest) - Face: no particular expression or smile  Pain Rating: FLACC (rest) - Legs: normal position or relaxed  Pain Rating: FLACC (rest) - Activity: lying quietly, normal position, moves easily  Pain Rating: FLACC (rest) - Cry: no cry (awake or asleep)  Pain Rating: FLACC (rest) - Consolability: content, relaxed  Score: FLACC (rest): 0    Reason for Referral  Jessica Kuhn was referred for a bedside swallow evaluation to assess the efficiency of her swallow function, identify signs and symptoms of aspiration and make recommendations regarding safe dietary consistencies, effective compensatory strategies, and safe eating environment. Impression  Dysphagia Diagnosis: Mild oral stage dysphagia;Mild pharyngeal stage dysphagia  Dysphagia Outcome Severity Scale: Level 5: Mild dysphagia- Distant supervision. May need one diet consistency restricted     Treatment Plan  Requires SLP Intervention: Yes  Duration/Frequency of Treatment: 1-2xs weekly/ LOS or until goals are met  D/C Recommendations: To be determined       Recommended Diet and Intervention  Diet Solids Recommendation: Dysphagia Soft and Bite-Sized (Dysphagia III)  Liquid Consistency Recommendation:  Thin  Recommended Form of Meds: PO     Therapeutic Interventions: Diet tolerance monitoring    Compensatory Swallowing Strategies  Compensatory Swallowing Strategies: Eat/Feed slowly;Upright as possible for all oral intake    Treatment/Goals  Short-term Goals  Timeframe for Short-term Goals: LOS or until goals are met  Goal 1: Pt will tolerate soft and bite sized solids/thin liquids without clinical evidence of aspiration 100%  Goal 2: Pt/caregivers will demonstrate comprehension of recommendations/POC    General  Chart Reviewed: Yes  Behavior/Cognition: Alert; Cooperative;Pleasant mood;Confused  Respiratory Status: Room air  O2 Device: None (Room air)  Communication Observation: Functional  Follows Directions: Simple  Dentition: Adequate  Patient Positioning: Upright in bed  Baseline Vocal Quality: Normal  Volitional Cough: Strong  Prior Dysphagia History: Pt denies  Consistencies Administered: Reg solid; Dysphagia Pureed (Dysphagia I); Thin - cup; Thin - teaspoon; Thin - straw           Vision/Hearing  Vision  Vision: Within Functional Limits  Hearing  Hearing: Within functional limits    Oral Motor Deficits  Oral/Motor  Oral Motor: Within functional limits    Oral Phase Dysfunction  Oral Phase  Oral Phase: Exceptions     Indicators of Pharyngeal Phase Dysfunction   Pharyngeal Phase  Pharyngeal Phase: Exceptions    Prognosis  Prognosis  Prognosis for safe diet advancement: good  Individuals consulted  Consulted and agree with results and recommendations: Patient    Education  Patient Education: recommendations/POC  Patient Education Response: Verbalizes understanding  Safety Devices in place: Yes  Type of devices:  All fall risk precautions in place       Therapy Time  SLP Individual Minutes  Time In: 1030  Time Out: 1100  Minutes: Ruddy Ledbetter 89, CCC-SLP, 12/27/2021

## 2021-12-27 NOTE — PROGRESS NOTES
Neurology Service Progress Note  [facility]  Patient Name: Danya Carpio  : 1935        Subjective:   Reason for consult: AMS   Patient seen and examined. Chart reviewed in detail. Patient recently received her morning medications and took them without difficulties, and is receiving assistance with breakfast. Appears more alert compared to yesterday's exam. Patient is mildly rigid which appears to be an improvement from yesterday as patient did take her medication this morning. Past Medical History:   Diagnosis Date    Colonoscopy refused     Glaucoma     Dr. Fredrick Hsu Hyperlipidemia     Hypertension     Major depressive disorder with single episode 2018    Patient has depression and agitation. Was started on the Lexapro.  Myelodysplastic syndrome ([address] 10/22/2018    Dx 10/2018 : Myelodysplastic syndrome with multilineage dysplasia Dr Tere Santos    Obesity     Status post total left knee replacement 2017    Pt had  Left TKA on 3/2017 in [place] Dr Henny Yanez. Antibiotic prophylaxis recommended by ortho when needed.     Thrombocytopenia ([address] 2018    Patient is referred to hematologist    :   Past Surgical History:   Procedure Laterality Date    CATARACT REMOVAL Bilateral     CHOLECYSTECTOMY      HYSTERECTOMY Bilateral     KNEE ARTHROSCOPY Left     TOTAL KNEE ARTHROPLASTY Left 03/10/2017     Medications:  Scheduled Meds:   piperacillin-tazobactam  3,375 mg IntraVENous Q12H    vancomycin  1,000 mg IntraVENous Q24H    cefTRIAXone (ROCEPHIN) IV  1,000 mg IntraVENous Q24H    carbidopa-levodopa  1 tablet Oral TID    latanoprost  1 drop Both Eyes Nightly    linaclotide  290 mcg Oral QAM AC    QUEtiapine  25 mg Oral Nightly    rivastigmine  1 patch TransDERmal Daily    trospium  20 mg Oral BID AC    sodium chloride  1,000 mL IntraVENous Once    sodium chloride flush  5-40 mL IntraVENous 2 times per day    enoxaparin  40 mg SubCUTAneous Daily     Continuous Infusions:   sodium chloride       PRN Meds:.hydrALAZINE, sodium chloride flush, sodium chloride, polyethylene glycol, nicotine polacrilex, acetaminophen **OR** acetaminophen, ondansetron    No Known Allergies  Social History     Socioeconomic History    Marital status:      Spouse name: Not on file    Number of children: Not on file    Years of education: Not on file    Highest education level: Not on file   Occupational History    Not on file   Tobacco Use    Smoking status: Never Smoker    Smokeless tobacco: Never Used   Vaping Use    Vaping Use: Never used   Substance and Sexual Activity    Alcohol use: No     Alcohol/week: 0.0 standard drinks    Drug use: No    Sexual activity: Yes     Partners: Male   Other Topics Concern    Not on file   Social History Narrative    Not on file     Social Determinants of Health     Financial Resource Strain: Medium Risk    Difficulty of Paying Living Expenses: Somewhat hard   Food Insecurity: No Food Insecurity    Worried About Running Out of Food in the Last Year: Never true    Liya of Food in the Last Year: Never true   Transportation Needs: No Transportation Needs    Lack of Transportation (Medical): No    Lack of Transportation (Non-Medical):  No   Physical Activity:     Days of Exercise per Week: Not on file    Minutes of Exercise per Session: Not on file   Stress:     Feeling of Stress : Not on file   Social Connections:     Frequency of Communication with Friends and Family: Not on file    Frequency of Social Gatherings with Friends and Family: Not on file    Attends Restorationist Services: Not on file    Active Member of Clubs or Organizations: Not on file    Attends Club or Organization Meetings: Not on file    Marital Status: Not on file   Intimate Partner Violence:     Fear of Current or Ex-Partner: Not on file    Emotionally Abused: Not on file    Physically Abused: Not on file    Sexually Abused: Not on file   Housing Stability:     Unable to Pay for Housing in the Last Year: Not on file    Number of Places Lived in the Last Year: Not on file    Unstable Housing in the Last Year: Not on file      History reviewed. No pertinent family history. Physical Exam:       Vitals:    12/26/21 1604 12/26/21 1915 12/27/21 0239 12/27/21 1030   BP: (!) 176/98 (!) 164/62 (!) 144/81 137/81   Pulse: 78 77 77 73   Resp: 18 20 17 18   Temp: 98 °F (36.7 °C) 97.8 °F (36.6 °C) 99.2 °F (37.3 °C) 99.1 °F (37.3 °C)   TempSrc: Oral Oral Oral Oral   SpO2: 97% 93% 94% 95%   Weight:           Wt Readings from Last 3 Encounters:   12/26/21 150 lb 12.7 oz (68.4 kg)   12/23/21 144 lb (65.3 kg)   11/22/21 144 lb 9.6 oz (65.6 kg)     Temp Readings from Last 3 Encounters:   12/27/21 99.1 °F (37.3 °C) (Oral)   12/23/21 98.4 °F (36.9 °C) (Oral)   11/22/21 97.4 °F (36.3 °C) (Temporal)     BP Readings from Last 3 Encounters:   12/27/21 137/81   12/23/21 (!) 168/82   11/22/21 (!) 89/57     Pulse Readings from Last 3 Encounters:   12/27/21 73   12/23/21 78   11/22/21 68        Gen: awake but altered, encephalopathic ,alert to self  HEENT: NC/AT, EOMI, PERRL, mmm, , neck supple, no meningeal signs;   Heart: regular   Lungs: no distress  Ext: no edema, no calf tenderness b/l  Psych: flat, but responsive  Skin: no rashes or lesions    NEUROLOGIC EXAM:    Mental Status: A&O to self, NAD, speech clear, language fluent, follows commands appropriately    Cranial Nerve Exam:   CN II-XII: grossly intact with pupils equal and reactive, face symmetrical, tongue midline, no gaze deviation     Motor Exam:       Antigravity x4 to pain, L/R UE mildly rigid, BLE 4/5 weakness with bilateral foot drop     Deep Tendon Reflexes: 1/4 biceps, triceps, brachioradialis, patellar, and achilles b/l; flexor plantar responses b/l    Sensation: Intact light touch  UE's/LE's b/l    Coordination/Cerebellum:       Tremors--none      Gait and stance:      Gait: deferred      LABS:     Recent Labs     12/24/21  1240 12/24/21  1240 12/25/21  0431 12/26/21  0640 12/27/21  0556   WBC 3.3*  --  3.0*  --   --       < > 135 141 140   K 4.0   < > 3.8 3.9 3.8      < > 104 109 107   CO2 22   < > 24 24 24   BUN 25*   < > 21 18 15   CREATININE 1.0   < > 0.9 1.1 1.0   GLUCOSE 102*   < > 72 79 73    < > = values in this interval not displayed. IMAGING:      CT head   Impression   No acute intracranial abnormality.       Mild parenchymal volume loss.       Mild chronic microvascular disease       CTA head and neck   Impression   Mild atherosclerotic disease in the region of the left internal carotid   artery bulb with approximately 20% stenosis of the left internal carotid   artery.  No other significant abnormality of the neck vessels.       No significant abnormality of the intracranial circulation. ASSESSMENT/PLAN:     3 82year old female with acute AMS-secondary to metabolic encephalopathy with acute COVID-19 virus superimposed on hx of Lewy Body Disease. --Patient's mentation shows mild improvement today on exam  -Patient is mildly rigid on exam to upper and lower extremities, and has received her home Sinemet medication, as patient is taking p.o. medication and ingesting food at this time.    -  Neuro exam shows mild rigidity, disorientation, but non focal, however patient is conversing with staff at intervals and is following commands. --Recommend PT/OT their recommendations  [facility]  --CT head is non acute. No further studies recommended at this time, suspect when she gets her home medications and has her acute viral illness improves her mentation will improve. Nothing further from our standpoint. We will sign off    Thank you for allowing us to participate in the care of your patient. If there are any questions regarding evaluation please feel free to contact us.      Netat Archibald, COURTNEY - CNP, 12/27/2021

## 2021-12-28 LAB
ANION GAP SERPL CALCULATED.3IONS-SCNC: 7 MMOL/L (ref 4–16)
BUN BLDV-MCNC: 12 MG/DL (ref 6–23)
CALCIUM SERPL-MCNC: 7.9 MG/DL (ref 8.3–10.6)
CHLORIDE BLD-SCNC: 104 MMOL/L (ref 99–110)
CO2: 23 MMOL/L (ref 21–32)
CREAT SERPL-MCNC: 0.9 MG/DL (ref 0.6–1.1)
CULTURE: ABNORMAL
DOSE AMOUNT: NORMAL
DOSE TIME: NORMAL
GFR AFRICAN AMERICAN: >60 ML/MIN/1.73M2
GFR NON-AFRICAN AMERICAN: 60 ML/MIN/1.73M2
GLUCOSE BLD-MCNC: 87 MG/DL (ref 70–99)
Lab: ABNORMAL
Lab: ABNORMAL
POTASSIUM SERPL-SCNC: 3.8 MMOL/L (ref 3.5–5.1)
SODIUM BLD-SCNC: 134 MMOL/L (ref 135–145)
SPECIMEN: ABNORMAL
SPECIMEN: ABNORMAL
VANCOMYCIN RANDOM: 13.1 UG/ML

## 2021-12-28 PROCEDURE — G0378 HOSPITAL OBSERVATION PER HR: HCPCS

## 2021-12-28 PROCEDURE — 6360000002 HC RX W HCPCS: Performed by: STUDENT IN AN ORGANIZED HEALTH CARE EDUCATION/TRAINING PROGRAM

## 2021-12-28 PROCEDURE — 6360000002 HC RX W HCPCS: Performed by: NURSE PRACTITIONER

## 2021-12-28 PROCEDURE — 80202 ASSAY OF VANCOMYCIN: CPT

## 2021-12-28 PROCEDURE — 2580000003 HC RX 258: Performed by: NURSE PRACTITIONER

## 2021-12-28 PROCEDURE — 6360000002 HC RX W HCPCS: Performed by: FAMILY MEDICINE

## 2021-12-28 PROCEDURE — 2580000003 HC RX 258: Performed by: FAMILY MEDICINE

## 2021-12-28 PROCEDURE — 96375 TX/PRO/DX INJ NEW DRUG ADDON: CPT

## 2021-12-28 PROCEDURE — 99211 OFF/OP EST MAY X REQ PHY/QHP: CPT

## 2021-12-28 PROCEDURE — 36415 COLL VENOUS BLD VENIPUNCTURE: CPT

## 2021-12-28 PROCEDURE — 96376 TX/PRO/DX INJ SAME DRUG ADON: CPT

## 2021-12-28 PROCEDURE — 6370000000 HC RX 637 (ALT 250 FOR IP): Performed by: STUDENT IN AN ORGANIZED HEALTH CARE EDUCATION/TRAINING PROGRAM

## 2021-12-28 PROCEDURE — 6370000000 HC RX 637 (ALT 250 FOR IP): Performed by: FAMILY MEDICINE

## 2021-12-28 PROCEDURE — 80048 BASIC METABOLIC PNL TOTAL CA: CPT

## 2021-12-28 RX ORDER — AMLODIPINE BESYLATE 5 MG/1
5 TABLET ORAL DAILY
Status: DISCONTINUED | OUTPATIENT
Start: 2021-12-28 | End: 2021-12-29 | Stop reason: HOSPADM

## 2021-12-28 RX ORDER — FUROSEMIDE 20 MG/1
20 TABLET ORAL DAILY
Status: DISCONTINUED | OUTPATIENT
Start: 2021-12-28 | End: 2021-12-29 | Stop reason: HOSPADM

## 2021-12-28 RX ORDER — HYDRALAZINE HYDROCHLORIDE 20 MG/ML
20 INJECTION INTRAMUSCULAR; INTRAVENOUS EVERY 6 HOURS PRN
Status: DISCONTINUED | OUTPATIENT
Start: 2021-12-28 | End: 2021-12-29 | Stop reason: HOSPADM

## 2021-12-28 RX ORDER — AMLODIPINE BESYLATE 5 MG/1
5 TABLET ORAL ONCE
Status: COMPLETED | OUTPATIENT
Start: 2021-12-28 | End: 2021-12-28

## 2021-12-28 RX ORDER — CEFDINIR 300 MG/1
300 CAPSULE ORAL 2 TIMES DAILY
Qty: 10 CAPSULE | Refills: 0 | OUTPATIENT
Start: 2021-12-28 | End: 2022-01-02

## 2021-12-28 RX ORDER — AMLODIPINE BESYLATE 5 MG/1
5 TABLET ORAL
Status: DISCONTINUED | OUTPATIENT
Start: 2021-12-28 | End: 2021-12-28

## 2021-12-28 RX ORDER — AMLODIPINE BESYLATE 5 MG/1
5 TABLET ORAL DAILY
Qty: 30 TABLET | Refills: 0 | OUTPATIENT
Start: 2021-12-28

## 2021-12-28 RX ADMIN — CARBIDOPA AND LEVODOPA 1 TABLET: 25; 100 TABLET ORAL at 14:52

## 2021-12-28 RX ADMIN — SODIUM CHLORIDE, PRESERVATIVE FREE 10 ML: 5 INJECTION INTRAVENOUS at 10:41

## 2021-12-28 RX ADMIN — LOSARTAN POTASSIUM 25 MG: 25 TABLET, FILM COATED ORAL at 18:06

## 2021-12-28 RX ADMIN — TROSPIUM CHLORIDE 20 MG: 20 TABLET, FILM COATED ORAL at 15:50

## 2021-12-28 RX ADMIN — HYDRALAZINE HYDROCHLORIDE 20 MG: 20 INJECTION INTRAMUSCULAR; INTRAVENOUS at 15:49

## 2021-12-28 RX ADMIN — AMLODIPINE BESYLATE 5 MG: 5 TABLET ORAL at 11:38

## 2021-12-28 RX ADMIN — SODIUM CHLORIDE, PRESERVATIVE FREE 10 ML: 5 INJECTION INTRAVENOUS at 20:47

## 2021-12-28 RX ADMIN — CARBIDOPA AND LEVODOPA 1 TABLET: 25; 100 TABLET ORAL at 20:47

## 2021-12-28 RX ADMIN — CEFTRIAXONE SODIUM 1000 MG: 1 INJECTION, POWDER, FOR SOLUTION INTRAMUSCULAR; INTRAVENOUS at 14:52

## 2021-12-28 RX ADMIN — LATANOPROST 1 DROP: 50 SOLUTION OPHTHALMIC at 23:03

## 2021-12-28 RX ADMIN — QUETIAPINE FUMARATE 25 MG: 25 TABLET ORAL at 20:47

## 2021-12-28 RX ADMIN — CARBIDOPA AND LEVODOPA 1 TABLET: 25; 100 TABLET ORAL at 10:39

## 2021-12-28 RX ADMIN — TROSPIUM CHLORIDE 20 MG: 20 TABLET, FILM COATED ORAL at 06:54

## 2021-12-28 RX ADMIN — AMLODIPINE BESYLATE 5 MG: 5 TABLET ORAL at 15:49

## 2021-12-28 RX ADMIN — FUROSEMIDE 20 MG: 20 TABLET ORAL at 11:38

## 2021-12-28 ASSESSMENT — PAIN SCALES - GENERAL
PAINLEVEL_OUTOF10: 0
PAINLEVEL_OUTOF10: 0

## 2021-12-28 NOTE — CONSULTS
[facility] Continence Nurse  Consult Note       Shaye Gil Danielle  AGE: 82 y.o. GENDER: female  : 1935  TODAY'S DATE:  2021    Subjective:     Reason for  Evaluation and Assessment: wound care kirstie Carpio is a 82 y.o. female referred by:   [x] Physician  [] Nursing  [] Other:     Wound Identification:  Wound Type: pressure, traumatic and skin tear  Contributing Factors: chronic pressure and decreased mobility        PAST MEDICAL HISTORY        Diagnosis Date    Colonoscopy refused     Glaucoma     Dr. Zafar Martinez    Hyperlipidemia     Hypertension     Major depressive disorder with single episode 2018    Patient has depression and agitation. Was started on the Lexapro.  Myelodysplastic syndrome ([facility] [address] 10/22/2018    Dx 10/2018 : Myelodysplastic syndrome with multilineage dysplasia Dr Tere Santos    Obesity     Status post total left knee replacement 2017    Pt had  Left TKA on 3/2017 in [place] Dr Henny Yanez. Antibiotic prophylaxis recommended by ortho when needed.  Thrombocytopenia ([address] 2018    Patient is referred to hematologist       PAST SURGICAL HISTORY    Past Surgical History:   Procedure Laterality Date    CATARACT REMOVAL Bilateral     CHOLECYSTECTOMY      HYSTERECTOMY Bilateral     KNEE ARTHROSCOPY Left     TOTAL KNEE ARTHROPLASTY Left 03/10/2017       FAMILY HISTORY    History reviewed. No pertinent family history. SOCIAL HISTORY    Social History     Tobacco Use    Smoking status: Never Smoker    Smokeless tobacco: Never Used   Vaping Use    Vaping Use: Never used   Substance Use Topics    Alcohol use: No     Alcohol/week: 0.0 standard drinks    Drug use: No       ALLERGIES    No Known Allergies    MEDICATIONS    No current facility-administered medications on file prior to encounter.      Current Outpatient Medications on File Prior to Encounter   Medication Sig Dispense Refill    acetaminophen (TYLENOL) 500 MG tablet Take 1,000 mg by mouth 3 times daily      cephALEXin (KEFLEX) 500 MG capsule Take 500 mg by mouth 2 times daily      furosemide (LASIX) 20 MG tablet Take 20 mg by mouth daily      methocarbamol (ROBAXIN) 750 MG tablet Take 750 mg by mouth 2 times daily      rivastigmine (EXELON) 4.6 MG/24HR Place 1 patch onto the skin daily 30 patch 3    QUEtiapine (SEROQUEL) 25 MG tablet Take 25 mg by mouth nightly       aspirin 81 MG chewable tablet Take 1 tablet by mouth daily 30 tablet 3    LINZESS 290 MCG CAPS capsule TAKE 1 CAPSULE BY MOUTH EVERY DAY      carbidopa-levodopa (SINEMET)  MG per tablet TAKE 1 (ONE) TABLET BY MOUTH 3 (THREE) TIMES A DAY .       tolterodine (DETROL LA) 2 MG extended release capsule Take 2 mg by mouth daily Dr. Crystal Prieto       latanoprost (XALATAN) 0.005 % ophthalmic solution Place 1 drop into both eyes nightly            Objective:      BP (!) 179/81   Pulse 65   Temp 97.8 °F (36.6 °C) (Oral)   Resp 17   Wt 150 lb 12.7 oz (68.4 kg)   SpO2 96%   BMI 23.62 kg/m²   Linus Risk Score: Linus Scale Score: 16    LABS    CBC:   Lab Results   Component Value Date    WBC 3.0 12/25/2021    RBC 2.69 12/25/2021    HGB 8.7 12/25/2021    HCT 28.4 12/25/2021    .6 12/25/2021    MCH 32.3 12/25/2021    MCHC 30.6 12/25/2021    RDW 16.8 12/25/2021     12/25/2021    MPV 12.5 12/24/2021     CMP:    Lab Results   Component Value Date     12/28/2021    K 3.8 12/28/2021     12/28/2021    CO2 23 12/28/2021    BUN 12 12/28/2021    CREATININE 0.9 12/28/2021    GFRAA >60 12/28/2021    AGRATIO 1.6 06/22/2020    LABGLOM 60 12/28/2021    GLUCOSE 87 12/28/2021    PROT 5.1 12/24/2021    PROT 6.6 10/03/2012    LABALBU 2.5 12/24/2021    CALCIUM 7.9 12/28/2021    BILITOT 0.2 12/24/2021    ALKPHOS 69 12/24/2021    AST 20 12/24/2021    ALT <5 12/24/2021     Albumin:    Lab Results   Component Value Date    LABALBU 2.5 12/24/2021     PT/INR:    Lab Results   Component Value Date PROTIME 11.2 10/20/2021    INR 0.90 10/20/2021     HgBA1c:  No results found for: LABA1C      Assessment:     Patient Active Problem List   Diagnosis    Essential hypertension    Mixed hyperlipidemia    Glaucoma of both eyes    Major depressive disorder with single episode    Weight loss    Thrombocytopenia (HCC)    Bilateral low back pain with left-sided sciatica    Myelodysplastic syndrome (HCC)    Renal insufficiency    Hallucination    Parkinson disease ([address]    Other megaloblastic anemias, not elsewhere classified    Other fatigue    Dementia with behavioral disturbance (HCC)    Other constipation    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[address]    Urinary incontinence    Encephalopathy    Generalized weakness    Acute COVID-19       Measurements:  Wound 12/25/21 Pretibial Left (Active)   Wound Image   12/28/21 1300   Wound Etiology Other 12/28/21 1300   Dressing Status New dressing applied 12/28/21 1300   Wound Cleansed Cleansed with saline 12/28/21 1300   Dressing/Treatment Collagen;Silicone border 96/53/84 1300   Wound Length (cm) 8 cm 12/28/21 1300   Wound Width (cm) 5 cm 12/28/21 1300   Wound Depth (cm) 0.1 cm 12/28/21 1300   Wound Surface Area (cm^2) 40 cm^2 12/28/21 1300   Wound Volume (cm^3) 4 cm^3 12/28/21 1300   Distance Tunneling (cm) 0 cm 12/28/21 1300   Tunneling Position ___ O'Clock 0 12/28/21 1300   Undermining Starts ___ O'Clock 0 12/28/21 1300   Undermining Ends___ O'Clock 0 12/28/21 1300   Undermining Maxium Distance (cm) 0 12/28/21 1300   Drainage Amount Moderate 12/28/21 1300   Drainage Description Serosanguinous 12/28/21 1300   Odor None 12/28/21 1300   Joanie-wound Assessment Intact 12/28/21 1300   Margins Defined edges 12/28/21 1300   Wound Thickness Description not for Pressure Injury Partial thickness 12/28/21 1300   Number of days: 3       Wound 12/28/21 Buttocks Right (Active)   Wound Image   12/28/21 1300   Wound Etiology Pressure Stage  2 12/28/21 1300 Dressing Status New dressing applied 12/28/21 1300   Wound Cleansed Cleansed with saline 12/28/21 1300   Dressing/Treatment Collagen;Silicone border 51/25/83 1300   Wound Length (cm) 0.7 cm 12/28/21 1300   Wound Width (cm) 0.5 cm 12/28/21 1300   Wound Depth (cm) 0.1 cm 12/28/21 1300   Wound Surface Area (cm^2) 0.35 cm^2 12/28/21 1300   Wound Volume (cm^3) 0.035 cm^3 12/28/21 1300   Distance Tunneling (cm) 0 cm 12/28/21 1300   Tunneling Position ___ O'Clock 0 12/28/21 1300   Undermining Starts ___ O'Clock 0 12/28/21 1300   Undermining Ends___ O'Clock 0 12/28/21 1300   Undermining Maxium Distance (cm) 0 12/28/21 1300   Wound Assessment Pink/red 12/28/21 1300   Drainage Amount Small 12/28/21 1300   Drainage Description Serosanguinous 12/28/21 1300   Odor None 12/28/21 1300   Joanie-wound Assessment Intact 12/28/21 1300   Margins Defined edges 12/28/21 1300   Wound Thickness Description not for Pressure Injury Partial thickness 12/28/21 1300   Number of days: 0       Wound 12/28/21 Elbow Right;Posterior (Active)   Wound Image   12/28/21 1300   Wound Etiology Skin Tear 12/28/21 1300   Dressing Status New dressing applied 12/28/21 1300   Wound Cleansed Cleansed with saline 12/28/21 1300   Dressing/Treatment Collagen;Silicone border 08/60/35 1300   Wound Length (cm) 2 cm 12/28/21 1300   Wound Width (cm) 2 cm 12/28/21 1300   Wound Depth (cm) 0.1 cm 12/28/21 1300   Wound Surface Area (cm^2) 4 cm^2 12/28/21 1300   Wound Volume (cm^3) 0.4 cm^3 12/28/21 1300   Distance Tunneling (cm) 0 cm 12/28/21 1300   Tunneling Position ___ O'Clock 0 12/28/21 1300   Undermining Starts ___ O'Clock 0 12/28/21 1300   Undermining Ends___ O'Clock 0 12/28/21 1300   Undermining Maxium Distance (cm) 0 12/28/21 1300   Drainage Amount Moderate 12/28/21 1300   Drainage Description Serosanguinous; Yellow 12/28/21 1300   Odor None 12/28/21 1300   Joanie-wound Assessment Intact 12/28/21 1300   Margins Defined edges 12/28/21 1300   Wound Thickness Description not for Pressure Injury Partial thickness 12/28/21 1300   Number of days: 0       Response to treatment:  Well tolerated by patient. Pain Assessment:  Severity:  none  Quality of pain:   Wound Pain Timing/Severity:   Premedicated: no    Plan:     Plan of Care: Wound 12/28/21 Buttocks Right-Dressing/Treatment: Collagen,Silicone border  Wound 12/28/21 Elbow Right;Posterior-Dressing/Treatment: Collagen,Silicone border  Wound 12/25/21 Pretibial Left-Dressing/Treatment: Collagen,Silicone border     Patient seen in room 3001 B. Pt appears confused is agreeable to wound care eval. Pt has a wound to left anterior leg appears possibly from a fall. Cleansed with NS. Measured and pictured. Applied a dressing as above. Pt has a skin tear to rt elbow. Cleansed with NS measured and pictured. Dressing as above. Rt buttocks wound pressure stage 2. Cleansed with NS. Measured and pictured. Applied dressing as above. Pt turned to rt side. Heels intact and floated. Atmos air pump placed to bed. Pt is at mild risk for skin breakdown AEB tobi. Follow tobi orders. Specialty Bed Required :  yes  [] Low Air Loss   [x] Pressure Redistribution  [] Fluid Immersion  [] Bariatric  [] Total Pressure Relief  [] Other:     Discharge Plan:  Placement for patient upon discharge: snf  Hospice Care: no  Patient appropriate for Outpatient [address]: [facility]    Patient/Caregiver Teaching:  Level of patient/caregiver understanding able to:   Cares explained as given. No evidence of learning. Electronically signed by Aury Haas RN,  on 12/28/2021 at 4:39 PM

## 2021-12-28 NOTE — PROGRESS NOTES
Brook Teague from [facility] called to say they will not be able to take patient back tonight because they have to move beds around to accommodate her. She said if she is medically stable in the AM they will have a bed ready for her.

## 2021-12-28 NOTE — PROGRESS NOTES
[address] [place] OF SPEECH/LANGUAGE PATHOLOGY    Tucker Magana Danielle  12/28/2021  [id number]    Attempted to see Shantelle Brenner for dysphagia follow-up for diet tolerance assessment/possible advancement. Pt did not follow directions to participate and did not accept offers of lunch or PO trials. Will reattempt as able.     Dudley Martínez MA CCC-SLP  12/28/2021  2:15 PM

## 2021-12-28 NOTE — CARE COORDINATION
POLYW received a call from Chavez Portillo with [facility]. Due to pt being COVID + and they have to rearrange room at their facility the NH can not take pt until tomorrow. POLYW explained to Chavez Portillo that we have pts in the ED waiting for rooms. Kori Osorio and stated that they do not have a room arranged for pt at this time however they will take pt in the morning.

## 2021-12-28 NOTE — DISCHARGE SUMMARY
Discharge Summary    Name:  Kaylyn Bright /Age/Sex: 1935  (82 y.o. female)   MRN & CSN:  [id number] & [id number] Admission Date/Time: 2021 12:24 PM   Attending:  Lester Palmer MD Discharging Physician: Lester Palmer MD     Hospital Course:     Kaylyn Bright is a 82 y.o. female who presented with acute mental status changes. 1. Metabolic encephalopathy, improved  2. Hx of parkinson's and dementia   1. Seems to be back to her baseline  2. CT head () no acute intracranial abnormality   3. CXR () no acute injury   4. CTA head neck w/wo () no acute intracranial abnormality. 5. Blood CX 2 of 4 positive for staph spp (contamination); Repeat blood cx negative  6. UA () is positive and urine culture growing E. coli sensitive to penicillin; received IV ceftriaxone  7. Discharge on cefdinir 300 mg p.o. twice daily for 5 days  8. Seen by speech therapist  9. Seen by neurology; rec to continue pt's home medications     3.    covid 19   1. Ct chest  shows small to moderate pleural effusions  2. Rapid test positive on   3. Asymptomatic; on room air     4.    Essential hypertension          1. Uncontrolled   2. Not on any home medications          3. Started on amlodipine   4. Monitor BP and adjust medications      The patient expressed appropriate understanding of and agreement with the discharge recommendations, medications, and plan.      Consults this admission:  IP CONSULT TO PHARMACY  PHARMACY TO CHANGE BASE FLUIDS  IP CONSULT TO HOSPITALIST  IP CONSULT TO NEUROLOGY  IP CONSULT TO CASE MANAGEMENT  PHARMACY TO DOSE VANCOMYCIN    Discharge Instruction:   Follow up appointments: Neurology  Primary care physician: within 1 week    Diet:  regular diet and Soft and bite sized solids with thin liquids   Activity: activity as tolerated  Disposition: Discharged to:   []Home, []HHC, [x]SNF, []Acute Rehab, []Hospice   Condition on discharge: Stable    Discharge Medications: Medication List      ASK your doctor about these medications    acetaminophen 500 MG tablet  Commonly known as: TYLENOL     aspirin 81 MG chewable tablet  Take 1 tablet by mouth daily     carbidopa-levodopa  MG per tablet  Commonly known as: SINEMET     cephALEXin 500 MG capsule  Commonly known as: KEFLEX     furosemide 20 MG tablet  Commonly known as: LASIX     latanoprost 0.005 % ophthalmic solution  Commonly known as: XALATAN     Linzess 290 MCG Caps capsule  Generic drug: linaclotide     methocarbamol 750 MG tablet  Commonly known as: ROBAXIN     rivastigmine 4.6 MG/24HR  Commonly known as: EXELON  Place 1 patch onto the skin daily     SEROquel 25 MG tablet  Generic drug: QUEtiapine     tolterodine 2 MG extended release capsule  Commonly known as: DETROL LA            Objective Findings at Discharge:   BP (!) 187/86   Pulse 78   Temp 98.8 °F (37.1 °C) (Oral)   Resp 16   Wt 150 lb 12.7 oz (68.4 kg)   SpO2 94%   BMI 23.62 kg/m²            PHYSICAL EXAM  GEN Awake female, sitting upright in bed in no apparent distress. Appears given age. EYES Pupils are equally round. No scleral erythema, discharge, or conjunctivitis. Bruised right cheek and temple  HENT Mucous membranes are moist.   NECK Supple, no apparent thyromegaly or masses. RESP Clear to auscultation, no wheezes, rales or rhonchi. Symmetric chest movement while on room air. CARDIO/VASC S1/S2 auscultated. Regular rate without appreciable murmurs, rubs, or gallops. No JVD or carotid bruits. Peripheral pulses equal bilaterally and palpable. No peripheral edema. GI Abdomen is soft without significant tenderness, masses, or guarding. Bowel sounds are normoactive. Rectal exam deferred.  No costovertebral angle tenderness. Normal appearing external genitalia. Roper catheter is not present. MSK No gross joint deformities. SKIN Normal coloration, warm, dry.   NEURO Cranial nerves appear grossly intact, normal speech, no lateralizing weakness. PSYCH Awake, alert, oriented x 3. Affect appropriate.     BMP/CBC  Recent Labs     12/26/21  0640 12/27/21  0556 12/28/21  1042    140 134*   K 3.9 3.8 3.8    107 104   CO2 24 24 23   BUN 18 15 12   CREATININE 1.1 1.0 0.9       IMAGING:  As above    Discharge Time of 35 minutes    Electronically signed by Therese Owen MD on 12/28/2021 at 2:37 PM

## 2021-12-29 VITALS
WEIGHT: 154.54 LBS | HEART RATE: 66 BPM | BODY MASS INDEX: 24.2 KG/M2 | DIASTOLIC BLOOD PRESSURE: 59 MMHG | SYSTOLIC BLOOD PRESSURE: 128 MMHG | OXYGEN SATURATION: 95 % | RESPIRATION RATE: 15 BRPM | TEMPERATURE: 98.6 F

## 2021-12-29 PROCEDURE — G0378 HOSPITAL OBSERVATION PER HR: HCPCS

## 2021-12-29 PROCEDURE — 6370000000 HC RX 637 (ALT 250 FOR IP): Performed by: STUDENT IN AN ORGANIZED HEALTH CARE EDUCATION/TRAINING PROGRAM

## 2021-12-29 PROCEDURE — 2580000003 HC RX 258: Performed by: NURSE PRACTITIONER

## 2021-12-29 PROCEDURE — 6370000000 HC RX 637 (ALT 250 FOR IP): Performed by: FAMILY MEDICINE

## 2021-12-29 RX ORDER — CEFDINIR 300 MG/1
300 CAPSULE ORAL 2 TIMES DAILY
Qty: 10 CAPSULE | Refills: 0 | Status: SHIPPED | OUTPATIENT
Start: 2021-12-29 | End: 2022-01-03

## 2021-12-29 RX ORDER — LOSARTAN POTASSIUM 25 MG/1
25 TABLET ORAL EVERY EVENING
Qty: 30 TABLET | Refills: 3 | Status: SHIPPED | OUTPATIENT
Start: 2021-12-29

## 2021-12-29 RX ORDER — AMLODIPINE BESYLATE 5 MG/1
5 TABLET ORAL DAILY
Qty: 30 TABLET | Refills: 3 | Status: SHIPPED | OUTPATIENT
Start: 2021-12-30

## 2021-12-29 RX ADMIN — SODIUM CHLORIDE, PRESERVATIVE FREE 10 ML: 5 INJECTION INTRAVENOUS at 08:29

## 2021-12-29 RX ADMIN — AMLODIPINE BESYLATE 5 MG: 5 TABLET ORAL at 08:28

## 2021-12-29 RX ADMIN — TROSPIUM CHLORIDE 20 MG: 20 TABLET, FILM COATED ORAL at 08:28

## 2021-12-29 RX ADMIN — FUROSEMIDE 20 MG: 20 TABLET ORAL at 08:28

## 2021-12-29 RX ADMIN — CARBIDOPA AND LEVODOPA 1 TABLET: 25; 100 TABLET ORAL at 08:28

## 2021-12-29 ASSESSMENT — PAIN SCALES - GENERAL: PAINLEVEL_OUTOF10: 0

## 2021-12-29 NOTE — CARE COORDINATION
Pt is on discharge. Med Trans to  pt at 11:00. LSW faxed AVS with [facility]NA MARIA to NH.  RN, NH and  informed of  time.

## 2021-12-29 NOTE — PROGRESS NOTES
Hospitalist Progress Note      Name:  Jessica Kuhn /Age/Sex: 1935  (82 y.o. female)   MRN & CSN:  [id number] & [id number] Admission Date/Time: 2021 12:24 PM   Location:  300/3001-B PCP: Boston Zamora MD         Hospital Day: 6    Assessment and Plan:     Ludmila Santos is a 82 y. o. female who presented with acute mental status changes.     1. Metabolic encephalopathy, improved  2. Hx of parkinson's and dementia   1. Seems to be back to her baseline  2. CT head () no acute intracranial abnormality   3. CXR () no acute injury   4. CTA head neck w/wo () no acute intracranial abnormality. 5. Blood CX 2 of 4 positive for staph spp (contamination); Repeat blood cx negative  6. UA () is positive and urine culture growing E. coli sensitive to penicillin; received IV ceftriaxone  7. Discharge on cefdinir 300 mg p.o. twice daily for 5 days  8. Seen by speech therapist  9. Seen by neurology; rec to continue pt's home medications     3.    covid 19   1. Ct chest  shows small to moderate pleural effusions  2. Rapid test positive on   3. Asymptomatic; on room air      4.    Essential hypertension          1. Uncontrolled              2. Not on any home medications          3. Started on amlodipine              4.  Monitor BP and adjust medications      Diet ADULT DIET; Dysphagia - Soft and Bite Sized   DVT Prophylaxis [x] Lovenox, []  Heparin, [] SCDs, [] Ambulation   GI Prophylaxis [] PPI,  [] H2 Blocker,  [] Carafate,  [x] Diet/Tube Feeds   Code Status DNR-CCA   Disposition Patient requires continued admission due to confusion         Patient seen and examined at bedside. Comfortable. No active complaints. Wants to sleep.  Saturating on RA    Ten point ROS reviewed negative, unless as noted above    Objective:     No intake or output data in the 24 hours ending 21 0816   Vitals:   Vitals:    21 0240   BP: (!) 138/56   Pulse: 74   Resp: 16   Temp: 97.4 °F (36.3 °C)   SpO2:      Physical Exam:   GEN Awake female, sitting upright in bed in no apparent distress. Appears given age. EYES Pupils are equally round. No scleral erythema, discharge, or conjunctivitis. HENT Mucous membranes are moist. Oral pharynx without exudates, no evidence of thrush. NECK Supple, no apparent thyromegaly or masses. RESP Diminished to auscultation, no wheezes, rales or rhonchi. Symmetric chest movement while on room air. CARDIO/VASC S1/S2 auscultated. Regular rate without appreciable murmurs, rubs, or gallops. No JVD or carotid bruits. Peripheral pulses equal bilaterally and palpable. No peripheral edema. GI Abdomen is soft without significant tenderness, masses, or guarding. Bowel sounds are normoactive. Rectal exam deferred.  No costovertebral angle tenderness. Roper catheter is not present. HEME/LYMPH No palpable cervical lymphadenopathy and no hepatosplenomegaly. No petechiae or ecchymoses. MSK No gross joint deformities. SKIN Normal coloration, warm, dry. NEURO Cranial nerves appear grossly intact, normal speech, no lateralizing weakness.   PSYCH Unable to assess     Medications:   Medications:    furosemide  20 mg Oral Daily    amLODIPine  5 mg Oral Daily    losartan  25 mg Oral QPM    cefTRIAXone (ROCEPHIN) IV  1,000 mg IntraVENous Q24H    carbidopa-levodopa  1 tablet Oral TID    latanoprost  1 drop Both Eyes Nightly    linaclotide  290 mcg Oral QAM AC    QUEtiapine  25 mg Oral Nightly    rivastigmine  1 patch TransDERmal Daily    trospium  20 mg Oral BID AC    sodium chloride flush  5-40 mL IntraVENous 2 times per day    [Held by provider] enoxaparin  40 mg SubCUTAneous Daily      Infusions:    sodium chloride       PRN Meds: hydrALAZINE, 20 mg, Q6H PRN  sodium chloride flush, 10 mL, PRN  sodium chloride, 25 mL, PRN  polyethylene glycol, 17 g, Daily PRN  nicotine polacrilex, 2 mg, Q1H PRN  acetaminophen, 650 mg, Q6H PRN   Or  acetaminophen, 650 mg, Q6H PRN  ondansetron, 4 mg, Q6H PRN        Patient is still admitted because continuing work up for encephalopathy . The anticipated discharge is in less than 24 hours.      Electronically signed by Cathy Walden MD on 12/29/2021 at 8:16 AM

## 2021-12-29 NOTE — DISCHARGE SUMMARY
Discharge Summary           Name:  Ochoa Weston /Age/Sex: 1935  (82 y.o. female)   MRN & CSN:  [id number] & [id number] Admission Date/Time: 2021 12:24 PM   Attending:  Luc Samson MD Discharging Physician: Gloria Del Real [facility] Course:   Renea Santos is a 82 y. o. female who presented with acute mental status changes. She was hospitalized for further work-up and treatment. Please see below for hospital course. Metabolic encephalopathy: hx of parkinson's and dementia. CT head () no acute intracranial abnormality. CXR () no acute injury. CTA head neck w/wo () no acute intracranial abnormality. Blood CX 2 of 4 positive for CoNS; suspect contamination; repeat blood cx negative. Mentation back to baseline; will likely waxe and wane with underlying dementia/parkinsons. UTI: UA () is positive and urine culture growing E. coli sensitive to penicillin; received IV ceftriaxone while hospital;ixed. Suspect UTI contributing to AMS. Discharge on cefdinir 300 mg p.o. twice daily for 5 days    COVID-19: rapid test positive on . Chest CT  showed small to moderate pleural effusions. Asymptomatic; on room air. Can be monitored  at [facility]      Essential hypertension: new dx. Not on BP medications at [facility]. Started on amlodipine and Losartan.  BP can be monitored at [facility]; regimen may need to be adjusted                  Consults this admission:  Jeison Hamilton  IP CONSULT TO HOSPITALIST  IP CONSULT TO NEUROLOGY  IP CONSULT TO CASE MANAGEMENT  PHARMACY TO DOSE VANCOMYCIN    Discharge Instruction:   Follow up appointments:   Primary care physician:  within 2 weeks    Diet:  regular diet   Activity: activity as tolerated  Disposition: Discharged to:   []Home, []HHC, [x]SNF, []Acute Rehab, []Hospice   Condition on discharge: Stable    Discharge Medications:        Medication List      START taking these medications amLODIPine 5 MG tablet  Commonly known as: NORVASC  Take 1 tablet by mouth daily  Start taking on: December 30, 2021     losartan 25 MG tablet  Commonly known as: COZAAR  Take 1 tablet by mouth every evening        CONTINUE taking these medications    acetaminophen 500 MG tablet  Commonly known as: TYLENOL     aspirin 81 MG chewable tablet  Take 1 tablet by mouth daily     carbidopa-levodopa  MG per tablet  Commonly known as: SINEMET     furosemide 20 MG tablet  Commonly known as: LASIX     latanoprost 0.005 % ophthalmic solution  Commonly known as: XALATAN     Linzess 290 MCG Caps capsule  Generic drug: linaclotide     methocarbamol 750 MG tablet  Commonly known as: ROBAXIN     rivastigmine 4.6 MG/24HR  Commonly known as: EXELON  Place 1 patch onto the skin daily     SEROquel 25 MG tablet  Generic drug: QUEtiapine     tolterodine 2 MG extended release capsule  Commonly known as: DETROL LA        STOP taking these medications    cephALEXin 500 MG capsule  Commonly known as: Brooke Coombs           Where to Get Your Medications      You can get these medications from any pharmacy    Bring a paper prescription for each of these medications  · amLODIPine 5 MG tablet  · losartan 25 MG tablet         Objective Findings at Discharge:   BP (!) 128/59   Pulse 66   Temp 98.6 °F (37 °C) (Oral)   Resp 15   Wt 154 lb 8.7 oz (70.1 kg)   SpO2 95%   BMI 24.20 kg/m²            PHYSICAL EXAM     General: 82year old who appeared chronically ill  Eyes: eyelids/lashes normal appearence. Pupils equal, round and reactive. No scleral erythema, discharge, or conjunctivitis. HENT: bilateral ear and nose normal in appearance. Mucous membranes moist. Hearing grossly intact. Neck supple, no apparent thyromegaly or masses. Respiratory: lungs lear to auscultation, no wheezes, rales or rhonchi. Symmetric chest movement while on room air. Cardiovascular: RRR, S1/S2 auscultated. No murmurs, rubs, or gallops. No JVD or carotid bruits. Peripheral pulses equal bilaterally and palpable. No peripheral edema. GI: abdomen soft. No tenderness, masses, or guarding. Bowel sounds are normoactive. Rectal exam deferred. : No costovertebral angle tenderness. Roper catheter is not present. Heme/lymph: no palpable cervical lymphadenopathy and no hepatosplenomegaly. No petechiae or ecchymoses. Musculoskeletal: Full ROM of all 4 extremities, strength 5/5 in all extremities. No gross joint deformities.   Skin: ecchymosis to right eye/cheek bone   Neurological: difficult to assess as patient does not participate in exam, appears neurologically  intact   Psych: alert to self only      BMP/CBC  Recent Labs     12/27/21  0556 12/28/21  1042    134*   K 3.8 3.8    104   CO2 24 23   BUN 15 12   CREATININE 1.0 0.9       IMAGING:      Discharge Time of less than 30 minutes spent on discharge     Electronically signed by COURTNEY Wilson CNP on 12/29/2021 at 9:11 AM

## 2021-12-30 LAB
CULTURE: ABNORMAL
CULTURE: ABNORMAL
Lab: ABNORMAL
SPECIMEN: ABNORMAL

## 2021-12-31 LAB
CULTURE: NORMAL
Lab: NORMAL
SPECIMEN: NORMAL

## 2022-01-17 ENCOUNTER — TELEPHONE (OUTPATIENT)
Dept: INTERNAL MEDICINE CLINIC | Age: 87
End: 2022-01-17

## 2022-01-17 NOTE — TELEPHONE ENCOUNTER
Spoke with patient's son and he stated that Cecilia Mathis passed on 1- at [facility]